# Patient Record
Sex: MALE | Race: WHITE | NOT HISPANIC OR LATINO | Employment: FULL TIME | URBAN - METROPOLITAN AREA
[De-identification: names, ages, dates, MRNs, and addresses within clinical notes are randomized per-mention and may not be internally consistent; named-entity substitution may affect disease eponyms.]

---

## 2017-07-03 PROBLEM — I20.9 ANGINA, CLASS III: Status: ACTIVE | Noted: 2017-07-03

## 2017-07-03 PROBLEM — R07.9 CHEST PAIN SYNDROME: Status: ACTIVE | Noted: 2017-07-03

## 2018-05-31 ENCOUNTER — OFFICE VISIT (OUTPATIENT)
Dept: INTERNAL MEDICINE | Facility: CLINIC | Age: 38
End: 2018-05-31
Payer: COMMERCIAL

## 2018-05-31 VITALS
OXYGEN SATURATION: 97 % | HEART RATE: 66 BPM | DIASTOLIC BLOOD PRESSURE: 76 MMHG | SYSTOLIC BLOOD PRESSURE: 108 MMHG | WEIGHT: 268.63 LBS | HEIGHT: 74 IN | BODY MASS INDEX: 34.48 KG/M2

## 2018-05-31 DIAGNOSIS — I20.9 ANGINA, CLASS III: ICD-10-CM

## 2018-05-31 DIAGNOSIS — A60.00 GENITAL HERPES SIMPLEX, UNSPECIFIED SITE: ICD-10-CM

## 2018-05-31 DIAGNOSIS — Z13.220 SCREENING CHOLESTEROL LEVEL: ICD-10-CM

## 2018-05-31 DIAGNOSIS — Z13.29 SCREENING FOR THYROID DISORDER: ICD-10-CM

## 2018-05-31 DIAGNOSIS — Z01.89 ROUTINE LAB DRAW: ICD-10-CM

## 2018-05-31 DIAGNOSIS — Z00.00 ENCOUNTER FOR HEALTH MAINTENANCE EXAMINATION IN ADULT: Primary | ICD-10-CM

## 2018-05-31 PROCEDURE — 99999 PR PBB SHADOW E&M-NEW PATIENT-LVL IV: CPT | Mod: PBBFAC,,, | Performed by: NURSE PRACTITIONER

## 2018-05-31 PROCEDURE — 99385 PREV VISIT NEW AGE 18-39: CPT | Mod: S$GLB,,, | Performed by: NURSE PRACTITIONER

## 2018-05-31 RX ORDER — VALACYCLOVIR HYDROCHLORIDE 500 MG/1
500 TABLET, FILM COATED ORAL 2 TIMES DAILY
Qty: 180 TABLET | Refills: 3 | Status: SHIPPED | OUTPATIENT
Start: 2018-05-31 | End: 2019-06-19 | Stop reason: SDUPTHER

## 2018-05-31 NOTE — PATIENT INSTRUCTIONS
1.) Refill meds  2.) Lab appointment on another day, will call with results, if everything is ok, RTC in 1 yr or sooner as needed.

## 2018-05-31 NOTE — PROGRESS NOTES
"Subjective:       Patient ID: Danilo Zambrano is a 38 y.o. male.    Chief Complaint: Medication Refill    Pt is here for annual and medication refill. No established PCP, new to us. Needs refill on Valtrex for chronic genital herpes. No complaints, has his first child on the way.    Last seen in [place] for suspected angina, chest pain. States, "my wife overacted do they did a cardiac cath", everything was normal per pt. Has not had any issues of chest pain since.      Medication Refill   This is a chronic (refill on valtrex for chronic genital herpes) problem. The current episode started more than 1 year ago. The problem occurs intermittently. The problem has been waxing and waning. Pertinent negatives include no abdominal pain, arthralgias, chest pain, chills, coughing, fatigue, fever, joint swelling, myalgias, nausea, numbness, rash, sore throat, swollen glands, urinary symptoms, visual change, vomiting or weakness. The symptoms are aggravated by stress. Treatments tried: valtrex 500mg BID. The treatment provided significant relief.     Review of Systems   Constitutional: Negative for activity change, appetite change, chills, fatigue, fever and unexpected weight change.   HENT: Negative for hearing loss, sore throat and voice change.    Eyes: Negative for visual disturbance.   Respiratory: Negative for apnea, cough, chest tightness and shortness of breath.    Cardiovascular: Negative for chest pain, palpitations and leg swelling.   Gastrointestinal: Negative for abdominal distention, abdominal pain, blood in stool, constipation, diarrhea, nausea and vomiting.   Endocrine: Negative for cold intolerance, heat intolerance, polydipsia, polyphagia and polyuria.   Genitourinary: Negative for difficulty urinating, discharge, dysuria, flank pain, genital sores, hematuria, penile pain, penile swelling, scrotal swelling, testicular pain and urgency.   Musculoskeletal: Negative for arthralgias, joint swelling and " "myalgias.   Skin: Negative for color change, pallor, rash and wound.   Allergic/Immunologic: Negative for environmental allergies, food allergies and immunocompromised state.   Neurological: Negative for dizziness, weakness and numbness.   Hematological: Negative for adenopathy. Does not bruise/bleed easily.   Psychiatric/Behavioral: Negative for agitation, behavioral problems, sleep disturbance and suicidal ideas.       Review of patient's allergies indicates:  No Known Allergies    Current Outpatient Prescriptions:     valACYclovir (VALTREX) 500 MG tablet, Take 1 tablet (500 mg total) by mouth 2 (two) times daily., Disp: 180 tablet, Rfl: 3    Patient Active Problem List   Diagnosis    Chest pain syndrome    Angina, class III    Genital herpes     Past Medical History:   Diagnosis Date    Genital herpes     Stable angina 07/03/2017     Past Surgical History:   Procedure Laterality Date    cardiac cath  07/03/2017    stable angina, Dr. Dobbins    NASAL SEPTUM SURGERY       Social History     Social History    Marital status:      Spouse name: N/A    Number of children: N/A    Years of education: N/A     Social History Main Topics    Smoking status: Never Smoker    Smokeless tobacco: Never Used    Alcohol use No    Drug use: No    Sexual activity: Yes     Other Topics Concern    None     Social History Narrative    None   \  Family History   Problem Relation Age of Onset    Heart disease Father        Objective:       Vitals:    05/31/18 0831   BP: 108/76   Pulse: 66   SpO2: 97%   Weight: 121.8 kg (268 lb 9.6 oz)   Height: 6' 2" (1.88 m)   PainSc: 0-No pain     Body mass index is 34.49 kg/m².    Physical Exam   Constitutional: He is oriented to person, place, and time. He appears well-developed and well-nourished.   obese   HENT:   Head: Normocephalic.   Eyes: Conjunctivae, EOM and lids are normal. Pupils are equal, round, and reactive to light. Lids are everted and swept, no foreign bodies " found.   Neck: Trachea normal, normal range of motion and full passive range of motion without pain. Neck supple. No JVD present. Carotid bruit is not present.   Cardiovascular: Normal rate, regular rhythm, normal heart sounds, intact distal pulses and normal pulses.    Pulmonary/Chest: Effort normal and breath sounds normal.   Abdominal: Soft. Normal appearance and bowel sounds are normal. There is no hepatosplenomegaly. There is no CVA tenderness.   Musculoskeletal: Normal range of motion.   Neurological: He is alert and oriented to person, place, and time.   Skin: Skin is warm, dry and intact. Capillary refill takes less than 2 seconds.   Psychiatric: He has a normal mood and affect. His speech is normal and behavior is normal. Judgment and thought content normal. Cognition and memory are normal.   Vitals reviewed.      Assessment:       1. Encounter for health maintenance examination in adult  CBC auto differential    Comprehensive metabolic panel    Lipid panel    TSH    Urinalysis   2. Screening cholesterol level  Lipid panel   3. Screening for thyroid disorder  TSH   4. Routine lab draw  CBC auto differential    Comprehensive metabolic panel    Lipid panel    TSH    Urinalysis   5. Angina, class III  CBC auto differential    Comprehensive metabolic panel    Lipid panel    TSH    Urinalysis   6. Genital herpes simplex, unspecified site  valACYclovir (VALTREX) 500 MG tablet   7. BMI 34.0-34.9,adult             Plan:     Danilo Lockett was seen today for medication refill.    Diagnoses and all orders for this visit:    Encounter for health maintenance examination in adult  -     CBC auto differential; Future  -     Comprehensive metabolic panel; Future  -     Lipid panel; Future  -     TSH; Future  -     Urinalysis; Future    Screening cholesterol level  -     Lipid panel; Future    Screening for thyroid disorder  -     TSH; Future    Routine lab draw  -     CBC auto differential; Future  -     Comprehensive  metabolic panel; Future  -     Lipid panel; Future  -     TSH; Future  -     Urinalysis; Future    Angina, class III  -     CBC auto differential; Future  -     Comprehensive metabolic panel; Future  -     Lipid panel; Future  -     TSH; Future  -     Urinalysis; Future    Genital herpes simplex, unspecified site  -     valACYclovir (VALTREX) 500 MG tablet; Take 1 tablet (500 mg total) by mouth 2 (two) times daily.    BMI 34.0-34.9,adult    1.) Exam done  2.) Fasting labs ordered, pt will make appt for labs on a day he is fasting at checkout Will call with results, if labs ok, RTC in 1 yr for annual or sooner prn  3.) Angina problem per patient is resolved, has not had issues, cardiac cath last year was normal  4.) Refill vlatrex  5.) BMI reviewed. Diet and exercise to lose weight.

## 2019-01-05 ENCOUNTER — OFFICE VISIT (OUTPATIENT)
Dept: INTERNAL MEDICINE | Facility: CLINIC | Age: 39
End: 2019-01-05
Payer: COMMERCIAL

## 2019-01-05 VITALS
OXYGEN SATURATION: 96 % | HEIGHT: 74 IN | WEIGHT: 270.5 LBS | BODY MASS INDEX: 34.72 KG/M2 | TEMPERATURE: 98 F | SYSTOLIC BLOOD PRESSURE: 120 MMHG | HEART RATE: 85 BPM | DIASTOLIC BLOOD PRESSURE: 76 MMHG

## 2019-01-05 DIAGNOSIS — J06.9 VIRAL URI: Primary | ICD-10-CM

## 2019-01-05 PROCEDURE — 99213 OFFICE O/P EST LOW 20 MIN: CPT | Mod: S$GLB,,, | Performed by: INTERNAL MEDICINE

## 2019-01-05 PROCEDURE — 99999 PR PBB SHADOW E&M-EST. PATIENT-LVL III: CPT | Mod: PBBFAC,,, | Performed by: INTERNAL MEDICINE

## 2019-01-05 PROCEDURE — 99999 PR PBB SHADOW E&M-EST. PATIENT-LVL III: ICD-10-PCS | Mod: PBBFAC,,, | Performed by: INTERNAL MEDICINE

## 2019-01-05 PROCEDURE — 99213 PR OFFICE/OUTPT VISIT, EST, LEVL III, 20-29 MIN: ICD-10-PCS | Mod: S$GLB,,, | Performed by: INTERNAL MEDICINE

## 2019-01-05 PROCEDURE — 3008F BODY MASS INDEX DOCD: CPT | Mod: CPTII,S$GLB,, | Performed by: INTERNAL MEDICINE

## 2019-01-05 PROCEDURE — 3008F PR BODY MASS INDEX (BMI) DOCUMENTED: ICD-10-PCS | Mod: CPTII,S$GLB,, | Performed by: INTERNAL MEDICINE

## 2019-01-05 NOTE — PROGRESS NOTES
"Subjective:       Patient ID: Danilo Zambrano is a 38 y.o. male.    Chief Complaint: URI    Body aches, nasal and chest congestion and sore throat for 2 days.  Has 3 month old child at home and he is trying not to infect his wife or child.  No fever or chills, but did have a "big sweat" last night.  Did get a flu shot      Review of Systems   Constitutional: Negative for activity change, appetite change and fever.   HENT: Negative for congestion, postnasal drip and sore throat.    Respiratory: Negative for cough, shortness of breath and wheezing.    Cardiovascular: Negative for chest pain and palpitations.   Gastrointestinal: Negative for abdominal pain, blood in stool, constipation, diarrhea, nausea and vomiting.   Genitourinary: Negative for decreased urine volume, difficulty urinating, flank pain and frequency.   Musculoskeletal: Negative for arthralgias.   Neurological: Negative for dizziness, weakness and headaches.       Objective:      Physical Exam   Constitutional: He is oriented to person, place, and time. He appears well-developed and well-nourished. No distress.   HENT:   Head: Normocephalic and atraumatic.   Right Ear: External ear normal.   Left Ear: External ear normal.   Eyes: Conjunctivae and EOM are normal. Pupils are equal, round, and reactive to light.   Neck: Normal range of motion. Neck supple. No thyromegaly present.   Cardiovascular: Normal rate and regular rhythm.   Pulmonary/Chest: Effort normal and breath sounds normal.   Abdominal: Soft. Bowel sounds are normal. He exhibits no mass. There is no tenderness. There is no rebound and no guarding.   Musculoskeletal: Normal range of motion.   Lymphadenopathy:     He has no cervical adenopathy.   Neurological: He is alert and oriented to person, place, and time. He has normal reflexes. He displays normal reflexes. No cranial nerve deficit. He exhibits normal muscle tone. Coordination normal.   Skin: Skin is warm and dry.       Assessment: "       1. Viral URI        Plan:   Danilo Lockett was seen today for uri.    Diagnoses and all orders for this visit:    Viral URI

## 2019-01-09 ENCOUNTER — TELEPHONE (OUTPATIENT)
Dept: INTERNAL MEDICINE | Facility: CLINIC | Age: 39
End: 2019-01-09

## 2019-01-09 RX ORDER — LEVOFLOXACIN 500 MG/1
500 TABLET, FILM COATED ORAL DAILY
Qty: 10 TABLET | Refills: 0 | Status: SHIPPED | OUTPATIENT
Start: 2019-01-09 | End: 2019-01-19

## 2019-01-09 RX ORDER — PROMETHAZINE HYDROCHLORIDE AND CODEINE PHOSPHATE 6.25; 1 MG/5ML; MG/5ML
5 SOLUTION ORAL NIGHTLY PRN
Qty: 120 ML | Refills: 0 | Status: SHIPPED | OUTPATIENT
Start: 2019-01-09 | End: 2019-01-19

## 2019-01-09 NOTE — TELEPHONE ENCOUNTER
Spoke to pt states since Saturday URI symptoms have worsen pt is still taking zyrtec otc and mucinex with no improvement pt has not had fever , but has noticed yellow color in mucus cant sleep at night due to cough would like further advice if anything else can be prescribed for cough or if pt needs to come in again to be seen please advise thank you

## 2019-01-09 NOTE — TELEPHONE ENCOUNTER
----- Message from Shanthi Akins sent at 1/9/2019 10:44 AM CST -----  Contact: [employer] request  Message     ----- Message from [facility], System Message sent at 1/6/2019  8:02 PM CST -----    Appointment Request From: Danilo Zambrano    With Provider: Hyacinth Francisco MD [[facility] - Internal Medicine]    Preferred Date Range: 1/7/2019 - 1/7/2019    Preferred Times: Monday Morning, Monday Afternoon    Reason for visit: Sickness Worsen    Comments:  I came in on Saturday with small to light symptoms and they gradually worsen. I have yellow mucus now.

## 2019-06-19 ENCOUNTER — LAB VISIT (OUTPATIENT)
Dept: LAB | Facility: HOSPITAL | Age: 39
End: 2019-06-19
Payer: COMMERCIAL

## 2019-06-19 ENCOUNTER — OFFICE VISIT (OUTPATIENT)
Dept: INTERNAL MEDICINE | Facility: CLINIC | Age: 39
End: 2019-06-19
Payer: COMMERCIAL

## 2019-06-19 VITALS
WEIGHT: 265.63 LBS | OXYGEN SATURATION: 97 % | BODY MASS INDEX: 35.21 KG/M2 | DIASTOLIC BLOOD PRESSURE: 84 MMHG | HEART RATE: 92 BPM | HEIGHT: 73 IN | SYSTOLIC BLOOD PRESSURE: 130 MMHG

## 2019-06-19 DIAGNOSIS — R53.83 FATIGUE, UNSPECIFIED TYPE: Primary | ICD-10-CM

## 2019-06-19 DIAGNOSIS — K12.1 ORAL ULCER: ICD-10-CM

## 2019-06-19 DIAGNOSIS — R53.83 FATIGUE, UNSPECIFIED TYPE: ICD-10-CM

## 2019-06-19 DIAGNOSIS — R51.9 NONINTRACTABLE EPISODIC HEADACHE, UNSPECIFIED HEADACHE TYPE: ICD-10-CM

## 2019-06-19 DIAGNOSIS — R25.2 LEG CRAMPS: ICD-10-CM

## 2019-06-19 DIAGNOSIS — Z86.19 HISTORY OF HERPES GENITALIS: ICD-10-CM

## 2019-06-19 PROBLEM — R07.9 CHEST PAIN SYNDROME: Status: RESOLVED | Noted: 2017-07-03 | Resolved: 2019-06-19

## 2019-06-19 PROBLEM — I20.9 ANGINA, CLASS III: Status: RESOLVED | Noted: 2017-07-03 | Resolved: 2019-06-19

## 2019-06-19 LAB
25(OH)D3+25(OH)D2 SERPL-MCNC: 11 NG/ML (ref 30–96)
ALBUMIN SERPL BCP-MCNC: 4.5 G/DL (ref 3.5–5.2)
ALP SERPL-CCNC: 81 U/L (ref 55–135)
ALT SERPL W/O P-5'-P-CCNC: 97 U/L (ref 10–44)
ANION GAP SERPL CALC-SCNC: 13 MMOL/L (ref 8–16)
AST SERPL-CCNC: 58 U/L (ref 10–40)
BASOPHILS # BLD AUTO: 0.06 K/UL (ref 0–0.2)
BASOPHILS NFR BLD: 1.1 % (ref 0–1.9)
BILIRUB SERPL-MCNC: 1.3 MG/DL (ref 0.1–1)
BUN SERPL-MCNC: 14 MG/DL (ref 6–20)
CALCIUM SERPL-MCNC: 9.7 MG/DL (ref 8.7–10.5)
CHLORIDE SERPL-SCNC: 102 MMOL/L (ref 95–110)
CO2 SERPL-SCNC: 26 MMOL/L (ref 23–29)
CREAT SERPL-MCNC: 1.2 MG/DL (ref 0.5–1.4)
DIFFERENTIAL METHOD: ABNORMAL
EOSINOPHIL # BLD AUTO: 0.1 K/UL (ref 0–0.5)
EOSINOPHIL NFR BLD: 1.7 % (ref 0–8)
ERYTHROCYTE [DISTWIDTH] IN BLOOD BY AUTOMATED COUNT: 13.1 % (ref 11.5–14.5)
EST. GFR  (AFRICAN AMERICAN): >60 ML/MIN/1.73 M^2
EST. GFR  (NON AFRICAN AMERICAN): >60 ML/MIN/1.73 M^2
ESTIMATED AVG GLUCOSE: 103 MG/DL (ref 68–131)
GLUCOSE SERPL-MCNC: 92 MG/DL (ref 70–110)
HBA1C MFR BLD HPLC: 5.2 % (ref 4–5.6)
HCT VFR BLD AUTO: 43.2 % (ref 40–54)
HGB BLD-MCNC: 14.7 G/DL (ref 14–18)
LYMPHOCYTES # BLD AUTO: 2.1 K/UL (ref 1–4.8)
LYMPHOCYTES NFR BLD: 38.8 % (ref 18–48)
MCH RBC QN AUTO: 29.9 PG (ref 27–31)
MCHC RBC AUTO-ENTMCNC: 34 G/DL (ref 32–36)
MCV RBC AUTO: 88 FL (ref 82–98)
MONOCYTES # BLD AUTO: 0.8 K/UL (ref 0.3–1)
MONOCYTES NFR BLD: 15.1 % (ref 4–15)
NEUTROPHILS # BLD AUTO: 2.3 K/UL (ref 1.8–7.7)
NEUTROPHILS NFR BLD: 42.9 % (ref 38–73)
PLATELET # BLD AUTO: 147 K/UL (ref 150–350)
PMV BLD AUTO: 9.8 FL (ref 9.2–12.9)
POTASSIUM SERPL-SCNC: 4 MMOL/L (ref 3.5–5.1)
PROT SERPL-MCNC: 7.7 G/DL (ref 6–8.4)
RBC # BLD AUTO: 4.92 M/UL (ref 4.6–6.2)
SODIUM SERPL-SCNC: 141 MMOL/L (ref 136–145)
TSH SERPL DL<=0.005 MIU/L-ACNC: 0.92 UIU/ML (ref 0.4–4)
VIT B12 SERPL-MCNC: 427 PG/ML (ref 210–950)
WBC # BLD AUTO: 5.31 K/UL (ref 3.9–12.7)

## 2019-06-19 PROCEDURE — 99999 PR PBB SHADOW E&M-EST. PATIENT-LVL III: ICD-10-PCS | Mod: PBBFAC,,, | Performed by: PHYSICIAN ASSISTANT

## 2019-06-19 PROCEDURE — 36415 COLL VENOUS BLD VENIPUNCTURE: CPT

## 2019-06-19 PROCEDURE — 99999 PR PBB SHADOW E&M-EST. PATIENT-LVL III: CPT | Mod: PBBFAC,,, | Performed by: PHYSICIAN ASSISTANT

## 2019-06-19 PROCEDURE — 3008F PR BODY MASS INDEX (BMI) DOCUMENTED: ICD-10-PCS | Mod: CPTII,S$GLB,, | Performed by: PHYSICIAN ASSISTANT

## 2019-06-19 PROCEDURE — 80053 COMPREHEN METABOLIC PANEL: CPT

## 2019-06-19 PROCEDURE — 85025 COMPLETE CBC W/AUTO DIFF WBC: CPT

## 2019-06-19 PROCEDURE — 99214 PR OFFICE/OUTPT VISIT, EST, LEVL IV, 30-39 MIN: ICD-10-PCS | Mod: S$GLB,,, | Performed by: PHYSICIAN ASSISTANT

## 2019-06-19 PROCEDURE — 82607 VITAMIN B-12: CPT

## 2019-06-19 PROCEDURE — 99214 OFFICE O/P EST MOD 30 MIN: CPT | Mod: S$GLB,,, | Performed by: PHYSICIAN ASSISTANT

## 2019-06-19 PROCEDURE — 83036 HEMOGLOBIN GLYCOSYLATED A1C: CPT

## 2019-06-19 PROCEDURE — 84443 ASSAY THYROID STIM HORMONE: CPT

## 2019-06-19 PROCEDURE — 3008F BODY MASS INDEX DOCD: CPT | Mod: CPTII,S$GLB,, | Performed by: PHYSICIAN ASSISTANT

## 2019-06-19 PROCEDURE — 82306 VITAMIN D 25 HYDROXY: CPT

## 2019-06-19 RX ORDER — VALACYCLOVIR HYDROCHLORIDE 500 MG/1
500 TABLET, FILM COATED ORAL 2 TIMES DAILY
Qty: 30 TABLET | Refills: 3 | Status: SHIPPED | OUTPATIENT
Start: 2019-06-19 | End: 2019-08-21 | Stop reason: SDUPTHER

## 2019-06-19 NOTE — PATIENT INSTRUCTIONS
START YOUR VALTREX. TAKE 500MG TWICE A DAY FOR THE NEXT 3 DAYS. REPEAT AS NEEDED FOR OUTBREAKS.     I WILL SEND YOU A MESSAGE VIA THE PORTAL ABOUT YOUR LAB RESULTS.

## 2019-06-20 ENCOUNTER — PATIENT MESSAGE (OUTPATIENT)
Dept: INTERNAL MEDICINE | Facility: CLINIC | Age: 39
End: 2019-06-20

## 2019-06-20 DIAGNOSIS — R74.8 ELEVATED LIVER ENZYMES: ICD-10-CM

## 2019-06-20 DIAGNOSIS — E55.9 VITAMIN D DEFICIENCY: Primary | ICD-10-CM

## 2019-06-20 NOTE — PROGRESS NOTES
"Subjective:       Patient ID: Danilo Zambrano is a 39 y.o. male.    Chief Complaint: Headache; Mouth Lesions (Left side); and Cramping (leg)    HPI     Established pt of Zheng Dobbins MD, MD (Inactive) (new to me)    Pt with few complaints today. Varying symptoms for the past week or so after returning home from a Business trip to [place]. C/o episodic headache (frontal pressure, resolved with tylenol), no HA today). C/o Oral ulcer, recurrent in nature. He has hx of herpes. He is out of valtrex. C/o leg cramps that resolved after massaging. C/o generalized fatigue.     He inquires if he symptoms maybe related to acute onset of DM. He performed a finger stick at home with reported value of 83.        No fevers, cough, cp, sob or sore throat. Sleeping well. Appetite good.     Review of patient's allergies indicates:  No Known Allergies    Past Medical History:   Diagnosis Date    Genital herpes     Stable angina 07/03/2017     Social History     Tobacco Use    Smoking status: Never Smoker    Smokeless tobacco: Never Used   Substance Use Topics    Alcohol use: No    Drug use: No     Patient Active Problem List   Diagnosis    Genital herpes       Review of Systems   Constitutional: Positive for fatigue. Negative for chills, diaphoresis, fever and unexpected weight change.   HENT: Positive for mouth sores.    Respiratory: Negative for cough and shortness of breath.    Cardiovascular: Negative for chest pain and leg swelling.   Gastrointestinal: Negative for abdominal pain, nausea and vomiting.   Endocrine: Negative for polydipsia, polyphagia and polyuria.   Musculoskeletal: Positive for myalgias.   Skin: Negative for rash.   Neurological: Positive for headaches. Negative for weakness and light-headedness.       Objective: /84   Pulse 92   Ht 6' 1" (1.854 m)   Wt 120.5 kg (265 lb 10.5 oz)   SpO2 97%   BMI 35.05 kg/m²         Physical Exam   Constitutional: He appears well-developed and well-nourished. No " distress.   HENT:   Head: Normocephalic and atraumatic.   Mouth/Throat: Oropharynx is clear and moist. No posterior oropharyngeal erythema.   Ulcer to mucosa of left upper lip   Cardiovascular: Normal rate and regular rhythm. Exam reveals no friction rub.   No murmur heard.  Pulmonary/Chest: Effort normal and breath sounds normal. He has no wheezes. He has no rales.   Abdominal: Soft. Bowel sounds are normal. There is no tenderness.   Musculoskeletal: He exhibits no edema.        Right lower leg: He exhibits no tenderness, no swelling and no edema.        Left lower leg: He exhibits no tenderness, no swelling and no edema.   Lymphadenopathy:     He has no cervical adenopathy.   Neurological: He is alert.   Skin: Skin is warm and dry. No rash noted.   Vitals reviewed.      Assessment:       1. Fatigue, unspecified type    2. Leg cramps    3. Nonintractable episodic headache, unspecified headache type    4. Oral ulcer    5. History of herpes genitalis        Plan:           Danilo Lockett was seen today for headache, mouth lesions and cramping.    Diagnoses and all orders for this visit:    Fatigue, unspecified type  -     Comprehensive metabolic panel; Future  -     CBC auto differential; Future  -     TSH; Future  -     Hemoglobin A1c; Future  -     Vitamin D; Future  -     Vitamin B12; Future    Leg cramps  -     Comprehensive metabolic panel; Future  -     CBC auto differential; Future    Nonintractable episodic headache, unspecified headache type  -     Comprehensive metabolic panel; Future  -     CBC auto differential; Future    Oral ulcer  -     valACYclovir (VALTREX) 500 MG tablet; Take 1 tablet (500 mg total) by mouth 2 (two) times daily.  -     diphenhydrAMINE-aluminum-magnesium hydroxide-simethicone-lidocaine HCl 2%; Swish and spit 15 mLs every 4 (four) hours as needed.    History of herpes genitalis  -     valACYclovir (VALTREX) 500 MG tablet; Take 1 tablet (500 mg total) by mouth 2 (two) times  daily.          Marina Fritz PA-C

## 2019-06-21 RX ORDER — ERGOCALCIFEROL 1.25 MG/1
50000 CAPSULE ORAL
Qty: 8 CAPSULE | Refills: 0 | Status: SHIPPED | OUTPATIENT
Start: 2019-06-21 | End: 2020-07-01

## 2019-06-21 NOTE — TELEPHONE ENCOUNTER
Called patient to discuss lab.   Pt unavailable at this time.   Will send [facility].

## 2019-06-22 ENCOUNTER — NURSE TRIAGE (OUTPATIENT)
Dept: ADMINISTRATIVE | Facility: CLINIC | Age: 39
End: 2019-06-22

## 2019-06-24 ENCOUNTER — PATIENT MESSAGE (OUTPATIENT)
Dept: INTERNAL MEDICINE | Facility: CLINIC | Age: 39
End: 2019-06-24

## 2019-06-25 ENCOUNTER — HOSPITAL ENCOUNTER (OUTPATIENT)
Dept: RADIOLOGY | Facility: HOSPITAL | Age: 39
Discharge: HOME | End: 2019-06-25
Attending: PHYSICIAN ASSISTANT
Payer: COMMERCIAL

## 2019-06-25 ENCOUNTER — PATIENT MESSAGE (OUTPATIENT)
Dept: INTERNAL MEDICINE | Facility: CLINIC | Age: 39
End: 2019-06-25

## 2019-06-25 DIAGNOSIS — R74.8 ELEVATED LIVER ENZYMES: ICD-10-CM

## 2019-06-25 PROCEDURE — 76705 ECHO EXAM OF ABDOMEN: CPT | Mod: TC

## 2019-06-25 PROCEDURE — 76705 ECHO EXAM OF ABDOMEN: CPT | Mod: 26,,, | Performed by: RADIOLOGY

## 2019-06-25 PROCEDURE — 76705 US ABDOMEN LIMITED_LIVER: ICD-10-PCS | Mod: 26,,, | Performed by: RADIOLOGY

## 2019-06-26 ENCOUNTER — TELEPHONE (OUTPATIENT)
Dept: INTERNAL MEDICINE | Facility: CLINIC | Age: 39
End: 2019-06-26

## 2019-06-26 DIAGNOSIS — R74.8 ELEVATED LIVER ENZYMES: ICD-10-CM

## 2019-06-26 DIAGNOSIS — K76.0 HEPATIC STEATOSIS: ICD-10-CM

## 2019-06-26 DIAGNOSIS — R16.2 HEPATOSPLENOMEGALY: Primary | ICD-10-CM

## 2019-06-26 NOTE — TELEPHONE ENCOUNTER
Called and reviewed lab and liver US.   Will refer to Hepatology for further eval.   Pt voiced understanding.

## 2019-06-27 ENCOUNTER — PATIENT MESSAGE (OUTPATIENT)
Dept: HEPATOLOGY | Facility: CLINIC | Age: 39
End: 2019-06-27

## 2019-06-29 ENCOUNTER — PATIENT MESSAGE (OUTPATIENT)
Dept: INTERNAL MEDICINE | Facility: CLINIC | Age: 39
End: 2019-06-29

## 2019-07-01 ENCOUNTER — PATIENT MESSAGE (OUTPATIENT)
Dept: HEPATOLOGY | Facility: CLINIC | Age: 39
End: 2019-07-01

## 2019-07-02 ENCOUNTER — OFFICE VISIT (OUTPATIENT)
Dept: INTERNAL MEDICINE | Facility: CLINIC | Age: 39
End: 2019-07-02
Payer: COMMERCIAL

## 2019-07-02 ENCOUNTER — TELEPHONE (OUTPATIENT)
Dept: INTERNAL MEDICINE | Facility: CLINIC | Age: 39
End: 2019-07-02

## 2019-07-02 ENCOUNTER — LAB VISIT (OUTPATIENT)
Dept: LAB | Facility: HOSPITAL | Age: 39
End: 2019-07-02
Attending: PHYSICIAN ASSISTANT
Payer: COMMERCIAL

## 2019-07-02 ENCOUNTER — OFFICE VISIT (OUTPATIENT)
Dept: HEPATOLOGY | Facility: CLINIC | Age: 39
End: 2019-07-02
Payer: COMMERCIAL

## 2019-07-02 VITALS
BODY MASS INDEX: 33.95 KG/M2 | WEIGHT: 264.56 LBS | HEART RATE: 99 BPM | SYSTOLIC BLOOD PRESSURE: 115 MMHG | HEIGHT: 74 IN | DIASTOLIC BLOOD PRESSURE: 75 MMHG | OXYGEN SATURATION: 96 % | RESPIRATION RATE: 18 BRPM

## 2019-07-02 VITALS
OXYGEN SATURATION: 96 % | HEART RATE: 91 BPM | SYSTOLIC BLOOD PRESSURE: 108 MMHG | BODY MASS INDEX: 34.61 KG/M2 | WEIGHT: 262.38 LBS | DIASTOLIC BLOOD PRESSURE: 80 MMHG

## 2019-07-02 DIAGNOSIS — R82.998 DARK URINE: ICD-10-CM

## 2019-07-02 DIAGNOSIS — R53.83 FATIGUE, UNSPECIFIED TYPE: ICD-10-CM

## 2019-07-02 DIAGNOSIS — R74.8 ELEVATED LIVER ENZYMES: Primary | ICD-10-CM

## 2019-07-02 DIAGNOSIS — R74.8 ELEVATED LIVER ENZYMES: ICD-10-CM

## 2019-07-02 DIAGNOSIS — M79.10 MYALGIA: ICD-10-CM

## 2019-07-02 DIAGNOSIS — R68.82 DECREASED SEX DRIVE: ICD-10-CM

## 2019-07-02 DIAGNOSIS — R53.83 FATIGUE, UNSPECIFIED TYPE: Primary | ICD-10-CM

## 2019-07-02 DIAGNOSIS — R51.9 NONINTRACTABLE EPISODIC HEADACHE, UNSPECIFIED HEADACHE TYPE: ICD-10-CM

## 2019-07-02 DIAGNOSIS — R45.89 ANXIETY ABOUT HEALTH: ICD-10-CM

## 2019-07-02 DIAGNOSIS — R16.2 HEPATOSPLENOMEGALY: ICD-10-CM

## 2019-07-02 DIAGNOSIS — K76.89 HEPATIC CYST: ICD-10-CM

## 2019-07-02 LAB
BACTERIA #/AREA URNS AUTO: ABNORMAL /HPF
BILIRUB UR QL STRIP: NEGATIVE
CK SERPL-CCNC: 60 U/L (ref 20–200)
CLARITY UR REFRACT.AUTO: CLEAR
COLOR UR AUTO: ABNORMAL
CRP SERPL-MCNC: 58.8 MG/L (ref 0–8.2)
ERYTHROCYTE [SEDIMENTATION RATE] IN BLOOD BY WESTERGREN METHOD: 4 MM/HR (ref 0–23)
GLUCOSE UR QL STRIP: NEGATIVE
HGB UR QL STRIP: NEGATIVE
HYALINE CASTS UR QL AUTO: 0 /LPF
KETONES UR QL STRIP: NEGATIVE
LEUKOCYTE ESTERASE UR QL STRIP: NEGATIVE
MICROSCOPIC COMMENT: ABNORMAL
NITRITE UR QL STRIP: NEGATIVE
PH UR STRIP: 5 [PH] (ref 5–8)
PROT UR QL STRIP: ABNORMAL
RBC #/AREA URNS AUTO: 0 /HPF (ref 0–4)
SP GR UR STRIP: 1.02 (ref 1–1.03)
SQUAMOUS #/AREA URNS AUTO: 1 /HPF
TESTOST SERPL-MCNC: 235 NG/DL (ref 304–1227)
URN SPEC COLLECT METH UR: ABNORMAL
WBC #/AREA URNS AUTO: 1 /HPF (ref 0–5)

## 2019-07-02 PROCEDURE — 99214 PR OFFICE/OUTPT VISIT, EST, LEVL IV, 30-39 MIN: ICD-10-PCS | Mod: S$GLB,,, | Performed by: PHYSICIAN ASSISTANT

## 2019-07-02 PROCEDURE — 81001 URINALYSIS AUTO W/SCOPE: CPT

## 2019-07-02 PROCEDURE — 85652 RBC SED RATE AUTOMATED: CPT

## 2019-07-02 PROCEDURE — 86140 C-REACTIVE PROTEIN: CPT

## 2019-07-02 PROCEDURE — 3008F BODY MASS INDEX DOCD: CPT | Mod: CPTII,S$GLB,, | Performed by: INTERNAL MEDICINE

## 2019-07-02 PROCEDURE — 3008F PR BODY MASS INDEX (BMI) DOCUMENTED: ICD-10-PCS | Mod: CPTII,S$GLB,, | Performed by: INTERNAL MEDICINE

## 2019-07-02 PROCEDURE — 3008F PR BODY MASS INDEX (BMI) DOCUMENTED: ICD-10-PCS | Mod: CPTII,S$GLB,, | Performed by: PHYSICIAN ASSISTANT

## 2019-07-02 PROCEDURE — 36415 COLL VENOUS BLD VENIPUNCTURE: CPT

## 2019-07-02 PROCEDURE — 99999 PR PBB SHADOW E&M-EST. PATIENT-LVL III: ICD-10-PCS | Mod: PBBFAC,,, | Performed by: PHYSICIAN ASSISTANT

## 2019-07-02 PROCEDURE — 3008F BODY MASS INDEX DOCD: CPT | Mod: CPTII,S$GLB,, | Performed by: PHYSICIAN ASSISTANT

## 2019-07-02 PROCEDURE — 99999 PR PBB SHADOW E&M-EST. PATIENT-LVL III: CPT | Mod: PBBFAC,,, | Performed by: PHYSICIAN ASSISTANT

## 2019-07-02 PROCEDURE — 99999 PR PBB SHADOW E&M-EST. PATIENT-LVL IV: CPT | Mod: PBBFAC,,, | Performed by: INTERNAL MEDICINE

## 2019-07-02 PROCEDURE — 99214 OFFICE O/P EST MOD 30 MIN: CPT | Mod: S$GLB,,, | Performed by: PHYSICIAN ASSISTANT

## 2019-07-02 PROCEDURE — 99204 OFFICE O/P NEW MOD 45 MIN: CPT | Mod: S$GLB,,, | Performed by: INTERNAL MEDICINE

## 2019-07-02 PROCEDURE — 99204 PR OFFICE/OUTPT VISIT, NEW, LEVL IV, 45-59 MIN: ICD-10-PCS | Mod: S$GLB,,, | Performed by: INTERNAL MEDICINE

## 2019-07-02 PROCEDURE — 99999 PR PBB SHADOW E&M-EST. PATIENT-LVL IV: ICD-10-PCS | Mod: PBBFAC,,, | Performed by: INTERNAL MEDICINE

## 2019-07-02 PROCEDURE — 82550 ASSAY OF CK (CPK): CPT

## 2019-07-02 PROCEDURE — 84403 ASSAY OF TOTAL TESTOSTERONE: CPT

## 2019-07-02 PROCEDURE — 86703 HIV-1/HIV-2 1 RESULT ANTBDY: CPT

## 2019-07-02 NOTE — PROGRESS NOTES
Answers for HPI/ROS submitted by the patient on 6/29/2019   activity change: Yes  unexpected weight change: No  neck pain: No  hearing loss: No  rhinorrhea: No  trouble swallowing: No  eye discharge: No  visual disturbance: No  chest tightness: No  wheezing: No  chest pain: No  palpitations: No  blood in stool: No  constipation: Yes  vomiting: No  diarrhea: No  polydipsia: No  polyuria: No  difficulty urinating: Yes  urgency: No  hematuria: No  joint swelling: No  arthralgias: Yes  headaches: Yes  weakness: Yes  confusion: Yes  dysphoric mood: Yes

## 2019-07-02 NOTE — TELEPHONE ENCOUNTER
----- Message from Carlo Mcclelland sent at 7/2/2019 10:24 AM CDT -----  Regarding: urine orders  Contact: [phone]  Please release this patient's orders so that we can process. thanks

## 2019-07-02 NOTE — TELEPHONE ENCOUNTER
----- Message from Marina Fritz PA-C sent at 7/2/2019  4:24 PM CDT -----  Check with lab to add on urine culture.

## 2019-07-02 NOTE — LETTER
July 8, 2019      Marina Fritz PA-C  [address]  [facility] [address]           [facility] sl[facility] - Hepatology  [address]  [facility] [address]  Phone: [phone]  Fax: [phone]          Patient: Danilo Zambrano   MR Number: [id number]   YOB: 1980   Date of Visit: 7/2/2019       Dear Marina Fritz:    Thank you for referring Danilo Zambrano to me for evaluation. Attached you will find relevant portions of my assessment and plan of care.    If you have questions, please do not hesitate to call me. I look forward to following Danilo Zabmrano along with you.    Sincerely,    Betina Vega MD    Enclosure  CC:  No Recipients    If you would like to receive this communication electronically, please contact [email or web address] or [phone] to request more information on [employer] Link access.    For providers and/or their staff who would like to refer a patient to [facility], please contact us through our one-stop-shop provider referral line, [facility], at [phone].    If you feel you have received this communication in error or would no longer like to receive these types of communications, please e-mail [email or web address]

## 2019-07-02 NOTE — PROGRESS NOTES
"Subjective:       Patient ID: Danilo Zambrano is a 39 y.o. male.    Chief Complaint: Follow-up    HPI     Established pt of Zheng Dobbins MD, MD (Inactive)     Here to follow up from initial visit on 6/19/19.    Pt presented with multiple concerns/symptoms that started after a recent business trip to [place] about 3.5 weeks ago.     Initial Visit 6/19/19   C/o episodic headache (frontal pressure, resolved with tylenol), no HA today). C/o Oral ulcer, recurrent in nature. He has hx of herpes. He is out of valtrex. C/o leg cramps that resolved after massaging. C/o generalized fatigue. He inquires if he symptoms maybe related to acute onset of DM. He performed a finger stick at home with reported value of 83.  No fevers, cough, cp, sob or sore throat. Sleeping well. Appetite good.     Today  7/2/19  Baseline work up at his initital significant for low Vitamin D and elevated LFTs. Subsequent Hepatitis panel negative, Liver US significant for Hepatosplenomegaly and hepatic steatosis.  Subcentimeter simple hepatic cyst. Pt was referred to Hepatology    He reports oral ulcers have resolved after completing course of valacyclovir. Reports he is still dealing with a great deal of fatigue, muscle aching. Occ headache. Intermittent episodes of dark urine. Also reports low sex drive. He feels his fatigue is affecting his work. 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 He feels his symptoms are not related to stress. He is sleeping well about 8 or more hours a night.     Overall symptoms are intermittent, reports "good" days with no issues. He is taking aleve as needed which helps muscle aching/cramps.     Review of patient's allergies indicates:  No Known Allergies    Past Medical History:   Diagnosis Date    Genital herpes     Stable angina 07/03/2017     Social History     Tobacco Use    Smoking status: Never Smoker    Smokeless tobacco: Never Used   Substance Use Topics    " Alcohol use: No    Drug use: No         Review of Systems   Constitutional: Positive for activity change and fatigue. Negative for unexpected weight change.   HENT: Negative for hearing loss, rhinorrhea and trouble swallowing.    Eyes: Negative for discharge and visual disturbance.   Respiratory: Negative for cough, chest tightness, shortness of breath and wheezing.    Cardiovascular: Negative for chest pain and palpitations.   Gastrointestinal: Positive for constipation (resolved, normal BM last few days). Negative for blood in stool, diarrhea and vomiting.   Endocrine: Negative for polydipsia and polyuria.   Genitourinary: Negative for hematuria and urgency.        Darker urine   Musculoskeletal: Positive for arthralgias. Negative for joint swelling and neck pain.   Neurological: Positive for headaches. Negative for weakness.   Psychiatric/Behavioral: Positive for dysphoric mood. The patient is nervous/anxious.        Objective: /80   Pulse 91   Wt 119 kg (262 lb 5.6 oz)   SpO2 96%   BMI 34.61 kg/m²         Physical Exam   Constitutional: He is oriented to person, place, and time. He appears well-developed and well-nourished. No distress.   HENT:   Head: Normocephalic and atraumatic.   Mouth/Throat: Oropharynx is clear and moist.   Eyes: Pupils are equal, round, and reactive to light.   Cardiovascular: Normal rate and regular rhythm. Exam reveals no friction rub.   No murmur heard.  Pulmonary/Chest: Effort normal and breath sounds normal. He has no wheezes. He has no rales.   Abdominal: Soft. Bowel sounds are normal. There is no tenderness.   Neurological: He is alert and oriented to person, place, and time.   Skin: Skin is warm and dry. Capillary refill takes less than 2 seconds. No rash noted.   Psychiatric: His mood appears anxious.   Vitals reviewed.      Assessment:       1. Fatigue, unspecified type    2. Myalgia    3. Nonintractable episodic headache, unspecified headache type    4. Anxiety about  health    5. Decreased sex drive    6. Dark urine        Plan:         Danilo Lockett was seen today for follow-up.    Diagnoses and all orders for this visit:    Fatigue, unspecified type  -     Testosterone; Future    Myalgia  -     CK; Future  -     Sedimentation rate; Future  -     C-reactive protein; Future    Nonintractable episodic headache, unspecified headache type    Decreased sex drive  -     Testosterone; Future    Dark urine  -     URINALYSIS  -     Urine culture; Future    Anxiety about health    Elevated liver enzymes    Other orders  -     Urinalysis Microscopic      Pt has appt today with Hepatology for further eval of elevated LFTS and Liver US.   Unclear etiology of patient symptoms  Will obtain additional lab/work as above.     Marina Fritz PA-C    Addendum:    Significant labs below from IM visit this AM     Lab Results   Component Value Date    CRP 58.8 (H) 07/02/2019     Component      Latest Ref Rng & Units 7/2/2019   Testosterone, Total      304 - 1227 ng/dL 235 (L)       Component      Latest Ref Rng & Units 7/2/2019   CPK      20 - 200 U/L 60       Labs below from Hepatology, several still pending.     Lab Results   Component Value Date    FERRITIN 2,274 (H) 07/02/2019     Lab Results   Component Value Date     (H) 07/02/2019     (H) 07/02/2019    ALKPHOS 78 07/02/2019    BILITOT 1.3 (H) 07/02/2019       Continue to follow pending lab.     Marina Fritz PA-C

## 2019-07-03 ENCOUNTER — PATIENT MESSAGE (OUTPATIENT)
Dept: INTERNAL MEDICINE | Facility: CLINIC | Age: 39
End: 2019-07-03

## 2019-07-03 ENCOUNTER — PATIENT MESSAGE (OUTPATIENT)
Dept: HEPATOLOGY | Facility: CLINIC | Age: 39
End: 2019-07-03

## 2019-07-03 DIAGNOSIS — R53.83 FATIGUE, UNSPECIFIED TYPE: ICD-10-CM

## 2019-07-03 DIAGNOSIS — R74.8 ELEVATED LIVER ENZYMES: Primary | ICD-10-CM

## 2019-07-03 DIAGNOSIS — R79.82 ELEVATED C-REACTIVE PROTEIN (CRP): ICD-10-CM

## 2019-07-03 DIAGNOSIS — R79.89 ELEVATED FERRITIN: ICD-10-CM

## 2019-07-03 DIAGNOSIS — R79.89 ELEVATED LFTS: Primary | ICD-10-CM

## 2019-07-03 LAB — HIV1+2 IGG SERPL QL IA.RAPID: NEGATIVE

## 2019-07-03 NOTE — TELEPHONE ENCOUNTER
Spoke with patient. Discussed his elevated CRP level is of concern. Pt also with significantly elevated Ferritin and LFTS are trending upward. Some hepatology labs are still pending. Discussed with patient to await full results and Hepatology recommendations.

## 2019-07-08 PROBLEM — R74.8 ELEVATED LIVER ENZYMES: Status: ACTIVE | Noted: 2019-07-08

## 2019-07-08 PROBLEM — R16.2 HEPATOSPLENOMEGALY: Status: ACTIVE | Noted: 2019-07-08

## 2019-07-08 PROBLEM — K76.89 HEPATIC CYST: Status: ACTIVE | Noted: 2019-07-08

## 2019-07-08 NOTE — PROGRESS NOTES
Hepatology Consult Note    Referring provider: Marina Fritz    Chief complaint:   Chief Complaint   Patient presents with    Hepatosplenomegaly    Elevated Hepatic Enzymes       HPI:  Danilo Zambrano is a pleasant 39 y.o. malewho was referred to Hepatology Clinic for consultation of had concerns including Hepatosplenomegaly and Elevated Hepatic Enzymes..      Patient is a video game developed, recent hx of travel to [place]. Since his return, he has been feeling malaise, fatigue and not at usual state of health.  His liver enzymes are elevated. He has no hx of underlying liver disease. He denies family hx of liver disease. He denies alcohol use.      Patient Active Problem List   Diagnosis    Genital herpes       Past Medical History:   Diagnosis Date    Genital herpes     Stable angina 07/03/2017       Past Surgical History:   Procedure Laterality Date    cardiac cath  07/03/2017    stable angina, Dr. Dobbins    HEART CATH-LEFT Left 7/3/2017    Performed [facility] at [facility] CATH    NASAL SEPTUM SURGERY      TONSILLECTOMY         Family History   Problem Relation Age of Onset    Heart disease Father        Social History     Socioeconomic History    Marital status:      Spouse name: Not on file    Number of children: Not on file    Years of education: Not on file    Highest education level: Not on file   Occupational History    Not on file   Social Needs    Financial resource strain: Not hard at all    Food insecurity:     Worry: Never true     Inability: Never true    Transportation needs:     Medical: No     Non-medical: No   Tobacco Use    Smoking status: Never Smoker    Smokeless tobacco: Never Used   Substance and Sexual Activity    Alcohol use: No    Drug use: No    Sexual activity: Yes   Lifestyle    Physical activity:     Days per week: Not on file     Minutes per session: Not on file    Stress: Not on file   Relationships    Social connections:     Talks on  "phone: Patient refused     Gets together: Patient refused     Attends [employer] service: Not on file     Active member of club or organization: Patient refused     Attends meetings of clubs or organizations: Patient refused     Relationship status:    Other Topics Concern    Not on file   Social History Narrative    Not on file       Current Outpatient Medications   Medication Sig Dispense Refill    ergocalciferol (ERGOCALCIFEROL) 50,000 unit Cap Take 1 capsule (50,000 Units total) by mouth every 7 days. 8 capsule 0    valACYclovir (VALTREX) 500 MG tablet Take 1 tablet (500 mg total) by mouth 2 (two) times daily. 30 tablet 3     No current facility-administered medications for this visit.        Review of patient's allergies indicates:  No Known Allergies    Review of Systems   Constitutional: Positive for malaise/fatigue. Negative for chills, fever and weight loss.   HENT: Negative for congestion, nosebleeds and sore throat.    Eyes: Negative for blurred vision, double vision and photophobia.   Respiratory: Negative for cough and shortness of breath.    Cardiovascular: Negative for chest pain, palpitations, orthopnea and leg swelling.   Gastrointestinal: Negative for abdominal pain, blood in stool, constipation, diarrhea, melena and vomiting.   Genitourinary: Negative for dysuria.   Musculoskeletal: Negative for falls and joint pain.   Skin: Negative for itching and rash.   Neurological: Negative for dizziness, tremors and weakness.   Endo/Heme/Allergies: Does not bruise/bleed easily.   Psychiatric/Behavioral: Negative for depression and substance abuse. The patient is not nervous/anxious and does not have insomnia.        Vitals:    07/02/19 1512   BP: 115/75   Pulse: 99   Resp: 18   SpO2: 96%   Weight: 120 kg (264 lb 8.8 oz)   Height: 6' 2" (1.88 m)       Physical Exam   Constitutional: He is oriented to person, place, and time. He appears well-developed and well-nourished. No distress.   HENT:   Head: " Normocephalic and atraumatic.   Mouth/Throat: Oropharynx is clear and moist.   Eyes: Conjunctivae are normal. No scleral icterus.   Neck: Normal range of motion.   Cardiovascular: Normal rate, regular rhythm, normal heart sounds and intact distal pulses.   Pulmonary/Chest: Effort normal and breath sounds normal. No respiratory distress. He has no wheezes. He has no rales.   Abdominal: Soft. Normal appearance and bowel sounds are normal. He exhibits no shifting dullness, no distension, no pulsatile liver, no fluid wave and no ascites. There is no splenomegaly or hepatomegaly. No hernia.   Musculoskeletal: He exhibits no edema.   Neurological: He is alert and oriented to person, place, and time. He is not disoriented.   Skin: Skin is warm and dry. No rash noted. He is not diaphoretic.   Psychiatric: He has a normal mood and affect. His behavior is normal. His mood appears not anxious. His affect is not inappropriate. He is not agitated. He is communicative. He is attentive.   Nursing note and vitals reviewed.      LABS: I personally reviewed pertinent laboratory findings.    Lab Results   Component Value Date     (H) 07/02/2019     (H) 07/02/2019    ALKPHOS 78 07/02/2019    BILITOT 1.3 (H) 07/02/2019       Lab Results   Component Value Date    WBC 5.31 06/19/2019    HGB 14.7 06/19/2019    HCT 43.2 06/19/2019    MCV 88 06/19/2019     (L) 06/19/2019       Lab Results   Component Value Date     06/19/2019    K 4.0 06/19/2019     06/19/2019    CO2 26 06/19/2019    BUN 14 06/19/2019    CREATININE 1.2 06/19/2019    CALCIUM 9.7 06/19/2019    ANIONGAP 13 06/19/2019    ESTGFRAFRICA >60 06/19/2019    EGFRNONAA >60 06/19/2019       Lab Results   Component Value Date    HEPAIGM Negative 06/25/2019    HEPBIGM Negative 06/25/2019    HEPCAB Negative 06/25/2019       Lab Results   Component Value Date    SMOOTHMUSCAB Negative 1:40 07/02/2019       I personally reviewed all recent lab results.  I  personally reviewed imaging studies.    Assessment:  39 y.o. male presenting with     1. Elevated liver enzymes    2. Hepatosplenomegaly    3. Hepatic cyst      Ddx: ?viral infection, he likely has underlying NAFLD. He denies supplement/herbal use.    Recommendation(s):  - will order serologies/autoimmune markers/iron/ceruloplasmin to r/o other possible causes of chronic liver disease for the sake of thoroughness in work-up  - if liver enzymes go up, will need liver biopsy to r/o [facility]  - will also obtian US w/ doppler to r/o [facility]    A total of 45 minutes were spent face-to-face with the patient during this encounter and over half of that time was spent on counseling and coordination of care.  We discussed in depth the nature of the patient's liver disease, the management plan in details. I also educated the patient about lifestyle modifications which may improve hepatic steatosis, overweight/obesity, insulin resistance and high blood pressure issues. I have provided the patient with an opportunity to ask questions and have all questions answered to his satisfaction.     I have sent communication to the referring physician and/or primary care provider.    Betina Vega MD  Staff Physician  Hepatology and Liver Transplant  [facility]  [facility]

## 2019-07-09 ENCOUNTER — PROCEDURE VISIT (OUTPATIENT)
Dept: HEPATOLOGY | Facility: CLINIC | Age: 39
End: 2019-07-09
Attending: INTERNAL MEDICINE
Payer: COMMERCIAL

## 2019-07-09 ENCOUNTER — PATIENT MESSAGE (OUTPATIENT)
Dept: HEPATOLOGY | Facility: CLINIC | Age: 39
End: 2019-07-09

## 2019-07-09 DIAGNOSIS — R74.8 ELEVATED LIVER ENZYMES: ICD-10-CM

## 2019-07-09 PROCEDURE — 91200 LIVER ELASTOGRAPHY: CPT | Mod: S$GLB,,, | Performed by: INTERNAL MEDICINE

## 2019-07-09 PROCEDURE — 91200 PR LIVER ELASTOGRAPHY W/OUT IMAG W/INTERP & REPORT: ICD-10-PCS | Mod: S$GLB,,, | Performed by: INTERNAL MEDICINE

## 2019-07-09 NOTE — PROCEDURES
Procedures   Vibration-controlled Transient Elastography Procedure     Name: Danilo Zambrano  Date of Procedure : 2019   :: Betina Vega MD  Diagnosis: NAFLD    Probe: XL    Universal Protocol: Patient's identity, procedure and site were verified, confirmatory pause was performed.  Discussed procedure including risks and potential complications.  Questions answered.  Patient verbalizes understanding and wishes to proceed with VCTE.     Procedure: After providing explanations of the procedure, patient was placed in the supine position with right arm in maximum abduction to allow optimal exposure of right lateral abdomen.  Patient was briefly assessed, Testing was performed in the mid-axillary location, 50Hz Shear Wave pulses were applied and the resulting Shear Wave and Propagation Speed detected with a 3.5 MHz ultrasonic signal, using the FibroScan probe, Skin to liver capsule distance and liver parenchyma were accessed during the entire examination with the FibroScan probe, Patient was instructed to breathe normally and to abstain from sudden movements during the procedure, allowing for random measurements of liver stiffness. At least 10 Shear Waves were produced, Individual measurements of each Shear Wave were calculated.  Patient tolerated the procedure well with no complications.  Meets discharge criteria as was dismissed.  Rates pain 0 out of 10.  Patient will follow up with ordering provider to review results.      Findings  Median liver stiffness score: 8.1 KPa  CAP readin dB/m    IQR/med: 10 %    Interpretation  Fibrosis interpretation is based on medial liver stiffness - Kilopascal (kPa).     Fibrosis stage: F2     Steatosis interpretation is based on controlled attenuation parameter - (dB/m).    Steatosis grade: S3       Betina Vega MD  Staff Physician  Transplant Hepatology  [facility]

## 2019-07-16 ENCOUNTER — HOSPITAL ENCOUNTER (OUTPATIENT)
Dept: RADIOLOGY | Facility: HOSPITAL | Age: 39
Discharge: HOME | End: 2019-07-16
Attending: INTERNAL MEDICINE
Payer: COMMERCIAL

## 2019-07-16 ENCOUNTER — PATIENT MESSAGE (OUTPATIENT)
Dept: HEPATOLOGY | Facility: CLINIC | Age: 39
End: 2019-07-16

## 2019-07-16 DIAGNOSIS — R74.8 ELEVATED LIVER ENZYMES: ICD-10-CM

## 2019-07-16 DIAGNOSIS — R74.8 ELEVATED LIVER ENZYMES: Primary | ICD-10-CM

## 2019-07-16 PROCEDURE — 93975 VASCULAR STUDY: CPT | Mod: TC

## 2019-07-16 PROCEDURE — 93975 VASCULAR STUDY: CPT | Mod: 26,,, | Performed by: RADIOLOGY

## 2019-07-16 PROCEDURE — 76705 ECHO EXAM OF ABDOMEN: CPT | Mod: TC

## 2019-07-16 PROCEDURE — 76705 ECHO EXAM OF ABDOMEN: CPT | Mod: 26,XS,, | Performed by: RADIOLOGY

## 2019-07-16 PROCEDURE — 93975 US LIVER WITH DOPPLER: ICD-10-PCS | Mod: 26,,, | Performed by: RADIOLOGY

## 2019-07-16 PROCEDURE — 76705 US LIVER WITH DOPPLER: ICD-10-PCS | Mod: 26,XS,, | Performed by: RADIOLOGY

## 2019-08-21 DIAGNOSIS — K12.1 ORAL ULCER: ICD-10-CM

## 2019-08-21 DIAGNOSIS — Z86.19 HISTORY OF HERPES GENITALIS: ICD-10-CM

## 2019-08-21 RX ORDER — VALACYCLOVIR HYDROCHLORIDE 500 MG/1
500 TABLET, FILM COATED ORAL 2 TIMES DAILY
Qty: 180 TABLET | Refills: 3 | Status: SHIPPED | OUTPATIENT
Start: 2019-08-21 | End: 2020-07-01

## 2019-08-23 DIAGNOSIS — Z86.19 HISTORY OF HERPES GENITALIS: ICD-10-CM

## 2019-08-23 DIAGNOSIS — K12.1 ORAL ULCER: ICD-10-CM

## 2019-08-23 DIAGNOSIS — E55.9 VITAMIN D DEFICIENCY: ICD-10-CM

## 2019-08-23 RX ORDER — ERGOCALCIFEROL 1.25 MG/1
50000 CAPSULE ORAL
Qty: 8 CAPSULE | Refills: 0 | OUTPATIENT
Start: 2019-08-23

## 2019-08-23 RX ORDER — VALACYCLOVIR HYDROCHLORIDE 500 MG/1
500 TABLET, FILM COATED ORAL 2 TIMES DAILY
Qty: 180 TABLET | Refills: 3 | OUTPATIENT
Start: 2019-08-23 | End: 2020-03-29

## 2019-08-23 NOTE — TELEPHONE ENCOUNTER
None urgent meds that were recently refilled. Please should follow with PCP (or establish PCP if none) for future refills.

## 2020-02-26 ENCOUNTER — OFFICE VISIT (OUTPATIENT)
Dept: OTOLARYNGOLOGY | Facility: CLINIC | Age: 40
End: 2020-02-26
Payer: COMMERCIAL

## 2020-02-26 VITALS
TEMPERATURE: 97 F | WEIGHT: 264.25 LBS | SYSTOLIC BLOOD PRESSURE: 136 MMHG | HEART RATE: 77 BPM | HEIGHT: 74 IN | DIASTOLIC BLOOD PRESSURE: 86 MMHG | BODY MASS INDEX: 33.91 KG/M2

## 2020-02-26 DIAGNOSIS — J01.90 ACUTE NON-RECURRENT SINUSITIS, UNSPECIFIED LOCATION: Primary | ICD-10-CM

## 2020-02-26 DIAGNOSIS — R05.8 PRODUCTIVE COUGH: ICD-10-CM

## 2020-02-26 PROCEDURE — 3008F PR BODY MASS INDEX (BMI) DOCUMENTED: ICD-10-PCS | Mod: CPTII,S$GLB,, | Performed by: OTOLARYNGOLOGY

## 2020-02-26 PROCEDURE — 99999 PR PBB SHADOW E&M-EST. PATIENT-LVL III: CPT | Mod: PBBFAC,,, | Performed by: OTOLARYNGOLOGY

## 2020-02-26 PROCEDURE — 96372 PR INJECTION,THERAP/PROPH/DIAG2ST, IM OR SUBCUT: ICD-10-PCS | Mod: S$GLB,,, | Performed by: OTOLARYNGOLOGY

## 2020-02-26 PROCEDURE — 96372 THER/PROPH/DIAG INJ SC/IM: CPT | Mod: S$GLB,,, | Performed by: OTOLARYNGOLOGY

## 2020-02-26 PROCEDURE — 99203 PR OFFICE/OUTPT VISIT, NEW, LEVL III, 30-44 MIN: ICD-10-PCS | Mod: 25,S$GLB,, | Performed by: OTOLARYNGOLOGY

## 2020-02-26 PROCEDURE — 99999 PR PBB SHADOW E&M-EST. PATIENT-LVL III: ICD-10-PCS | Mod: PBBFAC,,, | Performed by: OTOLARYNGOLOGY

## 2020-02-26 PROCEDURE — 3008F BODY MASS INDEX DOCD: CPT | Mod: CPTII,S$GLB,, | Performed by: OTOLARYNGOLOGY

## 2020-02-26 PROCEDURE — 99203 OFFICE O/P NEW LOW 30 MIN: CPT | Mod: 25,S$GLB,, | Performed by: OTOLARYNGOLOGY

## 2020-02-26 RX ORDER — BENZONATATE 200 MG/1
200 CAPSULE ORAL 3 TIMES DAILY PRN
Qty: 20 CAPSULE | Refills: 0 | Status: SHIPPED | OUTPATIENT
Start: 2020-02-26 | End: 2020-03-07

## 2020-02-26 RX ORDER — METHYLPREDNISOLONE ACETATE 40 MG/ML
40 INJECTION, SUSPENSION INTRA-ARTICULAR; INTRALESIONAL; INTRAMUSCULAR; SOFT TISSUE
Status: COMPLETED | OUTPATIENT
Start: 2020-02-26 | End: 2020-02-26

## 2020-02-26 RX ORDER — PROMETHAZINE HYDROCHLORIDE AND DEXTROMETHORPHAN HYDROBROMIDE 6.25; 15 MG/5ML; MG/5ML
5 SYRUP ORAL NIGHTLY PRN
Qty: 118 ML | Refills: 0 | Status: SHIPPED | OUTPATIENT
Start: 2020-02-26 | End: 2020-03-07

## 2020-02-26 RX ORDER — AMOXICILLIN AND CLAVULANATE POTASSIUM 875; 125 MG/1; MG/1
1 TABLET, FILM COATED ORAL 2 TIMES DAILY
Qty: 14 TABLET | Refills: 0 | Status: SHIPPED | OUTPATIENT
Start: 2020-02-26 | End: 2020-03-04

## 2020-02-26 RX ADMIN — METHYLPREDNISOLONE ACETATE 40 MG: 40 INJECTION, SUSPENSION INTRA-ARTICULAR; INTRALESIONAL; INTRAMUSCULAR; SOFT TISSUE at 09:02

## 2020-02-26 NOTE — PROGRESS NOTES
Otolaryngology Clinic Note    Danilo Zambrano  Encounter Date: 2/26/2020   YOB: 1980  Referring Physician: Aaareferral Self  No address on file   PCP: Zheng Dobbins MD, MD (Inactive)    Chief Complaint:   Chief Complaint   Patient presents with    Cough     productive. started 02/21    Nasal Congestion     states he has thick yellow mucous       HPI: Danilo Zambrano is a 39 y.o. male here for cough and shortness of breath.    Reports symptoms started last Friday. Symptoms include fevers, chills, body aches, cough, nasal congestion. Was also having bad sinus pressure. Was taking advil over the weekend. Also reports shortness of breath and wheezing. Has not seen PCP. Last fever was yesterday per patient although no reported temperature.    Took zyrtec D which seems to help a little bit. Taking OTC delsym for cough.    Has had multiple sick contacts including flu. Did not get flu shot.     Symptoms have been worsening.     Review of Systems   Constitutional: Negative for chills, fever and weight loss.   HENT: Positive for congestion and sinus pain.    Eyes: Negative for blurred vision and double vision.   Respiratory: Positive for cough, shortness of breath and wheezing.    Cardiovascular: Negative for chest pain and palpitations.   Gastrointestinal: Negative for nausea and vomiting.   Musculoskeletal: Negative for joint pain and neck pain.   Skin: Negative for rash.   Neurological: Negative for dizziness, focal weakness and headaches.   Psychiatric/Behavioral: Negative for depression. The patient is not nervous/anxious.         Review of patient's allergies indicates:  No Known Allergies    Past Medical History:   Diagnosis Date    Genital herpes     Stable angina 07/03/2017       Past Surgical History:   Procedure Laterality Date    cardiac cath  07/03/2017    Dr. Shilpi zambrano    NASAL SEPTUM SURGERY      TONSILLECTOMY         Social History     Socioeconomic History    Marital  status:      Spouse name: Not on file    Number of children: Not on file    Years of education: Not on file    Highest education level: Not on file   Occupational History    Not on file   Social Needs    Financial resource strain: Not hard at all    Food insecurity:     Worry: Never true     Inability: Never true    Transportation needs:     Medical: No     Non-medical: No   Tobacco Use    Smoking status: Never Smoker    Smokeless tobacco: Never Used   Substance and Sexual Activity    Alcohol use: No    Drug use: No    Sexual activity: Yes   Lifestyle    Physical activity:     Days per week: Not on file     Minutes per session: Not on file    Stress: Not on file   Relationships    Social connections:     Talks on phone: Patient refused     Gets together: Patient refused     Attends Jewish service: Not on file     Active member of club or organization: Patient refused     Attends meetings of clubs or organizations: Patient refused     Relationship status:    Other Topics Concern    Not on file   Social History Narrative    Not on file       Family History   Problem Relation Age of Onset    Heart disease Father        Outpatient Encounter Medications as of 2/26/2020   Medication Sig Dispense Refill    amoxicillin-clavulanate 875-125mg (AUGMENTIN) 875-125 mg per tablet Take 1 tablet by mouth 2 (two) times daily. for 7 days 14 tablet 0    benzonatate (TESSALON) 200 MG capsule Take 1 capsule (200 mg total) by mouth 3 (three) times daily as needed for Cough. 20 capsule 0    ergocalciferol (ERGOCALCIFEROL) 50,000 unit Cap Take 1 capsule (50,000 Units total) by mouth every 7 days. (Patient not taking: Reported on 2/26/2020) 8 capsule 0    promethazine-dextromethorphan (PROMETHAZINE-DM) 6.25-15 mg/5 mL Syrp Take 5 mLs by mouth nightly as needed (coughing). 118 mL 0    valACYclovir (VALTREX) 500 MG tablet Take 1 tablet (500 mg total) by mouth 2 (two) times daily. (Patient not taking:  Reported on 2/26/2020) 180 tablet 3     Facility-Administered Encounter Medications as of 2/26/2020   Medication Dose Route Frequency Provider Last Rate Last Dose    [COMPLETED] methylPREDNISolone acetate injection 40 mg  40 mg Intramuscular 1 time in Clinic/HOD Tete Moore MD   40 mg at 02/26/20 0922       Physical Exam:    Vitals:    02/26/20 0823   BP: 136/86   Pulse: 77   Temp: 97.4 °F (36.3 °C)       Constitutional  General Appearance: well nourished, well-developed, alert, oriented, in no acute distress  Communication: ability, understanding, voice quality normal  Head and Face  Inspection: normocephalic, atraumatic, no scars, lesions or masses    Palpation: no stepoffs, sinus tenderness or masses  Parotid glands: no masses, stones, swelling or tenderness  Eyes  Ocular Motility / Alignment: normal alignment, motility, no proptosis, enophthalmus or nystagmus  Conjunctiva: not injected  Eyelids: no entropion or ectropion, no edema  Ears  Hearing: speech reception thresholds grossly normal  External Ears: no auricle lesions, non-tender, mobile to palpation  Otoscopy:  Right Ear: no tympanic membrane lesions, perforations, or effusion, normal EAC  Left Ear: no tympanic membrane lesions, perforations, or effusion, normal EAC  Nose  External Nose: no lesions, tenderness, trauma or deformity  Intranasal Exam: mucosal edema, thick purulent nasal drainage  Oral Cavity / Oropharynx  Lips: upper and lower lips pink and moist  Teeth: dentition good  Gingiva: healthy  Oral Mucosa: moist, no mucosal lesions  Floor of Mouth: normal, no lesions  Tongue: moist, normal mobility, no lesions  Palate: soft and hard palates without lesions or ulcers  Oropharynx: tonsils and walls without erythema, exudate, lesions, fullness or obstruction - thick purulent post nasal drip  Neck  Inspection and Palpation: no erythema, induration, emphysema, tenderness or masses  Larynx and Trachea: normal position and mobility   Thyroid:  no tenderness, enlargement or nodules  Submandibular Glands: no masses or tenderness  Lymphatic:  Anterior, Posterior, Submandibular, Submental, Supraclavicular: no lymphadenopathy present  Chest / Respiratory  Chest: no stridor or retractions, normal effort and expansion - clear on auscultation, coughing throughout exam  Cardiovascular:  Pulses: 2+ radial pulses, regular rhythm and rate  Auscultation: deferred  Neurological  Cranial Nerves: grossly intact  General: no focal deficits  Psychiatric  Orientation: oriented to time, place and person  Mood and Affect: no depression, anxiety or agitation  Extremities  Inspection: moves all extremities well    Procedures:  No notes on file    Pertinent Data:  ? LABS:   ? AUDIO:  ? PATH:      I personally reviewed the following pertinent data at today's visit:    Imaging:   ? XRAY:  ? Ultrasound:  ? CT Scan:  ? MRI Scan:  ? PET/CT Scan:    I personally reviewed the following images:    Summary of Outside Records:      Assessment and Plan:  Danilo Zambrano is a 39 y.o. male with     Acute non-recurrent sinusitis, unspecified location  -     POCT INFLUENZA A/B  -     amoxicillin-clavulanate 875-125mg (AUGMENTIN) 875-125 mg per tablet; Take 1 tablet by mouth 2 (two) times daily. for 7 days  Dispense: 14 tablet; Refill: 0  -     methylPREDNISolone acetate injection 40 mg    Productive cough  -     amoxicillin-clavulanate 875-125mg (AUGMENTIN) 875-125 mg per tablet; Take 1 tablet by mouth 2 (two) times daily. for 7 days  Dispense: 14 tablet; Refill: 0  -     benzonatate (TESSALON) 200 MG capsule; Take 1 capsule (200 mg total) by mouth 3 (three) times daily as needed for Cough.  Dispense: 20 capsule; Refill: 0  -     promethazine-dextromethorphan (PROMETHAZINE-DM) 6.25-15 mg/5 mL Syrp; Take 5 mLs by mouth nightly as needed (coughing).  Dispense: 118 mL; Refill: 0  -     methylPREDNISolone acetate injection 40 mg         Flu negative. Patient symptoms have only been present  for 5-6 days but have been worsening and pretty severe with fever, cough and malaise. Due to symptoms in addition to frankly purulent nasal secretions seen on anterior rhinoscopy as well as on his posterior pharyngeal wall via intraoral exam I am treating him with a course of Augmentin. Also given cough medication and steroid injection today. If shortness of breath worsens he is to let us know or go to Urgent Care/PCP immediately. Would consider CXR to rule out pneumonia although lungs clear today on exam. He will call for appointment if no improvement.      POONAM Candelario MD  [facility]

## 2020-02-26 NOTE — PATIENT INSTRUCTIONS
Recommend nasal saline and flonase twice daily.    Recommend mucinex DM to help loosen mucous.    Get plenty of rest, hydration.

## 2020-06-30 ENCOUNTER — TELEPHONE (OUTPATIENT)
Dept: HEPATOLOGY | Facility: CLINIC | Age: 40
End: 2020-06-30

## 2020-06-30 DIAGNOSIS — R74.8 ELEVATED LIVER ENZYMES: Primary | ICD-10-CM

## 2020-07-01 ENCOUNTER — OFFICE VISIT (OUTPATIENT)
Dept: INTERNAL MEDICINE | Facility: CLINIC | Age: 40
End: 2020-07-01
Payer: COMMERCIAL

## 2020-07-01 ENCOUNTER — HOSPITAL ENCOUNTER (OUTPATIENT)
Dept: RADIOLOGY | Facility: HOSPITAL | Age: 40
Discharge: HOME | End: 2020-07-01
Attending: NURSE PRACTITIONER
Payer: COMMERCIAL

## 2020-07-01 VITALS
DIASTOLIC BLOOD PRESSURE: 84 MMHG | HEART RATE: 76 BPM | TEMPERATURE: 100 F | SYSTOLIC BLOOD PRESSURE: 122 MMHG | BODY MASS INDEX: 34.52 KG/M2 | WEIGHT: 269 LBS | HEIGHT: 74 IN | OXYGEN SATURATION: 96 %

## 2020-07-01 DIAGNOSIS — R10.84 GENERALIZED ABDOMINAL PAIN: ICD-10-CM

## 2020-07-01 DIAGNOSIS — R10.9 ACUTE RIGHT FLANK PAIN: Primary | ICD-10-CM

## 2020-07-01 DIAGNOSIS — E66.9 OBESITY (BMI 30-39.9): ICD-10-CM

## 2020-07-01 DIAGNOSIS — R10.9 ACUTE RIGHT FLANK PAIN: ICD-10-CM

## 2020-07-01 DIAGNOSIS — R74.8 ELEVATED LIVER ENZYMES: ICD-10-CM

## 2020-07-01 DIAGNOSIS — R16.2 HEPATOSPLENOMEGALY: ICD-10-CM

## 2020-07-01 LAB
BACTERIA #/AREA URNS AUTO: ABNORMAL /HPF
BILIRUB UR QL STRIP: NEGATIVE
CLARITY UR REFRACT.AUTO: ABNORMAL
COLOR UR AUTO: YELLOW
GLUCOSE UR QL STRIP: NEGATIVE
HGB UR QL STRIP: ABNORMAL
KETONES UR QL STRIP: NEGATIVE
LEUKOCYTE ESTERASE UR QL STRIP: ABNORMAL
MICROSCOPIC COMMENT: ABNORMAL
NITRITE UR QL STRIP: NEGATIVE
PH UR STRIP: 5 [PH] (ref 5–8)
PROT UR QL STRIP: NEGATIVE
RBC #/AREA URNS AUTO: 3 /HPF (ref 0–4)
SP GR UR STRIP: 1.02 (ref 1–1.03)
SQUAMOUS #/AREA URNS AUTO: 0 /HPF
URN SPEC COLLECT METH UR: ABNORMAL
WBC #/AREA URNS AUTO: 16 /HPF (ref 0–5)
WBC CLUMPS UR QL AUTO: ABNORMAL

## 2020-07-01 PROCEDURE — 81001 URINALYSIS AUTO W/SCOPE: CPT

## 2020-07-01 PROCEDURE — 3008F PR BODY MASS INDEX (BMI) DOCUMENTED: ICD-10-PCS | Mod: CPTII,S$GLB,, | Performed by: NURSE PRACTITIONER

## 2020-07-01 PROCEDURE — 99999 PR PBB SHADOW E&M-EST. PATIENT-LVL IV: ICD-10-PCS | Mod: PBBFAC,,, | Performed by: NURSE PRACTITIONER

## 2020-07-01 PROCEDURE — 99214 OFFICE O/P EST MOD 30 MIN: CPT | Mod: S$GLB,,, | Performed by: NURSE PRACTITIONER

## 2020-07-01 PROCEDURE — 99999 PR PBB SHADOW E&M-EST. PATIENT-LVL IV: CPT | Mod: PBBFAC,,, | Performed by: NURSE PRACTITIONER

## 2020-07-01 PROCEDURE — 74176 CT RENAL STONE STUDY ABD PELVIS WO: ICD-10-PCS | Mod: 26,,, | Performed by: RADIOLOGY

## 2020-07-01 PROCEDURE — 74176 CT ABD & PELVIS W/O CONTRAST: CPT | Mod: 26,,, | Performed by: RADIOLOGY

## 2020-07-01 PROCEDURE — 74176 CT ABD & PELVIS W/O CONTRAST: CPT | Mod: TC

## 2020-07-01 PROCEDURE — 3008F BODY MASS INDEX DOCD: CPT | Mod: CPTII,S$GLB,, | Performed by: NURSE PRACTITIONER

## 2020-07-01 PROCEDURE — 99214 PR OFFICE/OUTPT VISIT, EST, LEVL IV, 30-39 MIN: ICD-10-PCS | Mod: S$GLB,,, | Performed by: NURSE PRACTITIONER

## 2020-07-01 NOTE — PATIENT INSTRUCTIONS
Let's check Urinalysis and CT Renal Stone study to check for kidney stone    Continue hydration with water, avoid alcohol and sodas    Keep Ultrasound appt and labs per liver specialist as scheduled for tomorrow      Identifying Kidney Stones  There are 4 general types of kidney stones. Your kidney stones size and shape determine whether it is likely to pass by itself. Knowing what a stone is made of (its composition) helps your healthcare provider find its cause. Then he or she can suggest the best treatment. X-rays or scans can help show the stone's size and shape. Your healthcare provider may also give you a strainer. You can use this to catch the stone while passing urine, and the provider can then test the stone. Other urine and blood tests may be done to help identify the stone. These tests can also help identify causes for different types of stones.     Size  A stone may be as small as a grain of sand. Or it may be as large as a golf ball. Small stones may pass out of your body when you urinate.   Shape  Small, smooth, round stones may pass easily. Jagged-edged stones often lodge inside the kidney or ureter. Staghorn stones can fill the entire renal pelvis and calyces.   Composition  Most stones are made of calcium oxalate, a hard compound. Stones made of cystine or uric acid, or caused by infection (struvite stones), are less dense. Stones often contain more than one chemical.      Your kidneys filter your blood and release chemicals into the urine. If certain chemicals build up in the kidneys, they can form a stone.   Treating your stones  You and your healthcare provider will work together to form a treatment plan. Your provider may suggest that you let your stone pass naturally. Or you may manage it with medicines. Certain procedures may also help, such as SWL (shock wave lithotripsy) or using a camera inside the body to remove the stone (ureteroscopy). And you will be told how you can help prevent  kidney stones in the future.  Date Last Reviewed: 1/1/2017  © 7565-0185 [employer], [employer]. [address] [place] [address], [place] [address]. All rights reserved. This information is not intended as a substitute for professional medical care. Always follow your healthcare professional's instructions.

## 2020-07-01 NOTE — PROGRESS NOTES
"Subjective:       Patient ID: Danilo Zambrano is a 40 y.o. male.    Chief Complaint: Abdominal Pain    Pt of Dr. Hidalgo, here for, "I need to know where this pain is coming from."    Reports abdominal pain, has US of abdomen per Liver doctor scheduled for tomorrow. They see him for hepatospleenomegaly and elevated liver enzymes discovered last year. Pt reached out to them yesterday and they ordered this tests as follow up on last year's labs which he did not follow up on. He is telling me he thinks his issues are kidney stones and not that. I advised him this is a separate problem worked up by Hepatology and he needs to complete their workup on that issue    I have reviewed the HPI and ROS info pt entered in portal prior to visit today below      Back Pain  This is a new problem. The current episode started in the past 7 days. The problem occurs constantly. The problem has been waxing and waning since onset. The pain is present in the lumbar spine. The quality of the pain is described as aching, burning and shooting. The pain radiates to the right thigh. The pain is at a severity of 8/10. The pain is moderate. The pain is worse during the day. The symptoms are aggravated by position, sitting and stress. Stiffness is present at night. Associated symptoms include abdominal pain. Pertinent negatives include no bladder incontinence, bowel incontinence, chest pain, dysuria, fever, headaches, leg pain, numbness, paresis, paresthesias, pelvic pain, perianal numbness, tingling, weakness or weight loss. Risk factors include obesity, lack of exercise and sedentary lifestyle. He has tried analgesics and NSAIDs (4 advils Saturday helped) for the symptoms. The treatment provided significant relief.     Review of Systems   Constitutional: Negative for activity change, appetite change, chills, diaphoresis, fatigue, fever, unexpected weight change and weight loss.   Cardiovascular: Negative for chest pain.   Gastrointestinal: " Positive for abdominal pain. Negative for abdominal distention, anal bleeding, blood in stool, bowel incontinence, change in bowel habit, constipation, diarrhea, nausea, rectal pain, vomiting, reflux, fecal incontinence and change in bowel habit.   Genitourinary: Positive for flank pain and frequency. Negative for bladder incontinence, decreased urine volume, dysuria, hematuria, pelvic pain, scrotal swelling, testicular pain and urgency.   Musculoskeletal: Negative for back pain, leg pain and myalgias.   Allergic/Immunologic: Negative for environmental allergies, food allergies and frequent infections.   Neurological: Negative for dizziness, tingling, weakness, numbness, headaches, disturbances in coordination, paresthesias and coordination difficulties.   Hematological: Negative for adenopathy. Does not bruise/bleed easily.   Psychiatric/Behavioral: Negative for suicidal ideas.        Review of patient's allergies indicates:  No Known Allergies    No current outpatient medications on file.    Patient Active Problem List   Diagnosis    Genital herpes    Elevated liver enzymes    Hepatosplenomegaly    Hepatic cyst     Past Medical History:   Diagnosis Date    Genital herpes     Stable angina 07/03/2017     Past Surgical History:   Procedure Laterality Date    cardiac cath  07/03/2017    stable angina, Dr. Dobbins    NASAL SEPTUM SURGERY      TONSILLECTOMY       Social History     Socioeconomic History    Marital status:      Spouse name: Not on file    Number of children: Not on file    Years of education: Not on file    Highest education level: Not on file   Occupational History    Not on file   Social Needs    Financial resource strain: Not hard at all    Food insecurity     Worry: Never true     Inability: Never true    Transportation needs     Medical: No     Non-medical: No   Tobacco Use    Smoking status: Never Smoker    Smokeless tobacco: Never Used   Substance and Sexual Activity     "Alcohol use: No     Frequency: Monthly or less     Drinks per session: 1 or 2     Binge frequency: Never    Drug use: No    Sexual activity: Yes   Lifestyle    Physical activity     Days per week: 0 days     Minutes per session: 0 min    Stress: Very much   Relationships    Social connections     Talks on phone: Once a week     Gets together: Never     Attends Mandaen service: Not on file     Active member of club or organization: No     Attends meetings of clubs or organizations: Never     Relationship status:    Other Topics Concern    Not on file   Social History Narrative    Not on file     Family History   Problem Relation Age of Onset    Heart disease Father          Objective:       Vitals:    07/01/20 1635 07/01/20 1638   BP: 122/84    Pulse: 76    Temp:  99.5 °F (37.5 °C)   SpO2: 96%    Weight: 122 kg (269 lb)    Height: 6' 2" (1.88 m)    PainSc:   8    PainLoc: Abdomen      Body mass index is 34.54 kg/m².    Physical Exam  Vitals signs and nursing note reviewed.   Constitutional:       Appearance: Normal appearance. He is obese.   HENT:      Head: Normocephalic.      Mouth/Throat:      Mouth: Mucous membranes are moist.      Pharynx: Oropharynx is clear.   Eyes:      Extraocular Movements: Extraocular movements intact.      Conjunctiva/sclera: Conjunctivae normal.      Pupils: Pupils are equal, round, and reactive to light.   Neck:      Musculoskeletal: Normal range of motion and neck supple.   Cardiovascular:      Rate and Rhythm: Normal rate and regular rhythm.      Pulses: Normal pulses.      Heart sounds: Normal heart sounds.   Pulmonary:      Effort: Pulmonary effort is normal.      Breath sounds: Normal breath sounds.   Abdominal:      General: Abdomen is flat. Bowel sounds are normal. There is no distension.      Palpations: Abdomen is soft. There is no mass.      Tenderness: There is no abdominal tenderness. There is right CVA tenderness. There is no left CVA tenderness, guarding " or rebound.      Hernia: No hernia is present.      Comments: obese   Musculoskeletal: Normal range of motion.   Skin:     General: Skin is warm and dry.      Capillary Refill: Capillary refill takes less than 2 seconds.   Neurological:      General: No focal deficit present.      Mental Status: He is alert and oriented to person, place, and time.   Psychiatric:         Mood and Affect: Mood normal.         Behavior: Behavior normal.         Thought Content: Thought content normal.         Judgment: Judgment normal.       Answers for HPI/ROS submitted by the patient on 7/1/2020   Back pain  genital pain: Yes  Answers for HPI/ROS submitted by the patient on 7/1/2020   Back pain  genital pain: Yes     Assessment:       1. Acute right flank pain    2. BMI 34.0-34.9,adult    3. Obesity (BMI 30-39.9)    4. Generalized abdominal pain    5. Hepatosplenomegaly    6. Elevated liver enzymes        Plan:       Danilo Lockett was seen today for abdominal pain.    Diagnoses and all orders for this visit:    Acute right flank pain  -     CT Renal Stone Study ABD Pelvis WO; Future  -     URINALYSIS    BMI 34.0-34.9,adult  BMI reviewed    Obesity (BMI 30-39.9)  BMI reviewed.    Diet and exercise to lose weight.    Generalized abdominal pain  -     CT Renal Stone Study ABD Pelvis WO; Future  -     URINALYSIS    Hepatosplenomegaly  Elevated liver enzymes  Keep Ultrasound appt and labs per liver specialist as scheduled for tomorrow    Let's check Urinalysis and CT Renal Stone study to check for kidney stone    Continue hydration with water, avoid alcohol and sodas    Self care instructions provided in AVS    Follow up if symptoms worsen or fail to improve.

## 2020-07-02 DIAGNOSIS — N39.0 ACUTE LOWER UTI: Primary | ICD-10-CM

## 2020-07-02 RX ORDER — SULFAMETHOXAZOLE AND TRIMETHOPRIM 800; 160 MG/1; MG/1
1 TABLET ORAL 2 TIMES DAILY
Qty: 14 TABLET | Refills: 0 | Status: SHIPPED | OUTPATIENT
Start: 2020-07-02 | End: 2020-07-02 | Stop reason: SDUPTHER

## 2020-07-02 NOTE — PROGRESS NOTES
It does appear that you have a UTI. I will send antibiotics to your pharmacy on file.    Meds as prescribed.    Drink plenty of water to flush bladder.    Avoid caffeine, sugary drinks, and ETOH.    Wipe from front to back.    Do not hold bladder when needing to void for long periods.    Kelly Chavez DNP, FNP-C

## 2020-07-02 NOTE — PROGRESS NOTES
There are no kidney stones seen on your CT scan. There is fatty liver noted which you will need to complete the workup per the liver specialist who has been managing this.    Kelly Chavez DNP, FNP-C

## 2020-07-09 ENCOUNTER — TELEPHONE (OUTPATIENT)
Dept: HEPATOLOGY | Facility: CLINIC | Age: 40
End: 2020-07-09

## 2020-07-09 NOTE — TELEPHONE ENCOUNTER
----- Message from Bud Kat MA sent at 6/30/2020  4:15 PM CDT -----  Regarding: follow up with labs and ultrasound

## 2020-07-09 NOTE — TELEPHONE ENCOUNTER
Ma called pt and got him schedule for labs and a follow up in the clinic will mail out appointment reminder

## 2020-07-17 ENCOUNTER — LAB VISIT (OUTPATIENT)
Dept: LAB | Facility: HOSPITAL | Age: 40
End: 2020-07-17
Attending: INTERNAL MEDICINE
Payer: COMMERCIAL

## 2020-07-17 DIAGNOSIS — R74.8 ELEVATED LIVER ENZYMES: ICD-10-CM

## 2020-07-17 LAB
ALBUMIN SERPL BCP-MCNC: 4.4 G/DL (ref 3.5–5.2)
ALP SERPL-CCNC: 73 U/L (ref 55–135)
ALT SERPL W/O P-5'-P-CCNC: 47 U/L (ref 10–44)
ANION GAP SERPL CALC-SCNC: 7 MMOL/L (ref 8–16)
AST SERPL-CCNC: 26 U/L (ref 10–40)
BASOPHILS # BLD AUTO: 0.06 K/UL (ref 0–0.2)
BASOPHILS NFR BLD: 0.8 % (ref 0–1.9)
BILIRUB SERPL-MCNC: 0.8 MG/DL (ref 0.1–1)
BUN SERPL-MCNC: 16 MG/DL (ref 6–20)
CALCIUM SERPL-MCNC: 10.2 MG/DL (ref 8.7–10.5)
CHLORIDE SERPL-SCNC: 106 MMOL/L (ref 95–110)
CO2 SERPL-SCNC: 29 MMOL/L (ref 23–29)
CREAT SERPL-MCNC: 1.1 MG/DL (ref 0.5–1.4)
DIFFERENTIAL METHOD: ABNORMAL
EOSINOPHIL # BLD AUTO: 0.1 K/UL (ref 0–0.5)
EOSINOPHIL NFR BLD: 1.8 % (ref 0–8)
ERYTHROCYTE [DISTWIDTH] IN BLOOD BY AUTOMATED COUNT: 12.2 % (ref 11.5–14.5)
EST. GFR  (AFRICAN AMERICAN): >60 ML/MIN/1.73 M^2
EST. GFR  (NON AFRICAN AMERICAN): >60 ML/MIN/1.73 M^2
GLUCOSE SERPL-MCNC: 89 MG/DL (ref 70–110)
HCT VFR BLD AUTO: 45.6 % (ref 40–54)
HGB BLD-MCNC: 15.3 G/DL (ref 14–18)
IMM GRANULOCYTES # BLD AUTO: 0.05 K/UL (ref 0–0.04)
IMM GRANULOCYTES NFR BLD AUTO: 0.7 % (ref 0–0.5)
INR PPP: 1 (ref 0.8–1.2)
LYMPHOCYTES # BLD AUTO: 3.1 K/UL (ref 1–4.8)
LYMPHOCYTES NFR BLD: 42.7 % (ref 18–48)
MCH RBC QN AUTO: 29.8 PG (ref 27–31)
MCHC RBC AUTO-ENTMCNC: 33.6 G/DL (ref 32–36)
MCV RBC AUTO: 89 FL (ref 82–98)
MONOCYTES # BLD AUTO: 0.6 K/UL (ref 0.3–1)
MONOCYTES NFR BLD: 8 % (ref 4–15)
NEUTROPHILS # BLD AUTO: 3.3 K/UL (ref 1.8–7.7)
NEUTROPHILS NFR BLD: 46 % (ref 38–73)
NRBC BLD-RTO: 0 /100 WBC
PLATELET # BLD AUTO: 250 K/UL (ref 150–350)
PMV BLD AUTO: 10.8 FL (ref 9.2–12.9)
POTASSIUM SERPL-SCNC: 4.9 MMOL/L (ref 3.5–5.1)
PROT SERPL-MCNC: 7.7 G/DL (ref 6–8.4)
PROTHROMBIN TIME: 10.5 SEC (ref 9–12.5)
RBC # BLD AUTO: 5.13 M/UL (ref 4.6–6.2)
SODIUM SERPL-SCNC: 142 MMOL/L (ref 136–145)
WBC # BLD AUTO: 7.15 K/UL (ref 3.9–12.7)

## 2020-07-17 PROCEDURE — 85025 COMPLETE CBC W/AUTO DIFF WBC: CPT

## 2020-07-17 PROCEDURE — 80053 COMPREHEN METABOLIC PANEL: CPT

## 2020-07-17 PROCEDURE — 85610 PROTHROMBIN TIME: CPT

## 2020-07-17 PROCEDURE — 36415 COLL VENOUS BLD VENIPUNCTURE: CPT

## 2020-10-30 ENCOUNTER — PATIENT MESSAGE (OUTPATIENT)
Dept: INTERNAL MEDICINE | Facility: CLINIC | Age: 40
End: 2020-10-30

## 2020-10-30 ENCOUNTER — TELEPHONE (OUTPATIENT)
Dept: INTERNAL MEDICINE | Facility: CLINIC | Age: 40
End: 2020-10-30

## 2020-10-30 DIAGNOSIS — K12.1 ORAL ULCER: ICD-10-CM

## 2020-10-30 DIAGNOSIS — Z86.19 HISTORY OF HERPES GENITALIS: ICD-10-CM

## 2020-10-30 RX ORDER — VALACYCLOVIR HYDROCHLORIDE 500 MG/1
TABLET, FILM COATED ORAL
COMMUNITY
Start: 2019-08-21 | End: 2020-10-30

## 2020-10-30 RX ORDER — VALACYCLOVIR HYDROCHLORIDE 500 MG/1
500 TABLET, FILM COATED ORAL 2 TIMES DAILY
Qty: 14 TABLET | Refills: 0 | Status: SHIPPED | OUTPATIENT
Start: 2020-10-30 | End: 2020-11-23 | Stop reason: SDUPTHER

## 2020-10-30 NOTE — TELEPHONE ENCOUNTER
"Patient is reportedly having an acute outbreak with his HSV and no longer has any refills.   Noted the initial prescriber was MELISSA Fritz from [id number]. He reported in the message of having a refill, did not get filled due to lack of symptoms at the time.   Have Rx'd a 5 day supply (whis is used in the form of "active" outbreak tx), until he can get either into the office or a Virtual with Ms. Fritz.     SDJ  "

## 2020-11-02 RX ORDER — VALACYCLOVIR HYDROCHLORIDE 500 MG/1
500 TABLET, FILM COATED ORAL 2 TIMES DAILY
Qty: 10 TABLET | Refills: 0 | OUTPATIENT
Start: 2020-11-02 | End: 2020-11-07

## 2020-11-23 ENCOUNTER — TELEPHONE (OUTPATIENT)
Dept: INTERNAL MEDICINE | Facility: CLINIC | Age: 40
End: 2020-11-23

## 2020-11-23 ENCOUNTER — LAB VISIT (OUTPATIENT)
Dept: INTERNAL MEDICINE | Facility: CLINIC | Age: 40
End: 2020-11-23
Payer: COMMERCIAL

## 2020-11-23 ENCOUNTER — OFFICE VISIT (OUTPATIENT)
Dept: INTERNAL MEDICINE | Facility: CLINIC | Age: 40
End: 2020-11-23
Payer: COMMERCIAL

## 2020-11-23 DIAGNOSIS — R68.83 CHILLS: ICD-10-CM

## 2020-11-23 DIAGNOSIS — Z86.19 HISTORY OF HERPES GENITALIS: ICD-10-CM

## 2020-11-23 DIAGNOSIS — R11.0 NAUSEA: ICD-10-CM

## 2020-11-23 DIAGNOSIS — S69.91XA FINGER INJURY, RIGHT, INITIAL ENCOUNTER: ICD-10-CM

## 2020-11-23 DIAGNOSIS — R50.9 FEVER: ICD-10-CM

## 2020-11-23 DIAGNOSIS — R05.9 COUGH: ICD-10-CM

## 2020-11-23 DIAGNOSIS — R05.9 COUGH: Primary | ICD-10-CM

## 2020-11-23 PROCEDURE — 99214 PR OFFICE/OUTPT VISIT, EST, LEVL IV, 30-39 MIN: ICD-10-PCS | Mod: 95,,, | Performed by: PHYSICIAN ASSISTANT

## 2020-11-23 PROCEDURE — U0003 INFECTIOUS AGENT DETECTION BY NUCLEIC ACID (DNA OR RNA); SEVERE ACUTE RESPIRATORY SYNDROME CORONAVIRUS 2 (SARS-COV-2) (CORONAVIRUS DISEASE [COVID-19]), AMPLIFIED PROBE TECHNIQUE, MAKING USE OF HIGH THROUGHPUT TECHNOLOGIES AS DESCRIBED BY CMS-2020-01-R: HCPCS

## 2020-11-23 PROCEDURE — 99214 OFFICE O/P EST MOD 30 MIN: CPT | Mod: 95,,, | Performed by: PHYSICIAN ASSISTANT

## 2020-11-23 RX ORDER — VALACYCLOVIR HYDROCHLORIDE 500 MG/1
TABLET, FILM COATED ORAL
Qty: 90 TABLET | Refills: 1 | Status: SHIPPED | OUTPATIENT
Start: 2020-11-23 | End: 2021-08-23 | Stop reason: SDUPTHER

## 2020-11-23 NOTE — PROGRESS NOTES
Subjective:       Patient ID: Danilo Zambrano is a 40 y.o. male.    Chief Complaint: COVID-19 Concerns, Medication Refill, and Hand Pain    HPI     Established pt of Zheng Dobbins MD, MD (Inactive) (new to me)    The patient location is: Home  The chief complaint leading to consultation is: COVID-19 Concerns, Medication Refill, and Hand Pain  Visit type: audiovisual        Notes:     For the past week having subjective fever, congestion, cough, nausea and chills. Symptoms have waxed and waned. No known COVID exposure. No smell or taste changes. Taking OTC mucinex which helps. Increasing his fluid intake.     Also notes right index finger pain for the past 4 weeks, Onset after an injury, hyperextended finger trying to kill a cruz, initially had swelling and bruising which has since resolved but still having difficulty with ROM and stiffness (flexion).     Lastly request refill of Valtrex, takes as need for herpes outbreak about once every few months.     Past medical history:I have reviewed and confirmed the past medical history in the chart.  Medications: reviewed medication list in the chart  Allergies: reviewed allergy section in the chart          Answers for HPI/ROS submitted by the patient on 11/23/2020   Fever  Chronicity: new  Onset: in the past 7 days  Frequency: every several days  Progression since onset: gradually worsening  Max temp prior to arrival: unmeasured  chest pain: Yes  congestion: Yes  headaches: Yes  nausea: Yes  sleepiness: Yes  sore throat: Yes  Treatments tried: nothing      Review of Systems   Constitutional: Positive for fever.   HENT: Positive for nasal congestion and sore throat.    Cardiovascular: Positive for chest pain (aches/congestion).   Gastrointestinal: Positive for nausea.   Neurological: Positive for headaches.         Objective:       Physical Exam  Vitals signs reviewed.   Constitutional:       General: He is not in acute distress.     Appearance: Normal  appearance. He is well-developed. He is not ill-appearing.   Pulmonary:      Effort: Pulmonary effort is normal. No respiratory distress.      Comments: Speaking comfortably in complete sentences  Musculoskeletal:      Right hand: He exhibits decreased range of motion (index finger/flexion, no locking noted).   Neurological:      Mental Status: He is alert.         Assessment:       1. Cough    2. Nausea    3. Chills    4. Fever    5. Finger injury, right, initial encounter    6. History of herpes genitalis        Plan:         Danilo Lockett was seen today for covid-19 concerns, medication refill and hand pain.    Diagnoses and all orders for this visit:    Cough  Nausea  Chills  Fever (subjective)  -     COVID-19 Routine Screening; Future ([facility])  -  Conservative measures for symptoms relief discussed (rest, increase fluids, tylenol/NSAIDs, prn, warm salt gargles. Okay to continue Mucinex. May try antihistamine for post nasal drip)    Finger injury, right, initial encounter  -Ortho Hand referral    History of herpes genitalis  -     valACYclovir (VALTREX) 500 MG tablet; Take one tablet by mouth twice a day for outbreak/recurrence episodes        Marina Fritz PA-C          Face to Face time with patient: 15 mins  20 minutes of total time spent on the encounter, which includes face to face time and non-face to face time preparing to see the patient (eg, review of tests), Obtaining and/or reviewing separately obtained history, Documenting clinical information in the electronic or other health record, Independently interpreting results (not separately reported) and communicating results to the patient/family/caregiver, or Care coordination (not separately reported).         Each patient to whom he or she provides medical services by telemedicine is:  (1) informed of the relationship between the physician and patient and the respective role of any other health care provider with respect to management of the  patient; and (2) notified that he or she may decline to receive medical services by telemedicine and may withdraw from such care at any time.

## 2020-11-23 NOTE — PATIENT INSTRUCTIONS
Instructions for Patients with Confirmed or Suspected COVID-19    If you are awaiting your test result, you will either be called or it will be released to the patient portal.  If you have any questions about your test, please visit [email or web address] or call our COVID-19 information line at [phone].      Instructions for non-hospitalized or discharged patients with confirmed or suspected COVID-19:       Stay home except to get medical care.    Separate yourself from other people and animals in your home.    Call ahead before visiting your doctor.    Wear a face mask.    Cover your coughs and sneezes.    Clean your hands often.    Avoid sharing personal household items.    Clean all high-touch surfaces every day.    Monitor your symptoms. Seek prompt medical attention if your illness is worsening (e.g., difficulty breathing). Before seeking care, call your healthcare provider.    If you have a medical emergency and must call 911, notify the dispatcher that you have or are being evaluated for COVID-19. If possible, put on a face mask before emergency medical services arrive.    Use the following symptom-based strategy to return to normal activity following a suspected or confirmed case of COVID-19. Continue isolation until:   o At least 3 days (72 hours) have passed since recovery defined as resolution of fever without the use of fever-reducing medications and improvement in respiratory symptoms (e.g. cough, shortness of breath), and   o At least 10 days have passed since the first positive test.       As one of the next steps, you will receive a call or text from the [place] Department of Health ([facility]) COVID-19 Tracing Team. See the contact information below so you know not to ignore the health departments call. It is important that you contact them back immediately so they can help.     Contact Tracer Number:  [phone]  Caller ID for most carriers: [place] Dept [facility]    What is  contact tracing?   Contact tracing is a process that helps identify everyone who has been in close contact with an infected person. Contact tracers let those people know they may have been exposed and guide them on next steps. Confidentiality is important for everyone; no one will be told who may have exposed them to the virus.   Your involvement is important. The more we know about where and how this virus is spreading, the better chance we have at stopping it from spreading further.  What does exposure mean?   Exposure means you have been within 6 feet for more than 15 minutes with a person who has or had COVID-19.  What kind of questions do the contact tracers ask?   A contact tracer will confirm your basic contact information including name, address, phone number, and next of kin, as well as asking about any symptoms you may have had. Theyll also ask you how you think you may have gotten sick, such as places where you may have been exposed to the virus, and people you were with. Those names will never be shared with anyone outside of that call, and will only be used to help trace and stop the spread of the virus.   I have privacy concerns. How will the state use my information?   Your privacy about your health is important. All calls are completed using call centers that use the appropriate health privacy protection measures (HIPAA compliance), meaning that your patient information is safe. No one will ever ask you any questions related to immigration status. Your health comes first.   Do I have to participate?   You do not have to participate, but we strongly encourage you to. Contact tracing can help us catch and control new outbreaks as theyre developing to keep your friends and family safe.   What if I dont hear from anyone?   If you dont receive a call within 24 hours, you can call the number above right away to inquire about your status. That line is open from 8:00 am - 8:00 p.m., 7 days a  week.  Contact tracing saves lives! Together, we have the power to beat this virus and keep our loved ones and neighbors safe.       Instructions for household members, intimate partners and caregivers in a non-healthcare setting of a patient with confirmed or suspected COVID-19:         Close contacts should monitor their health and call their healthcare provider right away if they develop symptoms suggestive of COVID-19 (e.g., fever, cough, shortness of breath).    Stay home except to get medical care. Separate yourself from other people and animals in the home.   Monitor the patients symptoms. If the patient is getting sicker, call his or her healthcare provider. If the patient has a medical emergency and you need to call 911, notify the dispatch personnel that the patient has or is being evaluated for COVID-19.    Wear a facemask when around other people such as sharing a room or vehicle and before entering a healthcare provider's office.   Cover coughs and sneezes with a tissue. Throw used tissues in a lined trash can immediately and wash hands.   Clean hands often with soap and water for at least 20 seconds or with an alcohol-based hand , rubbing hands together until they feel dry. Avoid touching your eyes, nose, and mouth with unwashed hands.   Clean all high-touch; surfaces every day, including counters, tabletops, doorknobs, bathroom fixtures, toilets, phones, keyboards, tablets, bedside tables, etc. Use a household cleaning spray or wipe according to label instructions.   Avoid sharing personal household items such as dishes, drinking glasses, cups, towels, bedding, etc. After these items are used, they should be washed thoroughly with soap and water.   Continue isolation until:   At least 3 days (72 hours) have passed since recovery defined as resolution of fever without the use of fever-reducing medications and improvement in respiratory symptoms (e.g. cough, shortness of breath),  and    At least 10 days have passed since the patients first positive test.    https://www.cdc.gov/coronavirus/2019-ncov/your-health/index.htm

## 2020-11-24 LAB — SARS-COV-2 RNA RESP QL NAA+PROBE: NOT DETECTED

## 2020-11-25 ENCOUNTER — PATIENT MESSAGE (OUTPATIENT)
Dept: INTERNAL MEDICINE | Facility: CLINIC | Age: 40
End: 2020-11-25

## 2020-11-25 RX ORDER — AZITHROMYCIN 250 MG/1
TABLET, FILM COATED ORAL
Qty: 6 TABLET | Refills: 0 | Status: SHIPPED | OUTPATIENT
Start: 2020-11-25 | End: 2020-11-26 | Stop reason: SDUPTHER

## 2020-11-26 RX ORDER — AZITHROMYCIN 250 MG/1
TABLET, FILM COATED ORAL
Qty: 6 TABLET | Refills: 0 | Status: SHIPPED | OUTPATIENT
Start: 2020-11-26 | End: 2020-12-01

## 2020-12-09 DIAGNOSIS — R52 PAIN: Primary | ICD-10-CM

## 2020-12-15 ENCOUNTER — TELEPHONE (OUTPATIENT)
Dept: ORTHOPEDICS | Facility: CLINIC | Age: 40
End: 2020-12-15

## 2020-12-15 NOTE — TELEPHONE ENCOUNTER
Call placed to patient to remind them of appointment and xrays needed beforehand. Voicemail left for patient.       Denise Oliver LPN

## 2020-12-16 ENCOUNTER — OFFICE VISIT (OUTPATIENT)
Dept: ORTHOPEDICS | Facility: CLINIC | Age: 40
End: 2020-12-16
Payer: COMMERCIAL

## 2020-12-16 ENCOUNTER — HOSPITAL ENCOUNTER (OUTPATIENT)
Dept: RADIOLOGY | Facility: OTHER | Age: 40
Discharge: HOME | End: 2020-12-16
Attending: PLASTIC SURGERY
Payer: COMMERCIAL

## 2020-12-16 VITALS — WEIGHT: 269 LBS | BODY MASS INDEX: 34.52 KG/M2 | HEIGHT: 74 IN

## 2020-12-16 DIAGNOSIS — R52 PAIN: ICD-10-CM

## 2020-12-16 DIAGNOSIS — S69.91XA FINGER INJURY, RIGHT, INITIAL ENCOUNTER: ICD-10-CM

## 2020-12-16 PROCEDURE — 73140 XR FINGER 2 OR MORE VIEWS RIGHT: ICD-10-PCS | Mod: 26,RT,, | Performed by: RADIOLOGY

## 2020-12-16 PROCEDURE — 99999 PR PBB SHADOW E&M-EST. PATIENT-LVL II: ICD-10-PCS | Mod: PBBFAC,,, | Performed by: PLASTIC SURGERY

## 2020-12-16 PROCEDURE — 99999 PR PBB SHADOW E&M-EST. PATIENT-LVL II: CPT | Mod: PBBFAC,,, | Performed by: PLASTIC SURGERY

## 2020-12-16 PROCEDURE — 73140 X-RAY EXAM OF FINGER(S): CPT | Mod: 26,RT,, | Performed by: RADIOLOGY

## 2020-12-16 PROCEDURE — 99203 OFFICE O/P NEW LOW 30 MIN: CPT | Mod: S$GLB,,, | Performed by: PLASTIC SURGERY

## 2020-12-16 PROCEDURE — 73140 X-RAY EXAM OF FINGER(S): CPT | Mod: TC,FY,RT

## 2020-12-16 PROCEDURE — 99203 PR OFFICE/OUTPT VISIT, NEW, LEVL III, 30-44 MIN: ICD-10-PCS | Mod: S$GLB,,, | Performed by: PLASTIC SURGERY

## 2020-12-16 NOTE — PROGRESS NOTES
Chief Complaint: Injury and Pain of the Right Hand      HPI:    Danilo Zambrano is a right hand dominant 40 y.o. male presenting today for a 2 month history of right index finger pain and limited range of motion.  He states that in October some time he hyperextended his right index finger while attempting to kill a cruz using a flip flop.  He states that after the injury he noted swelling and bruising on the volar aspect of the right index finger he did not seek medical attention at that time.  He did not splint the finger or raissa tape.  He states that since the injuries had some limited range of motion and feelings of fullness within the digit.  He does feel it is getting better however he felt that it was necessary to have it evaluated.  He denies any previous history of trauma and he has no other complaints.    Past Medical History:   Diagnosis Date    Genital herpes     Stable angina 07/03/2017       Past Surgical History:   Procedure Laterality Date    cardiac cath  07/03/2017    stable angina, Dr. Dobbins    NASAL SEPTUM SURGERY      TONSILLECTOMY          Family History   Problem Relation Age of Onset    Heart disease Father        Social History     Socioeconomic History    Marital status:      Spouse name: Not on file    Number of children: Not on file    Years of education: Not on file    Highest education level: Not on file   Occupational History    Not on file   Social Needs    Financial resource strain: Not hard at all    Food insecurity     Worry: Never true     Inability: Never true    Transportation needs     Medical: No     Non-medical: No   Tobacco Use    Smoking status: Never Smoker    Smokeless tobacco: Never Used   Substance and Sexual Activity    Alcohol use: No     Frequency: Monthly or less     Drinks per session: 1 or 2     Binge frequency: Never    Drug use: No    Sexual activity: Yes   Lifestyle    Physical activity     Days per week: 0 days     Minutes per  "session: 0 min    Stress: Very much   Relationships    Social connections     Talks on phone: Once a week     Gets together: Never     Attends Jainism service: Not on file     Active member of club or organization: No     Attends meetings of clubs or organizations: Never     Relationship status:    Other Topics Concern    Not on file   Social History Narrative    Not on file       Review of patient's allergies indicates:  No Known Allergies      Current Outpatient Medications:     valACYclovir (VALTREX) 500 MG tablet, Take one tablet by mouth twice a day for outbreak/recurrence episodes, Disp: 90 tablet, Rfl: 1        Review of Systems:  Constitutional: no fever or chills  ENT: no nasal congestion or sore throat  Respiratory: no cough or shortness of breath  Cardiovascular: no chest pain or palpitations  Gastrointestinal: no nausea or vomiting, PUD, GERD, NSAID intolerance  Genitourinary: no hematuria or dysuria  Integument/Breast: no rash or pruritis  Hematologic/Lymphatic: no easy bruising or lymphadenopathy  Musculoskeletal: see HPI  Neurological: no seizures or tremors  Behavioral/Psych: no auditory or visual hallucinations      Objective:      PHYSICAL EXAM:  Vitals:    12/16/20 1056   Weight: 122 kg (269 lb)   Height: 6' 2" (1.88 m)   PainSc:   4     General Appearance: WDWN, NAD  Neuro/Psych: Mood & affect appropriate  Lungs: Respirations equal and unlabored.   CV: No apparent CV dysfunction, distal pulses intact, RRR  Skin: Intact throughout  Extremities:   Right Hand Exam     Tenderness   The patient is experiencing no tenderness.     Range of Motion   Hand   MP Index: normal   PIP Index: normal   DIP Index: normal     Other   Erythema: absent  Sensation: normal  Pulse: present    Comments:  The PIP joint of the right index finger stable to hyper extension and ulnar and lateral deviation forces.              DIAGNOSTICS/IMAGING:    Radiologist's Impression:     EXAMINATION:  XR FINGER 2 OR " MORE VIEWS RIGHT     CLINICAL HISTORY:  Right index;  Pain, unspecified     TECHNIQUE:  Two more views of the right 2nd finger     COMPARISON:  None     FINDINGS:  Bony structures are intact.  No fractures are seen minimal soft tissue swelling is seen about the proximal interphalangeal joint.       Comments: I have personnaly reviewed the imaging and I agree with the above radiologist's report.  There is a little irregularity at the volar base of the middle phalanx possibly consistent with a volar plate injury.        Assessment:     Encounter Diagnosis   Name Primary?    Finger injury, right, initial encounter         Plan/Discussion:   Danlio Lockett was seen today for injury and pain.    Diagnoses and all orders for this visit:    Finger injury, right, initial encounter  -     Ambulatory referral/consult to Orthopedics      Based on today's findings the patient has full range of motion of the digit with some mild discomfort with extreme flexion and extension.  He may have suffered from S small avulsion injury of the volar plate from the middle phalanx of the right index finger.  I discussed that there are no surgical interventions knee at this time and he has excellent function of the digit which will likely continue to improve with time.  He was offered a referral to occupational therapy however declined this at this time.  I discussed that if his symptoms fail improve worsen or if you would like to pursue therapy he may return to clinic otherwise may follow up p.r.n.

## 2020-12-16 NOTE — LETTER
December 16, 2020      Marina Fritz PA-C  [address]  [facility] [address]           [place] [address], [address]  [facility] [address]  Phone: [phone]          Patient: Danilo Zambrano   MR Number: [id number]   YOB: 1980   Date of Visit: 12/16/2020       Dear Marina Fritz:    Thank you for referring Danilo Zambrano to me for evaluation. Attached you will find relevant portions of my assessment and plan of care.    If you have questions, please do not hesitate to call me. I look forward to following Danilo Zambrano along with you.    Sincerely,    El Rick Jr., MD    Enclosure  CC:  No Recipients    If you would like to receive this communication electronically, please contact [email or web address] or [phone] to request more information on [facility] access.    For providers and/or their staff who would like to refer a patient to [facility], please contact us through our one-stop-shop provider referral line, [facility], at [phone].    If you feel you have received this communication in error or would no longer like to receive these types of communications, please e-mail [email or web address]

## 2021-04-07 ENCOUNTER — OFFICE VISIT (OUTPATIENT)
Dept: URGENT CARE | Facility: CLINIC | Age: 41
End: 2021-04-07
Payer: COMMERCIAL

## 2021-04-07 VITALS
DIASTOLIC BLOOD PRESSURE: 99 MMHG | WEIGHT: 260 LBS | BODY MASS INDEX: 33.37 KG/M2 | TEMPERATURE: 99 F | HEIGHT: 74 IN | HEART RATE: 97 BPM | SYSTOLIC BLOOD PRESSURE: 149 MMHG | OXYGEN SATURATION: 95 %

## 2021-04-07 DIAGNOSIS — R05.9 COUGH: ICD-10-CM

## 2021-04-07 DIAGNOSIS — U07.1 COVID-19: Primary | ICD-10-CM

## 2021-04-07 DIAGNOSIS — U07.1 COVID-19 VIRUS DETECTED: ICD-10-CM

## 2021-04-07 LAB
CTP QC/QA: YES
SARS-COV-2 RDRP RESP QL NAA+PROBE: POSITIVE

## 2021-04-07 PROCEDURE — U0002: ICD-10-PCS | Mod: QW,S$GLB,, | Performed by: PHYSICIAN ASSISTANT

## 2021-04-07 PROCEDURE — U0002 COVID-19 LAB TEST NON-CDC: HCPCS | Mod: QW,S$GLB,, | Performed by: PHYSICIAN ASSISTANT

## 2021-04-07 PROCEDURE — 99214 OFFICE O/P EST MOD 30 MIN: CPT | Mod: S$GLB,CS,, | Performed by: PHYSICIAN ASSISTANT

## 2021-04-07 PROCEDURE — 99214 PR OFFICE/OUTPT VISIT, EST, LEVL IV, 30-39 MIN: ICD-10-PCS | Mod: S$GLB,CS,, | Performed by: PHYSICIAN ASSISTANT

## 2021-04-08 ENCOUNTER — NURSE TRIAGE (OUTPATIENT)
Dept: ADMINISTRATIVE | Facility: CLINIC | Age: 41
End: 2021-04-08

## 2021-04-09 ENCOUNTER — INFUSION (OUTPATIENT)
Dept: INFECTIOUS DISEASES | Facility: HOSPITAL | Age: 41
End: 2021-04-09
Attending: INTERNAL MEDICINE
Payer: COMMERCIAL

## 2021-04-09 VITALS
SYSTOLIC BLOOD PRESSURE: 124 MMHG | TEMPERATURE: 98 F | BODY MASS INDEX: 33.37 KG/M2 | DIASTOLIC BLOOD PRESSURE: 81 MMHG | OXYGEN SATURATION: 97 % | HEART RATE: 74 BPM | RESPIRATION RATE: 20 BRPM | WEIGHT: 260 LBS | HEIGHT: 74 IN

## 2021-04-09 DIAGNOSIS — U07.1 COVID-19: Primary | ICD-10-CM

## 2021-04-09 PROCEDURE — M0239 BAMLANIVIMAB-XXXX INFUSION: HCPCS

## 2021-04-09 PROCEDURE — 25000003 PHARM REV CODE 250

## 2021-04-09 PROCEDURE — 63600175 PHARM REV CODE 636 W HCPCS

## 2021-04-09 RX ORDER — EPINEPHRINE 0.3 MG/.3ML
0.3 INJECTION SUBCUTANEOUS
Status: DISCONTINUED | OUTPATIENT
Start: 2021-04-09 | End: 2022-05-25

## 2021-04-09 RX ORDER — ACETAMINOPHEN 325 MG/1
650 TABLET ORAL ONCE AS NEEDED
Status: DISCONTINUED | OUTPATIENT
Start: 2021-04-09 | End: 2022-05-25

## 2021-04-09 RX ORDER — SODIUM CHLORIDE 0.9 % (FLUSH) 0.9 %
10 SYRINGE (ML) INJECTION
Status: DISCONTINUED | OUTPATIENT
Start: 2021-04-09 | End: 2022-05-25

## 2021-04-09 RX ORDER — DIPHENHYDRAMINE HYDROCHLORIDE 50 MG/ML
25 INJECTION INTRAMUSCULAR; INTRAVENOUS ONCE AS NEEDED
Status: DISCONTINUED | OUTPATIENT
Start: 2021-04-09 | End: 2022-05-25

## 2021-04-09 RX ORDER — ONDANSETRON 4 MG/1
4 TABLET, ORALLY DISINTEGRATING ORAL ONCE AS NEEDED
Status: DISCONTINUED | OUTPATIENT
Start: 2021-04-09 | End: 2022-05-25

## 2021-04-09 RX ORDER — ALBUTEROL SULFATE 90 UG/1
2 AEROSOL, METERED RESPIRATORY (INHALATION)
Status: ACTIVE | OUTPATIENT
Start: 2021-04-09 | End: 2021-04-10

## 2021-04-09 RX ADMIN — SODIUM CHLORIDE 700 MG: 9 INJECTION, SOLUTION INTRAVENOUS at 12:04

## 2021-04-10 ENCOUNTER — NURSE TRIAGE (OUTPATIENT)
Dept: ADMINISTRATIVE | Facility: CLINIC | Age: 41
End: 2021-04-10

## 2021-04-15 ENCOUNTER — PATIENT MESSAGE (OUTPATIENT)
Dept: RESEARCH | Facility: HOSPITAL | Age: 41
End: 2021-04-15

## 2021-05-12 ENCOUNTER — PATIENT MESSAGE (OUTPATIENT)
Dept: INTERNAL MEDICINE | Facility: CLINIC | Age: 41
End: 2021-05-12

## 2021-05-12 DIAGNOSIS — K64.9 HEMORRHOIDS, UNSPECIFIED HEMORRHOID TYPE: Primary | ICD-10-CM

## 2021-08-06 ENCOUNTER — IMMUNIZATION (OUTPATIENT)
Dept: INTERNAL MEDICINE | Facility: CLINIC | Age: 41
End: 2021-08-06
Payer: COMMERCIAL

## 2021-08-06 DIAGNOSIS — Z23 NEED FOR VACCINATION: Primary | ICD-10-CM

## 2021-08-06 PROCEDURE — 91301 COVID-19, MRNA, LNP-S, PF, 100 MCG/0.5 ML DOSE VACCINE: CPT | Mod: S$GLB,,, | Performed by: INTERNAL MEDICINE

## 2021-08-06 PROCEDURE — 0012A COVID-19, MRNA, LNP-S, PF, 100 MCG/0.5 ML DOSE VACCINE: ICD-10-PCS | Mod: CV19,S$GLB,, | Performed by: INTERNAL MEDICINE

## 2021-08-06 PROCEDURE — 0012A COVID-19, MRNA, LNP-S, PF, 100 MCG/0.5 ML DOSE VACCINE: CPT | Mod: CV19,S$GLB,, | Performed by: INTERNAL MEDICINE

## 2021-08-06 PROCEDURE — 91301 COVID-19, MRNA, LNP-S, PF, 100 MCG/0.5 ML DOSE VACCINE: ICD-10-PCS | Mod: S$GLB,,, | Performed by: INTERNAL MEDICINE

## 2021-08-25 ENCOUNTER — PATIENT MESSAGE (OUTPATIENT)
Dept: INTERNAL MEDICINE | Facility: CLINIC | Age: 41
End: 2021-08-25

## 2021-10-04 ENCOUNTER — PATIENT MESSAGE (OUTPATIENT)
Dept: INTERNAL MEDICINE | Facility: CLINIC | Age: 41
End: 2021-10-04

## 2021-11-15 ENCOUNTER — OFFICE VISIT (OUTPATIENT)
Dept: INTERNAL MEDICINE | Facility: CLINIC | Age: 41
End: 2021-11-15
Payer: COMMERCIAL

## 2021-11-15 ENCOUNTER — IMMUNIZATION (OUTPATIENT)
Dept: INTERNAL MEDICINE | Facility: CLINIC | Age: 41
End: 2021-11-15
Payer: COMMERCIAL

## 2021-11-15 VITALS
SYSTOLIC BLOOD PRESSURE: 122 MMHG | OXYGEN SATURATION: 98 % | BODY MASS INDEX: 37.35 KG/M2 | HEIGHT: 74 IN | WEIGHT: 291 LBS | HEART RATE: 58 BPM | DIASTOLIC BLOOD PRESSURE: 84 MMHG

## 2021-11-15 DIAGNOSIS — Z86.19 HISTORY OF HERPES GENITALIS: ICD-10-CM

## 2021-11-15 DIAGNOSIS — K60.2 ANAL FISSURE: ICD-10-CM

## 2021-11-15 DIAGNOSIS — E66.1 CLASS 2 DRUG-INDUCED OBESITY WITHOUT SERIOUS COMORBIDITY WITH BODY MASS INDEX (BMI) OF 37.0 TO 37.9 IN ADULT: ICD-10-CM

## 2021-11-15 DIAGNOSIS — Z82.49 FAMILY HISTORY OF CARDIOVASCULAR DISEASE: ICD-10-CM

## 2021-11-15 DIAGNOSIS — Z00.00 ANNUAL PHYSICAL EXAM: Primary | ICD-10-CM

## 2021-11-15 PROCEDURE — 90471 IMMUNIZATION ADMIN: CPT | Mod: S$GLB,,, | Performed by: INTERNAL MEDICINE

## 2021-11-15 PROCEDURE — 90686 IIV4 VACC NO PRSV 0.5 ML IM: CPT | Mod: S$GLB,,, | Performed by: INTERNAL MEDICINE

## 2021-11-15 PROCEDURE — 99999 PR PBB SHADOW E&M-EST. PATIENT-LVL V: CPT | Mod: PBBFAC,,, | Performed by: PHYSICIAN ASSISTANT

## 2021-11-15 PROCEDURE — 99999 PR PBB SHADOW E&M-EST. PATIENT-LVL V: ICD-10-PCS | Mod: PBBFAC,,, | Performed by: PHYSICIAN ASSISTANT

## 2021-11-15 PROCEDURE — 99395 PREV VISIT EST AGE 18-39: CPT | Mod: 25,S$GLB,, | Performed by: PHYSICIAN ASSISTANT

## 2021-11-15 PROCEDURE — 90471 FLU VACCINE (QUAD) GREATER THAN OR EQUAL TO 3YO PRESERVATIVE FREE IM: ICD-10-PCS | Mod: S$GLB,,, | Performed by: INTERNAL MEDICINE

## 2021-11-15 PROCEDURE — 99395 PR PREVENTIVE VISIT,EST,18-39: ICD-10-PCS | Mod: 25,S$GLB,, | Performed by: PHYSICIAN ASSISTANT

## 2021-11-15 PROCEDURE — 90686 FLU VACCINE (QUAD) GREATER THAN OR EQUAL TO 3YO PRESERVATIVE FREE IM: ICD-10-PCS | Mod: S$GLB,,, | Performed by: INTERNAL MEDICINE

## 2021-11-15 RX ORDER — VALACYCLOVIR HYDROCHLORIDE 500 MG/1
TABLET, FILM COATED ORAL
Qty: 90 TABLET | Refills: 3 | Status: SHIPPED | OUTPATIENT
Start: 2021-11-15 | End: 2022-05-02

## 2021-11-17 ENCOUNTER — TELEPHONE (OUTPATIENT)
Dept: BARIATRICS | Facility: CLINIC | Age: 41
End: 2021-11-17
Payer: COMMERCIAL

## 2021-11-18 ENCOUNTER — LAB VISIT (OUTPATIENT)
Dept: LAB | Facility: HOSPITAL | Age: 41
End: 2021-11-18
Payer: COMMERCIAL

## 2021-11-18 DIAGNOSIS — Z00.00 ANNUAL PHYSICAL EXAM: ICD-10-CM

## 2021-11-18 DIAGNOSIS — E66.1 CLASS 2 DRUG-INDUCED OBESITY WITHOUT SERIOUS COMORBIDITY WITH BODY MASS INDEX (BMI) OF 37.0 TO 37.9 IN ADULT: ICD-10-CM

## 2021-11-18 LAB
ALBUMIN SERPL BCP-MCNC: 4.2 G/DL (ref 3.5–5.2)
ALP SERPL-CCNC: 79 U/L (ref 55–135)
ALT SERPL W/O P-5'-P-CCNC: 107 U/L (ref 10–44)
ANION GAP SERPL CALC-SCNC: 9 MMOL/L (ref 8–16)
AST SERPL-CCNC: 64 U/L (ref 10–40)
BASOPHILS # BLD AUTO: 0.06 K/UL (ref 0–0.2)
BASOPHILS NFR BLD: 0.8 % (ref 0–1.9)
BILIRUB SERPL-MCNC: 1.2 MG/DL (ref 0.1–1)
BUN SERPL-MCNC: 16 MG/DL (ref 6–20)
CALCIUM SERPL-MCNC: 9.4 MG/DL (ref 8.7–10.5)
CHLORIDE SERPL-SCNC: 106 MMOL/L (ref 95–110)
CHOLEST SERPL-MCNC: 184 MG/DL (ref 120–199)
CHOLEST/HDLC SERPL: 4.4 {RATIO} (ref 2–5)
CO2 SERPL-SCNC: 28 MMOL/L (ref 23–29)
CREAT SERPL-MCNC: 0.9 MG/DL (ref 0.5–1.4)
DIFFERENTIAL METHOD: NORMAL
EOSINOPHIL # BLD AUTO: 0.1 K/UL (ref 0–0.5)
EOSINOPHIL NFR BLD: 1.6 % (ref 0–8)
ERYTHROCYTE [DISTWIDTH] IN BLOOD BY AUTOMATED COUNT: 12.9 % (ref 11.5–14.5)
EST. GFR  (AFRICAN AMERICAN): >60 ML/MIN/1.73 M^2
EST. GFR  (NON AFRICAN AMERICAN): >60 ML/MIN/1.73 M^2
ESTIMATED AVG GLUCOSE: 111 MG/DL (ref 68–131)
GLUCOSE SERPL-MCNC: 86 MG/DL (ref 70–110)
HBA1C MFR BLD: 5.5 % (ref 4–5.6)
HCT VFR BLD AUTO: 44.6 % (ref 40–54)
HDLC SERPL-MCNC: 42 MG/DL (ref 40–75)
HDLC SERPL: 22.8 % (ref 20–50)
HGB BLD-MCNC: 15 G/DL (ref 14–18)
IMM GRANULOCYTES # BLD AUTO: 0.03 K/UL (ref 0–0.04)
IMM GRANULOCYTES NFR BLD AUTO: 0.4 % (ref 0–0.5)
LDLC SERPL CALC-MCNC: 115.4 MG/DL (ref 63–159)
LYMPHOCYTES # BLD AUTO: 2.4 K/UL (ref 1–4.8)
LYMPHOCYTES NFR BLD: 32.4 % (ref 18–48)
MCH RBC QN AUTO: 29.6 PG (ref 27–31)
MCHC RBC AUTO-ENTMCNC: 33.6 G/DL (ref 32–36)
MCV RBC AUTO: 88 FL (ref 82–98)
MONOCYTES # BLD AUTO: 0.6 K/UL (ref 0.3–1)
MONOCYTES NFR BLD: 7.6 % (ref 4–15)
NEUTROPHILS # BLD AUTO: 4.2 K/UL (ref 1.8–7.7)
NEUTROPHILS NFR BLD: 57.2 % (ref 38–73)
NONHDLC SERPL-MCNC: 142 MG/DL
NRBC BLD-RTO: 0 /100 WBC
PLATELET # BLD AUTO: 206 K/UL (ref 150–450)
PMV BLD AUTO: 10.5 FL (ref 9.2–12.9)
POTASSIUM SERPL-SCNC: 4.7 MMOL/L (ref 3.5–5.1)
PROT SERPL-MCNC: 7 G/DL (ref 6–8.4)
RBC # BLD AUTO: 5.06 M/UL (ref 4.6–6.2)
SODIUM SERPL-SCNC: 143 MMOL/L (ref 136–145)
TRIGL SERPL-MCNC: 133 MG/DL (ref 30–150)
TSH SERPL DL<=0.005 MIU/L-ACNC: 1.72 UIU/ML (ref 0.4–4)
WBC # BLD AUTO: 7.4 K/UL (ref 3.9–12.7)

## 2021-11-18 PROCEDURE — 85025 COMPLETE CBC W/AUTO DIFF WBC: CPT | Performed by: PHYSICIAN ASSISTANT

## 2021-11-18 PROCEDURE — 80061 LIPID PANEL: CPT | Performed by: PHYSICIAN ASSISTANT

## 2021-11-18 PROCEDURE — 83036 HEMOGLOBIN GLYCOSYLATED A1C: CPT | Performed by: PHYSICIAN ASSISTANT

## 2021-11-18 PROCEDURE — 36415 COLL VENOUS BLD VENIPUNCTURE: CPT | Performed by: PHYSICIAN ASSISTANT

## 2021-11-18 PROCEDURE — 84443 ASSAY THYROID STIM HORMONE: CPT | Performed by: PHYSICIAN ASSISTANT

## 2021-11-18 PROCEDURE — 80053 COMPREHEN METABOLIC PANEL: CPT | Performed by: PHYSICIAN ASSISTANT

## 2021-11-19 DIAGNOSIS — R79.89 ELEVATED LFTS: Primary | ICD-10-CM

## 2021-11-19 DIAGNOSIS — K76.0 HEPATIC STEATOSIS: ICD-10-CM

## 2021-11-20 ENCOUNTER — TELEPHONE (OUTPATIENT)
Dept: INTERNAL MEDICINE | Facility: CLINIC | Age: 41
End: 2021-11-20
Payer: COMMERCIAL

## 2021-12-10 ENCOUNTER — TELEPHONE (OUTPATIENT)
Dept: SURGERY | Facility: CLINIC | Age: 41
End: 2021-12-10
Payer: COMMERCIAL

## 2022-01-26 ENCOUNTER — TELEPHONE (OUTPATIENT)
Dept: BARIATRICS | Facility: CLINIC | Age: 42
End: 2022-01-26
Payer: COMMERCIAL

## 2022-02-22 ENCOUNTER — PATIENT MESSAGE (OUTPATIENT)
Dept: RESEARCH | Facility: HOSPITAL | Age: 42
End: 2022-02-22
Payer: COMMERCIAL

## 2022-03-08 ENCOUNTER — IMMUNIZATION (OUTPATIENT)
Dept: PHARMACY | Facility: CLINIC | Age: 42
End: 2022-03-08
Payer: COMMERCIAL

## 2022-03-08 DIAGNOSIS — Z23 NEED FOR VACCINATION: Primary | ICD-10-CM

## 2022-03-28 ENCOUNTER — TELEPHONE (OUTPATIENT)
Dept: DERMATOLOGY | Facility: CLINIC | Age: 42
End: 2022-03-28
Payer: COMMERCIAL

## 2022-03-28 NOTE — TELEPHONE ENCOUNTER
Spoke with pt and pt stated that he has a blistering rash on his feet that started a week ago and he feels it is getting worse since Friday. I offered pt an appointment to be seen on April 4th at the [facility] location and pt accepted but stated that it is to far away. Pt stated that he concerned that he may have blood clot which is causing his swelling. I explained to the pt that if he feels that it is a blood clot then I would suggest going to the ER to see if that would be the issue. Pt verbalized understanding and confirmed appointment for 4/4.

## 2022-03-28 NOTE — TELEPHONE ENCOUNTER
----- Message from Adriane Saenz sent at 3/28/2022 12:59 PM CDT -----  Contact: Via Portal  Patient has requested an appointment using the portal. Please contact them to assist further.    Patient will be a new patient and he is asking for same day appointment. I know Dermatology is booked out until summer. Please assist with patient. Thank you.         Appointment Request From: Danilo Zambrano    With Provider: Dermatologist    Preferred Date Range: 3/28/2022 - 3/28/2022    Preferred Times: Any Time    Reason for visit: Urgent - reaction    Comments:  Redness and swelling to reduce

## 2022-05-25 ENCOUNTER — LAB VISIT (OUTPATIENT)
Dept: LAB | Facility: HOSPITAL | Age: 42
End: 2022-05-25
Payer: COMMERCIAL

## 2022-05-25 ENCOUNTER — OFFICE VISIT (OUTPATIENT)
Dept: INTERNAL MEDICINE | Facility: CLINIC | Age: 42
End: 2022-05-25
Payer: COMMERCIAL

## 2022-05-25 VITALS
BODY MASS INDEX: 36.92 KG/M2 | WEIGHT: 287.69 LBS | RESPIRATION RATE: 16 BRPM | HEART RATE: 67 BPM | DIASTOLIC BLOOD PRESSURE: 80 MMHG | HEIGHT: 74 IN | SYSTOLIC BLOOD PRESSURE: 110 MMHG | OXYGEN SATURATION: 95 %

## 2022-05-25 DIAGNOSIS — R79.89 ELEVATED LFTS: ICD-10-CM

## 2022-05-25 DIAGNOSIS — F43.9 SITUATIONAL STRESS: ICD-10-CM

## 2022-05-25 DIAGNOSIS — Z86.19 HISTORY OF HERPES GENITALIS: ICD-10-CM

## 2022-05-25 DIAGNOSIS — R79.89 ELEVATED LFTS: Primary | ICD-10-CM

## 2022-05-25 DIAGNOSIS — E66.09 CLASS 2 OBESITY DUE TO EXCESS CALORIES WITHOUT SERIOUS COMORBIDITY WITH BODY MASS INDEX (BMI) OF 36.0 TO 36.9 IN ADULT: ICD-10-CM

## 2022-05-25 LAB
ALBUMIN SERPL BCP-MCNC: 4.1 G/DL (ref 3.5–5.2)
ALP SERPL-CCNC: 78 U/L (ref 55–135)
ALT SERPL W/O P-5'-P-CCNC: 50 U/L (ref 10–44)
AST SERPL-CCNC: 32 U/L (ref 10–40)
BILIRUB DIRECT SERPL-MCNC: 0.4 MG/DL (ref 0.1–0.3)
BILIRUB SERPL-MCNC: 1.1 MG/DL (ref 0.1–1)
PROT SERPL-MCNC: 7 G/DL (ref 6–8.4)

## 2022-05-25 PROCEDURE — 99214 OFFICE O/P EST MOD 30 MIN: CPT | Mod: S$GLB,,, | Performed by: PHYSICIAN ASSISTANT

## 2022-05-25 PROCEDURE — 80076 HEPATIC FUNCTION PANEL: CPT | Performed by: PHYSICIAN ASSISTANT

## 2022-05-25 PROCEDURE — 99214 PR OFFICE/OUTPT VISIT, EST, LEVL IV, 30-39 MIN: ICD-10-PCS | Mod: S$GLB,,, | Performed by: PHYSICIAN ASSISTANT

## 2022-05-25 PROCEDURE — 36415 COLL VENOUS BLD VENIPUNCTURE: CPT | Performed by: PHYSICIAN ASSISTANT

## 2022-05-25 PROCEDURE — 99999 PR PBB SHADOW E&M-EST. PATIENT-LVL IV: ICD-10-PCS | Mod: PBBFAC,,, | Performed by: PHYSICIAN ASSISTANT

## 2022-05-25 PROCEDURE — 99999 PR PBB SHADOW E&M-EST. PATIENT-LVL IV: CPT | Mod: PBBFAC,,, | Performed by: PHYSICIAN ASSISTANT

## 2022-05-25 RX ORDER — VALACYCLOVIR HYDROCHLORIDE 500 MG/1
TABLET, FILM COATED ORAL
Qty: 90 TABLET | Refills: 3 | Status: SHIPPED | OUTPATIENT
Start: 2022-05-25 | End: 2023-05-12 | Stop reason: SDUPTHER

## 2022-05-25 NOTE — PROGRESS NOTES
Subjective:       Patient ID: Danilo Zambrano is a 42 y.o. male.    Chief Complaint: Medication Refill and Referral    HPI     Established pt of Zheng Dobbins MD, MD (Inactive)     Here for med refill of Valtrex.     Needs to repeat liver lab.     He also notes concerns of trouble scheduling referrals (cardiology and colon rectal clinic) placed at last visit.     From visit 11/15/2021  Request a referral to see Cardiology and possible stress test. Notes hx of heart disease several males in his family (father, uncle grandfather all around the age 40). Had a left heart cath in 2017 with clean coronaries. He denies recent CP. occ gallo but attributes to his weigh and deconditioning, has gained over 30lbs over the past 6 months.     He notes a hx of anal fissure and hemorrhoids. Had a surgery for fissures several years ago. occ has leakage      Has appt with Bariatric in July, struggling to lost weight, more sedentary, working from home, poor food choices, caring for his 15m child. Notes increase stress. We discussed therapy/counseling and/or psychiatry referral pt declines at this time.     Past Medical History:   Diagnosis Date    Genital herpes     Stable angina 07/03/2017     Social History     Tobacco Use    Smoking status: Never Smoker    Smokeless tobacco: Never Used   Substance Use Topics    Alcohol use: No    Drug use: No     Review of patient's allergies indicates:  No Known Allergies      Review of Systems    Answers for HPI/ROS submitted by the patient on 5/25/2022  activity change: No  unexpected weight change: Yes  neck pain: No  hearing loss: No  rhinorrhea: No  trouble swallowing: No  eye discharge: No  visual disturbance: No  chest tightness: No  wheezing: No  chest pain: No  palpitations: Yes  blood in stool: No  constipation: No  vomiting: No  diarrhea: No  polydipsia: No  polyuria: No  difficulty urinating: No  urgency: No  hematuria: No  joint swelling: No  arthralgias: No  headaches:  "No  weakness: No  confusion: No  dysphoric mood: No  Objective: /80 (BP Location: Right arm, Patient Position: Sitting, BP Method: Large (Manual))   Pulse 67   Resp 16   Ht 6' 2" (1.88 m)   Wt 130.5 kg (287 lb 11.2 oz)   SpO2 95%   BMI 36.94 kg/m²         Physical Exam  Vitals reviewed.   Constitutional:       General: He is not in acute distress.     Appearance: He is well-developed.   HENT:      Head: Normocephalic and atraumatic.   Cardiovascular:      Rate and Rhythm: Normal rate and regular rhythm.      Heart sounds: No murmur heard.  Pulmonary:      Effort: Pulmonary effort is normal.      Breath sounds: Normal breath sounds. No wheezing or rales.   Abdominal:      General: Bowel sounds are normal.      Palpations: Abdomen is soft.      Tenderness: There is no abdominal tenderness.   Skin:     General: Skin is warm and dry.      Findings: No rash.   Neurological:      Mental Status: He is alert.         Assessment:       Problem List Items Addressed This Visit    None     Visit Diagnoses     Elevated LFTs    -  Primary    Relevant Orders    Hepatic Function Panel    History of herpes genitalis        Relevant Medications    valACYclovir (VALTREX) 500 MG tablet    Situational stress        Class 2 obesity due to excess calories without serious comorbidity with body mass index (BMI) of 36.0 to 36.9 in adult              Plan:         Danilo Lockett was seen today for medication refill and referral.    Diagnoses and all orders for this visit:    Elevated LFTs  -     Hepatic Function Panel; Future    History of herpes genitalis  -     valACYclovir (VALTREX) 500 MG tablet; TAKE 1 TABLET BY MOUTH TWICE DAILY FOR OUTBREAK/RECURRENCE EPISODES    Situational stress  discussed stress relieving strategies  Declines referrals at this time.     Class 2 obesity due to excess calories without serious comorbidity with body mass index (BMI) of 36.0 to 36.9 in adult  BMI and lifestyle mods reviewed  Keep upcoming " bariatric clinic appt.     Assist pt with scheduling referral via [facility]      Future Appointments   Date Time Provider Department Center   5/27/2022 10:30 AM Marvin Matthews MD [facility] CARDIO Danilo slime   5/27/2022  3:20 PM HARJIT Hernadez MD [facility] COLON Danilo slime   7/25/2022  9:30 AM [facility] BARIATRIC MED WT LOSS FIN COORD [facility] BARIAT Danilo CHRSITOPHER RowanC

## 2022-05-26 ENCOUNTER — TELEPHONE (OUTPATIENT)
Dept: SURGERY | Facility: CLINIC | Age: 42
End: 2022-05-26
Payer: COMMERCIAL

## 2022-05-26 DIAGNOSIS — R00.2 PALPITATIONS: Primary | ICD-10-CM

## 2022-05-26 NOTE — TELEPHONE ENCOUNTER
Spoke to patient confirming tomorrows apt time and location. Patient stated that coming in at 1 would be better for him. Told patient he could check in for his apt at that time.

## 2022-06-13 ENCOUNTER — TELEPHONE (OUTPATIENT)
Dept: SURGERY | Facility: CLINIC | Age: 42
End: 2022-06-13
Payer: COMMERCIAL

## 2022-06-24 ENCOUNTER — HOSPITAL ENCOUNTER (OUTPATIENT)
Dept: CARDIOLOGY | Facility: CLINIC | Age: 42
Discharge: HOME | End: 2022-06-24
Payer: COMMERCIAL

## 2022-06-24 ENCOUNTER — OFFICE VISIT (OUTPATIENT)
Dept: CARDIOLOGY | Facility: CLINIC | Age: 42
End: 2022-06-24
Payer: COMMERCIAL

## 2022-06-24 VITALS
DIASTOLIC BLOOD PRESSURE: 82 MMHG | WEIGHT: 295.19 LBS | SYSTOLIC BLOOD PRESSURE: 121 MMHG | BODY MASS INDEX: 37.88 KG/M2 | HEIGHT: 74 IN | HEART RATE: 68 BPM | OXYGEN SATURATION: 95 %

## 2022-06-24 DIAGNOSIS — R00.2 PALPITATIONS: ICD-10-CM

## 2022-06-24 DIAGNOSIS — Z82.49 FAMILY HISTORY OF PREMATURE CORONARY ARTERY DISEASE: Primary | ICD-10-CM

## 2022-06-24 PROCEDURE — 99204 PR OFFICE/OUTPT VISIT, NEW, LEVL IV, 45-59 MIN: ICD-10-PCS | Mod: S$GLB,,, | Performed by: INTERNAL MEDICINE

## 2022-06-24 PROCEDURE — 99999 PR PBB SHADOW E&M-EST. PATIENT-LVL IV: CPT | Mod: PBBFAC,,, | Performed by: INTERNAL MEDICINE

## 2022-06-24 PROCEDURE — 93010 EKG 12-LEAD: ICD-10-PCS | Mod: S$GLB,,, | Performed by: INTERNAL MEDICINE

## 2022-06-24 PROCEDURE — 99204 OFFICE O/P NEW MOD 45 MIN: CPT | Mod: S$GLB,,, | Performed by: INTERNAL MEDICINE

## 2022-06-24 PROCEDURE — 93010 ELECTROCARDIOGRAM REPORT: CPT | Mod: S$GLB,,, | Performed by: INTERNAL MEDICINE

## 2022-06-24 PROCEDURE — 93005 EKG 12-LEAD: ICD-10-PCS | Mod: S$GLB,,, | Performed by: INTERNAL MEDICINE

## 2022-06-24 PROCEDURE — 93005 ELECTROCARDIOGRAM TRACING: CPT | Mod: S$GLB,,, | Performed by: INTERNAL MEDICINE

## 2022-06-24 PROCEDURE — 99999 PR PBB SHADOW E&M-EST. PATIENT-LVL IV: ICD-10-PCS | Mod: PBBFAC,,, | Performed by: INTERNAL MEDICINE

## 2022-06-24 NOTE — PATIENT INSTRUCTIONS
"I strongly encourage you to adopt a Plant Based, whole food (unprocessed) dietary strategy.  I recommend that you watch the movie "Melon #usemelon Over KnPure Elegance TV" and "Game Changers" as well as visit www.[employer] for additional resources.  Also, I advise you to read the book "Prevent and Reverse Heart Disease" by Dr. Mendez for specific instructions and recipes.     "

## 2022-06-24 NOTE — PROGRESS NOTES
"Subjective:   Patient ID:  Danilo Zambrano is a 42 y.o. male who presents for follow-up of [facility]      Assessment/Plan:     1. Family history of premature coronary artery disease - father without risk factors @42.  Check Cor Ca score - start statin if Non-zero.      We had a lengthy discussion (>20 minutes) about nutrition and diet as it pertains to the pathophysiology of CAD and CAD risk factors.  I have strongly encouraged patient to adopt a Plant Based, whole food (unprocessed) dietary strategy.  I have recommended to watch the movie "[place] Over KnMapMyID" and "Game Changers" as well as visit www.[employer] for additional resources.  Also, I advised patient to read the book "Prevent and Reverse Heart Disease" by Dr. Mendez for specific instructions and recipes.            Orders Placed This Encounter   Procedures    CT Cardiac Scoring           ___________________________________________________________________________________________    HPI:   Pt is here for primary prevention. His father had clinical CAD discovered at age 40 or 41.  Pt states that father had no risk factors although lipids are unknown.  He has gained weight during COVID and was plant based prior to PATRICE.  He denies any exertional chest discomfort, exertional dyspnea, orthopnea, PND, palpitations, TIA's, syncope or presyncope  In 2018, he had a normal angiogram to evaluated CP.  ECG is normal         Vitals:    06/24/22 0843   BP: 121/82   BP Location: Left arm   Patient Position: Sitting   BP Method: Large (Automatic)   Pulse: 68   SpO2: 95%   Weight: 133.9 kg (295 lb 3.1 oz)   Height: 6' 2" (1.88 m)     Body mass index is 37.9 kg/m².  CrCl cannot be calculated (Patient's most recent lab result is older than the maximum 7 days allowed.).    Lab Results   Component Value Date     11/18/2021    K 4.7 11/18/2021     11/18/2021    CO2 28 11/18/2021    BUN 16 11/18/2021    CREATININE 0.9 11/18/2021    GLU 86 " 11/18/2021    HGBA1C 5.5 11/18/2021    AST 32 05/25/2022    ALT 50 (H) 05/25/2022    ALBUMIN 4.1 05/25/2022    PROT 7.0 05/25/2022    BILITOT 1.1 (H) 05/25/2022    WBC 7.40 11/18/2021    HGB 15.0 11/18/2021    HCT 44.6 11/18/2021    MCV 88 11/18/2021     11/18/2021    INR 1.0 07/17/2020    TSH 1.716 11/18/2021    CHOL 184 11/18/2021    HDL 42 11/18/2021    LDLCALC 115.4 11/18/2021    TRIG 133 11/18/2021       Current Outpatient Medications   Medication Sig    valACYclovir (VALTREX) 500 MG tablet TAKE 1 TABLET BY MOUTH TWICE DAILY FOR OUTBREAK/RECURRENCE EPISODES     No current facility-administered medications for this visit.       Review of Systems   Constitutional: Positive for weight gain. Negative for decreased appetite, malaise/fatigue and weight loss.   Eyes: Negative for visual disturbance.   Cardiovascular: Negative for chest pain, claudication, dyspnea on exertion, irregular heartbeat, orthopnea, palpitations, paroxysmal nocturnal dyspnea and syncope.   Respiratory: Negative for cough, shortness of breath and snoring.    Skin: Negative for rash.   Musculoskeletal: Negative for arthritis, muscle cramps, muscle weakness and myalgias.   Gastrointestinal: Negative for abdominal pain, anorexia, change in bowel habit and nausea.   Genitourinary: Negative for dysuria and frequency.   Neurological: Negative for excessive daytime sleepiness, dizziness, headaches, loss of balance, numbness and weakness.   Psychiatric/Behavioral: Negative for depression.       Objective:   Physical Exam  Constitutional:       Appearance: He is well-developed.   HENT:      Head: Normocephalic and atraumatic.   Cardiovascular:      Rate and Rhythm: Normal rate and regular rhythm.      Pulses: Intact distal pulses.      Heart sounds: Normal heart sounds. No murmur heard.    No friction rub. No gallop.   Pulmonary:      Effort: Pulmonary effort is normal.      Breath sounds: Normal breath sounds.   Neurological:      Mental  Status: He is alert and oriented to person, place, and time.   Psychiatric:         Behavior: Behavior normal.         Thought Content: Thought content normal.         Judgment: Judgment normal.

## 2022-06-30 ENCOUNTER — HOSPITAL ENCOUNTER (OUTPATIENT)
Dept: RADIOLOGY | Facility: HOSPITAL | Age: 42
Discharge: HOME | End: 2022-06-30
Attending: INTERNAL MEDICINE
Payer: COMMERCIAL

## 2022-06-30 ENCOUNTER — PATIENT MESSAGE (OUTPATIENT)
Dept: CARDIOLOGY | Facility: CLINIC | Age: 42
End: 2022-06-30
Payer: COMMERCIAL

## 2022-06-30 DIAGNOSIS — Z82.49 FAMILY HISTORY OF PREMATURE CORONARY ARTERY DISEASE: ICD-10-CM

## 2022-06-30 PROCEDURE — 75571 CT HRT W/O DYE W/CA TEST: CPT | Mod: TC

## 2022-06-30 PROCEDURE — 75571 CT CALCIUM SCORING CARDIAC: ICD-10-PCS | Mod: 26,,, | Performed by: RADIOLOGY

## 2022-06-30 PROCEDURE — 75571 CT HRT W/O DYE W/CA TEST: CPT | Mod: 26,,, | Performed by: RADIOLOGY

## 2022-07-01 DIAGNOSIS — R93.1 AGATSTON CORONARY ARTERY CALCIUM SCORE LESS THAN 100: ICD-10-CM

## 2022-07-01 RX ORDER — ROSUVASTATIN CALCIUM 10 MG/1
10 TABLET, COATED ORAL DAILY
Qty: 90 TABLET | Refills: 3 | Status: SHIPPED | OUTPATIENT
Start: 2022-07-01 | End: 2023-09-19 | Stop reason: SDUPTHER

## 2022-07-10 ENCOUNTER — PATIENT MESSAGE (OUTPATIENT)
Dept: SURGERY | Facility: CLINIC | Age: 42
End: 2022-07-10
Payer: COMMERCIAL

## 2022-07-11 ENCOUNTER — OFFICE VISIT (OUTPATIENT)
Dept: SURGERY | Facility: CLINIC | Age: 42
End: 2022-07-11
Payer: COMMERCIAL

## 2022-07-11 VITALS
BODY MASS INDEX: 36.58 KG/M2 | HEART RATE: 86 BPM | HEIGHT: 74 IN | SYSTOLIC BLOOD PRESSURE: 134 MMHG | WEIGHT: 285.06 LBS | DIASTOLIC BLOOD PRESSURE: 88 MMHG

## 2022-07-11 DIAGNOSIS — S30.817A PERIANAL EXCORIATION: Primary | ICD-10-CM

## 2022-07-11 PROCEDURE — 99203 PR OFFICE/OUTPT VISIT, NEW, LEVL III, 30-44 MIN: ICD-10-PCS | Mod: S$GLB,,, | Performed by: SURGERY

## 2022-07-11 PROCEDURE — 99203 OFFICE O/P NEW LOW 30 MIN: CPT | Mod: S$GLB,,, | Performed by: SURGERY

## 2022-07-11 PROCEDURE — 99999 PR PBB SHADOW E&M-EST. PATIENT-LVL III: CPT | Mod: PBBFAC,,, | Performed by: SURGERY

## 2022-07-11 PROCEDURE — 99999 PR PBB SHADOW E&M-EST. PATIENT-LVL III: ICD-10-PCS | Mod: PBBFAC,,, | Performed by: SURGERY

## 2022-07-12 PROBLEM — S30.817A PERIANAL EXCORIATION: Status: ACTIVE | Noted: 2022-07-12

## 2022-07-12 NOTE — PROGRESS NOTES
"CRS Office Visit History and Physical    Referring Md:   Aaareferral Self  No address on file    SUBJECTIVE:     Chief Complaint: anal pain    History of Present Illness:  The patient is a new patient to this practice.   Course is as follows:  Danilo Zambrano is a 42 y.o. male with history of anal fissure (? Sphincterotomy 10 years ago) and internal hemorrhoids who presents with worsening anal pain.  He reports that he had surgery for an anal fissure about 10 years ago in [place], but never had follow up.  He reports worsening symptoms since July 4th.  He reports perianal pain with defecation and has been taking scalding hot baths for pain relief.  He does not take any fiber supplements or laxatives.     Last Colonoscopy: 10 years ago    Review of patient's allergies indicates:  No Known Allergies    Past Medical History:   Diagnosis Date    Genital herpes     Stable angina 07/03/2017     Past Surgical History:   Procedure Laterality Date    cardiac cath  07/03/2017    stable angina, Dr. Dobbins    NASAL SEPTUM SURGERY      TONSILLECTOMY       Family History   Problem Relation Age of Onset    Heart disease Father         40s    Heart disease Paternal Uncle     Heart disease Maternal Grandfather     Heart disease Paternal Grandfather      Social History     Tobacco Use    Smoking status: Never Smoker    Smokeless tobacco: Never Used   Substance Use Topics    Alcohol use: No    Drug use: No        Review of Systems:  Review of Systems   Constitutional: Negative.    HENT: Negative.    Eyes: Negative.    Respiratory: Negative.    Cardiovascular: Negative.    Gastrointestinal:        See HPI   Genitourinary: Negative.    Musculoskeletal: Negative.    Skin: Negative.    Neurological: Negative.    Psychiatric/Behavioral: Negative.        OBJECTIVE:     Vital Signs (Most Recent)  /88 (BP Location: Right arm, Patient Position: Sitting, BP Method: Large (Automatic))   Pulse 86   Ht 6' 2" (1.88 m)  "  Wt 129.3 kg (285 lb 0.9 oz)   BMI 36.60 kg/m²     Physical Exam:  General: 42 y.o. male in no distress   Neuro: alert and oriented x 4.  Moves all extremities.     HEENT: normocephalic, atraumatic, PERRL, EOMI   Respiratory: respirations are even and unlabored  Cardiac: regular rate and rhythm  Abdomen: soft, NTND  Extremities: Warm dry and intact  Skin: no rashes  Anorectal: chronic appearing perianal excoriation without fissure or external hemorrhoids.  Poor rectal tone and squeeze on exam.     Anoscopy:   Verbal consent obtained.  A lubricated anoscope was inserted into the anal canal and circumferential inspection was performed.  There were grade I nonbleeding internal hemorrhoids. The anoscope was withdrawn.     Labs: NA    Imaging: NA      ASSESSMENT/PLAN:     Diagnoses and all orders for this visit:    Perianal excoriation        42 y.o. male with severe perianal excoriation    - I suspect the patient's discomfort is related to his perianal excoriation.  He has evidence of excessive perianal moisture likely related to anal seepage of mucus.   Additionally, he uses wet wipes which contribute to excess moisture.  We discussed changing to dry toilet paper or trying to gently blot dry with cotton balls or a hair dryer on cool.  I also recommend calmoseptine perianally BID.   - We discussed other potential diagnoses.  Patient may require biopsy if excoriation does not improve with medical management  - Also recommended starting daily fiber supplement to help bulk stool and facilitate passage.     Meme Hills MD  Staff Surgeon  Colon & Rectal Surgery

## 2022-07-22 ENCOUNTER — TELEPHONE (OUTPATIENT)
Dept: BARIATRICS | Facility: CLINIC | Age: 42
End: 2022-07-22
Payer: COMMERCIAL

## 2022-07-25 ENCOUNTER — TELEPHONE (OUTPATIENT)
Dept: BARIATRICS | Facility: CLINIC | Age: 42
End: 2022-07-25
Payer: COMMERCIAL

## 2022-08-12 ENCOUNTER — LAB VISIT (OUTPATIENT)
Dept: LAB | Facility: HOSPITAL | Age: 42
End: 2022-08-12
Attending: INTERNAL MEDICINE
Payer: COMMERCIAL

## 2022-08-12 DIAGNOSIS — R93.1 AGATSTON CORONARY ARTERY CALCIUM SCORE LESS THAN 100: ICD-10-CM

## 2022-08-12 LAB
ALBUMIN SERPL BCP-MCNC: 4.2 G/DL (ref 3.5–5.2)
ALP SERPL-CCNC: 88 U/L (ref 55–135)
ALT SERPL W/O P-5'-P-CCNC: 49 U/L (ref 10–44)
ANION GAP SERPL CALC-SCNC: 8 MMOL/L (ref 8–16)
AST SERPL-CCNC: 30 U/L (ref 10–40)
BILIRUB SERPL-MCNC: 1.1 MG/DL (ref 0.1–1)
BUN SERPL-MCNC: 11 MG/DL (ref 6–20)
CALCIUM SERPL-MCNC: 9.3 MG/DL (ref 8.7–10.5)
CHLORIDE SERPL-SCNC: 107 MMOL/L (ref 95–110)
CHOLEST SERPL-MCNC: 137 MG/DL (ref 120–199)
CHOLEST/HDLC SERPL: 3.3 {RATIO} (ref 2–5)
CO2 SERPL-SCNC: 28 MMOL/L (ref 23–29)
CREAT SERPL-MCNC: 0.8 MG/DL (ref 0.5–1.4)
EST. GFR  (NO RACE VARIABLE): >60 ML/MIN/1.73 M^2
GLUCOSE SERPL-MCNC: 92 MG/DL (ref 70–110)
HDLC SERPL-MCNC: 41 MG/DL (ref 40–75)
HDLC SERPL: 29.9 % (ref 20–50)
LDLC SERPL CALC-MCNC: 68.8 MG/DL (ref 63–159)
NONHDLC SERPL-MCNC: 96 MG/DL
POTASSIUM SERPL-SCNC: 4.4 MMOL/L (ref 3.5–5.1)
PROT SERPL-MCNC: 7.6 G/DL (ref 6–8.4)
SODIUM SERPL-SCNC: 143 MMOL/L (ref 136–145)
TRIGL SERPL-MCNC: 136 MG/DL (ref 30–150)

## 2022-08-12 PROCEDURE — 80053 COMPREHEN METABOLIC PANEL: CPT | Performed by: INTERNAL MEDICINE

## 2022-08-12 PROCEDURE — 36415 COLL VENOUS BLD VENIPUNCTURE: CPT | Performed by: INTERNAL MEDICINE

## 2022-08-12 PROCEDURE — 80061 LIPID PANEL: CPT | Performed by: INTERNAL MEDICINE

## 2022-08-12 NOTE — PROGRESS NOTES
As Dr. Matthews requested I informed Mr. Zambrano that his labs look good and to stay on statin. He verbalized and understood.

## 2022-09-20 ENCOUNTER — PATIENT MESSAGE (OUTPATIENT)
Dept: CARDIOLOGY | Facility: CLINIC | Age: 42
End: 2022-09-20
Payer: COMMERCIAL

## 2022-09-22 ENCOUNTER — PATIENT MESSAGE (OUTPATIENT)
Dept: INTERNAL MEDICINE | Facility: CLINIC | Age: 42
End: 2022-09-22
Payer: COMMERCIAL

## 2022-09-22 DIAGNOSIS — L72.9 CYST OF SKIN: Primary | ICD-10-CM

## 2022-09-22 NOTE — TELEPHONE ENCOUNTER
Dr Matthews updated. He stated that pt should watch his salt intake and no change in tx needed at present. He also stated that if pt was further concerned about the issue, we should schedule him for a f/u appt w. CUAUHTEMOC/ fellow. Pt updated and verbalized understanding. He stated that he did not feel a need for an appt as dr Matthews was not overly concerned about his legs (different words, general meaning the same).

## 2022-10-06 ENCOUNTER — PATIENT MESSAGE (OUTPATIENT)
Dept: BARIATRICS | Facility: CLINIC | Age: 42
End: 2022-10-06
Payer: COMMERCIAL

## 2023-05-08 ENCOUNTER — TELEPHONE (OUTPATIENT)
Dept: INTERNAL MEDICINE | Facility: CLINIC | Age: 43
End: 2023-05-08
Payer: COMMERCIAL

## 2023-05-08 ENCOUNTER — TELEPHONE (OUTPATIENT)
Dept: SURGERY | Facility: CLINIC | Age: 43
End: 2023-05-08
Payer: COMMERCIAL

## 2023-05-08 NOTE — TELEPHONE ENCOUNTER
Left message for pt to return call regarding ongoing symptoms. CRS number provided.       ----- Message from Karlene Lopez sent at 5/8/2023  3:30 PM CDT -----  Type:  Patient Returning Call    Who Called:Pt   Would the patient rather a call back or a response via [facility]? Call back   Best Call Back Number:[phone]  Additional Information: Portal Message       Message  Appointment Request From: Danilo Zambrano  With Provider: Meme Hills MD [[facility]]  Preferred Date Range: Any  Preferred Times: Any Time  Reason for visit: Surgery    Comments:  Next steps

## 2023-05-08 NOTE — TELEPHONE ENCOUNTER
Called and spoke to pt. Pt c/o non productive cough,sore throat and chills onset Friday. Pt denies fever.Pt also c/I right low bacl pain for 2 months. Pt denies urinary frequency, urgency, and hematuria. Pt scheduled for same day appointment.

## 2023-05-08 NOTE — TELEPHONE ENCOUNTER
----- Message from Freya Burger sent at 5/6/2023 11:36 AM CDT -----  Contact: pt  Type:  Sooner Appointment Request    Caller is requesting a sooner appointment.  Caller declined first available appointment listed below.  Caller will not accept being placed on the waitlist and is requesting a message be sent to doctor.  Name of Caller:pt My Chart Request   When is the first available appointment?05/19  Symptoms:Kidney Pain    Would the patient rather a call back or a response via [facility]? Call or My Chart   Best Call Back Number:[phone]  Additional Information:

## 2023-05-08 NOTE — TELEPHONE ENCOUNTER
Called pt; pt answered    Pt asking for earlier appt   No earlier appt available with CHASE Fritz or other providers    Pt reports kidney pain for 3+ weeks  Pt reports feeling sick after going out this weekend    Pt forwarded to PIERRE Morales RN for triage

## 2023-05-09 ENCOUNTER — OFFICE VISIT (OUTPATIENT)
Dept: INTERNAL MEDICINE | Facility: CLINIC | Age: 43
End: 2023-05-09
Payer: COMMERCIAL

## 2023-05-09 ENCOUNTER — HOSPITAL ENCOUNTER (OUTPATIENT)
Dept: RADIOLOGY | Facility: HOSPITAL | Age: 43
Discharge: HOME | End: 2023-05-09
Attending: INTERNAL MEDICINE
Payer: COMMERCIAL

## 2023-05-09 VITALS
BODY MASS INDEX: 37.68 KG/M2 | WEIGHT: 293.63 LBS | SYSTOLIC BLOOD PRESSURE: 120 MMHG | HEIGHT: 74 IN | HEART RATE: 65 BPM | DIASTOLIC BLOOD PRESSURE: 90 MMHG | OXYGEN SATURATION: 97 %

## 2023-05-09 DIAGNOSIS — G89.29 CHRONIC RIGHT-SIDED LOW BACK PAIN WITHOUT SCIATICA: ICD-10-CM

## 2023-05-09 DIAGNOSIS — M54.50 CHRONIC RIGHT-SIDED LOW BACK PAIN WITHOUT SCIATICA: ICD-10-CM

## 2023-05-09 DIAGNOSIS — R05.1 ACUTE COUGH: Primary | ICD-10-CM

## 2023-05-09 DIAGNOSIS — R09.81 SINUS CONGESTION: ICD-10-CM

## 2023-05-09 DIAGNOSIS — Z86.16 HISTORY OF COVID-19: ICD-10-CM

## 2023-05-09 DIAGNOSIS — R79.89 ELEVATED LFTS: ICD-10-CM

## 2023-05-09 LAB
BILIRUB UR QL STRIP: NEGATIVE
CLARITY UR REFRACT.AUTO: CLEAR
COLOR UR AUTO: YELLOW
CTP QC/QA: YES
CTP QC/QA: YES
FLUAV AG NPH QL: NEGATIVE
FLUBV AG NPH QL: NEGATIVE
GLUCOSE UR QL STRIP: NEGATIVE
HGB UR QL STRIP: NEGATIVE
KETONES UR QL STRIP: NEGATIVE
LEUKOCYTE ESTERASE UR QL STRIP: NEGATIVE
MICROSCOPIC COMMENT: NORMAL
NITRITE UR QL STRIP: NEGATIVE
PH UR STRIP: 5 [PH] (ref 5–8)
PROT UR QL STRIP: NEGATIVE
RBC #/AREA URNS AUTO: 1 /HPF (ref 0–4)
SARS-COV-2 RDRP RESP QL NAA+PROBE: NEGATIVE
SP GR UR STRIP: 1.02 (ref 1–1.03)
SQUAMOUS #/AREA URNS AUTO: 0 /HPF
URN SPEC COLLECT METH UR: NORMAL
WBC #/AREA URNS AUTO: 2 /HPF (ref 0–5)

## 2023-05-09 PROCEDURE — 81001 URINALYSIS AUTO W/SCOPE: CPT | Performed by: INTERNAL MEDICINE

## 2023-05-09 PROCEDURE — 99214 PR OFFICE/OUTPT VISIT, EST, LEVL IV, 30-39 MIN: ICD-10-PCS | Mod: S$GLB,,, | Performed by: INTERNAL MEDICINE

## 2023-05-09 PROCEDURE — 87635 SARS-COV-2 COVID-19 AMP PRB: CPT | Mod: QW,S$GLB,, | Performed by: INTERNAL MEDICINE

## 2023-05-09 PROCEDURE — 87804 POCT INFLUENZA A/B: ICD-10-PCS | Mod: 59,QW,S$GLB, | Performed by: INTERNAL MEDICINE

## 2023-05-09 PROCEDURE — 87635: ICD-10-PCS | Mod: QW,S$GLB,, | Performed by: INTERNAL MEDICINE

## 2023-05-09 PROCEDURE — 99214 OFFICE O/P EST MOD 30 MIN: CPT | Mod: S$GLB,,, | Performed by: INTERNAL MEDICINE

## 2023-05-09 PROCEDURE — 99999 PR PBB SHADOW E&M-EST. PATIENT-LVL III: ICD-10-PCS | Mod: PBBFAC,,, | Performed by: INTERNAL MEDICINE

## 2023-05-09 PROCEDURE — 99999 PR PBB SHADOW E&M-EST. PATIENT-LVL III: CPT | Mod: PBBFAC,,, | Performed by: INTERNAL MEDICINE

## 2023-05-09 PROCEDURE — 72100 X-RAY EXAM L-S SPINE 2/3 VWS: CPT | Mod: 26,,, | Performed by: RADIOLOGY

## 2023-05-09 PROCEDURE — 72100 XR LUMBAR SPINE AP AND LATERAL: ICD-10-PCS | Mod: 26,,, | Performed by: RADIOLOGY

## 2023-05-09 PROCEDURE — 72100 X-RAY EXAM L-S SPINE 2/3 VWS: CPT | Mod: TC

## 2023-05-09 PROCEDURE — 87804 INFLUENZA ASSAY W/OPTIC: CPT | Mod: 59,QW,S$GLB, | Performed by: INTERNAL MEDICINE

## 2023-05-09 NOTE — PROGRESS NOTES
CC:  Flu type symptoms plus back pain     HPI:  The patient is a 43-year-old male with hepatosplenomegaly who presents today with complaints flu type symptoms.  Symptoms started approximately 5 days ago with a mild cough.  It progressed over the weekend where he had a low-grade fever, diarrhea and worsening cough.  He has been taking Mucinex, Allegra and Delsym.  This morning his cough is better.  He does report he is had COVID several times the past 3 years, at least 6-7 times.  He reports the joke at work by his coworkers is that he does not need to catch every variant.  He also reports having right lower back pain which is chronic.  He feels it mostly in the mornings.  Symptoms improve as the day progresses.  He describes a stream soreness.  He does not have symptoms today.  His coworkers were concerned it might represent a kidney stone.  He reports no bloody urine.    ROS:  Patient does have about a 30 lb weight gain over the past year and a half to 2 years.  He is concerned about his lungs due to repeated COVID-19.  He does have a history of rectal fissure and does have some anal leakage.  He does have a history of fatty liver with elevated transaminitis.      Physical exam:   General appearance:  No acute distress   HEENT:  Conjunctiva is clear.  Pupils equal reactive.  He is no photophobia.  TMs clear.  Nasal septum is midline without discharge.  Oropharynx without erythema trachea is midline without JVD or thyromegaly.    Pulmonary:  Good inspiratory, expiratory breath sounds were heard.  His lungs are clear to auscultation.  He did have a loose cough.  It did not appreciate any wheezing rhonchi.    Cardiovascular:  S1-S2, rhythm regular.  Extremities without edema.    GI: Abdomen was nontender, nondistended without hepatosplenomegaly  Ortho:  Lumbar spine exam was performed.  The patient good range of motion with flexion, extension, lateral bending and rotation.  He had negative leg  raises.    Assessment:  1. Cough and sinus congestion  2.  History repeated COVID-19 infections   3. Right lower back pain  4. Elevated transaminases  5.  History enlarged liver and spleen on ultrasound    Plan:  1. Will order a flu and COVID test  2. Put the patient in her lumbar spine film  3. Will order a UA  4.  Will order a CMP  5.  Will order a spirometry test to check his lungs.

## 2023-05-10 DIAGNOSIS — M54.50 CHRONIC RIGHT-SIDED LOW BACK PAIN WITHOUT SCIATICA: Primary | ICD-10-CM

## 2023-05-10 DIAGNOSIS — G89.29 CHRONIC RIGHT-SIDED LOW BACK PAIN WITHOUT SCIATICA: Primary | ICD-10-CM

## 2023-05-10 DIAGNOSIS — R79.89 ELEVATED LFTS: ICD-10-CM

## 2023-05-11 ENCOUNTER — TELEPHONE (OUTPATIENT)
Dept: INTERNAL MEDICINE | Facility: CLINIC | Age: 43
End: 2023-05-11
Payer: COMMERCIAL

## 2023-05-11 ENCOUNTER — PATIENT MESSAGE (OUTPATIENT)
Dept: INTERNAL MEDICINE | Facility: CLINIC | Age: 43
End: 2023-05-11
Payer: COMMERCIAL

## 2023-05-11 NOTE — TELEPHONE ENCOUNTER
----- Message from Kane Marie MD sent at 5/10/2023  6:31 PM CDT -----  Please contact patient.  His blood test showed his liver enzymes are elevated.  I would like for him to see hepatology.  His back x-ray look very good.  Please refer him to physical therapy.  Orders are in for both.   Prophylactic measure Prophylactic measure Prophylactic measure Prophylactic measure

## 2023-05-12 DIAGNOSIS — Z86.19 HISTORY OF HERPES GENITALIS: ICD-10-CM

## 2023-05-15 RX ORDER — VALACYCLOVIR HYDROCHLORIDE 500 MG/1
TABLET, FILM COATED ORAL
Qty: 90 TABLET | Refills: 0 | Status: SHIPPED | OUTPATIENT
Start: 2023-05-15 | End: 2023-05-16 | Stop reason: SDUPTHER

## 2023-05-16 ENCOUNTER — HOSPITAL ENCOUNTER (OUTPATIENT)
Dept: RADIOLOGY | Facility: HOSPITAL | Age: 43
Discharge: HOME | End: 2023-05-16
Attending: PHYSICIAN ASSISTANT
Payer: COMMERCIAL

## 2023-05-16 ENCOUNTER — OFFICE VISIT (OUTPATIENT)
Dept: INTERNAL MEDICINE | Facility: CLINIC | Age: 43
End: 2023-05-16
Payer: COMMERCIAL

## 2023-05-16 ENCOUNTER — PATIENT MESSAGE (OUTPATIENT)
Dept: INTERNAL MEDICINE | Facility: CLINIC | Age: 43
End: 2023-05-16

## 2023-05-16 VITALS
SYSTOLIC BLOOD PRESSURE: 126 MMHG | OXYGEN SATURATION: 97 % | HEART RATE: 75 BPM | WEIGHT: 293.88 LBS | BODY MASS INDEX: 37.72 KG/M2 | DIASTOLIC BLOOD PRESSURE: 82 MMHG | HEIGHT: 74 IN

## 2023-05-16 DIAGNOSIS — S09.90XA INJURY OF HEAD, INITIAL ENCOUNTER: ICD-10-CM

## 2023-05-16 DIAGNOSIS — Z86.19 HISTORY OF HERPES GENITALIS: ICD-10-CM

## 2023-05-16 DIAGNOSIS — R05.3 PERSISTENT COUGH: Primary | ICD-10-CM

## 2023-05-16 DIAGNOSIS — G47.9 SLEEP DISTURBANCE: Primary | ICD-10-CM

## 2023-05-16 DIAGNOSIS — E66.09 CLASS 2 OBESITY DUE TO EXCESS CALORIES WITHOUT SERIOUS COMORBIDITY WITH BODY MASS INDEX (BMI) OF 37.0 TO 37.9 IN ADULT: ICD-10-CM

## 2023-05-16 DIAGNOSIS — R05.3 PERSISTENT COUGH: ICD-10-CM

## 2023-05-16 DIAGNOSIS — K60.2 ANAL FISSURE: ICD-10-CM

## 2023-05-16 PROCEDURE — 99999 PR PBB SHADOW E&M-EST. PATIENT-LVL V: CPT | Mod: PBBFAC,,, | Performed by: PHYSICIAN ASSISTANT

## 2023-05-16 PROCEDURE — 99214 PR OFFICE/OUTPT VISIT, EST, LEVL IV, 30-39 MIN: ICD-10-PCS | Mod: S$GLB,,, | Performed by: PHYSICIAN ASSISTANT

## 2023-05-16 PROCEDURE — 71046 XR CHEST PA AND LATERAL: ICD-10-PCS | Mod: 26,,, | Performed by: RADIOLOGY

## 2023-05-16 PROCEDURE — 71046 X-RAY EXAM CHEST 2 VIEWS: CPT | Mod: 26,,, | Performed by: RADIOLOGY

## 2023-05-16 PROCEDURE — 99999 PR PBB SHADOW E&M-EST. PATIENT-LVL V: ICD-10-PCS | Mod: PBBFAC,,, | Performed by: PHYSICIAN ASSISTANT

## 2023-05-16 PROCEDURE — 71046 X-RAY EXAM CHEST 2 VIEWS: CPT | Mod: TC

## 2023-05-16 PROCEDURE — 99214 OFFICE O/P EST MOD 30 MIN: CPT | Mod: S$GLB,,, | Performed by: PHYSICIAN ASSISTANT

## 2023-05-16 RX ORDER — BENZONATATE 100 MG/1
100 CAPSULE ORAL 3 TIMES DAILY PRN
Qty: 20 CAPSULE | Refills: 0 | Status: SHIPPED | OUTPATIENT
Start: 2023-05-16 | End: 2023-05-26

## 2023-05-16 RX ORDER — VALACYCLOVIR HYDROCHLORIDE 500 MG/1
TABLET, FILM COATED ORAL
Qty: 90 TABLET | Refills: 0 | Status: SHIPPED | OUTPATIENT
Start: 2023-05-16 | End: 2023-09-19 | Stop reason: SDUPTHER

## 2023-05-16 RX ORDER — ROSUVASTATIN CALCIUM 10 MG/1
10 TABLET, COATED ORAL DAILY
Qty: 90 TABLET | Refills: 3 | Status: CANCELLED | OUTPATIENT
Start: 2023-05-16

## 2023-05-16 RX ORDER — ALBUTEROL SULFATE 90 UG/1
2 AEROSOL, METERED RESPIRATORY (INHALATION) EVERY 6 HOURS PRN
Qty: 8 G | Refills: 0 | Status: SHIPPED | OUTPATIENT
Start: 2023-05-16 | End: 2023-06-08

## 2023-05-16 NOTE — PROGRESS NOTES
"Subjective     Patient ID: Danilo Zambrano is a 43 y.o. male.    Chief Complaint: Follow-up    HPI    Pt here with a few concerns.     Seen about 2 weeks ago for acute cogh, today he notes cough persists, productive of yellow phlegm with occ wheezing and . Tried several OTC meds (antihistamine /mucinex)    Hit is head on marble counter/kitchen island yesterday. No LOC, vomiting, vision changes, laceration, swelling or bruising.     Request to see a different provided in colon rectal clinic for anal fissure/leakage    Struggle to lose gallito, referred to weight loss clinic a few years ago but had scheduling conflicts. Interested in our health  as well.     Past Medical History:   Diagnosis Date    Genital herpes     Stable angina 2017     Social History     Tobacco Use    Smoking status: Never    Smokeless tobacco: Never   Substance Use Topics    Alcohol use: No    Drug use: No     Review of patient's allergies indicates:  No Known Allergies      Review of Systems   Constitutional:  Negative for chills, fever and unexpected weight change.   Respiratory:  Negative for cough, shortness of breath and wheezing.    Cardiovascular:  Negative for chest pain and leg swelling.   Gastrointestinal:  Negative for abdominal pain, nausea and vomiting.   Integumentary:  Negative for rash.   Neurological:  Negative for weakness and headaches.        Objective  /82 (BP Location: Left arm, Patient Position: Sitting, BP Method: Large (Manual))   Pulse 75   Ht 6' 2" (1.88 m)   Wt 133.3 kg (293 lb 14 oz)   SpO2 97%   BMI 37.73 kg/m²       Physical Exam  Vitals reviewed.   Constitutional:       General: He is not in acute distress.     Appearance: He is well-developed.   HENT:      Head: Normocephalic and atraumatic. No abrasion, contusion or laceration.      Right Ear: Tympanic membrane, ear canal and external ear normal.      Left Ear: Tympanic membrane, ear canal and external ear normal.   Eyes:      " General: Vision grossly intact.      Extraocular Movements:      Right eye: Normal extraocular motion and no nystagmus.      Left eye: Normal extraocular motion and no nystagmus.      Conjunctiva/sclera: Conjunctivae normal.   Cardiovascular:      Rate and Rhythm: Normal rate and regular rhythm.      Heart sounds: No murmur heard.  Pulmonary:      Effort: Pulmonary effort is normal.      Breath sounds: Wheezing (scant expiratory) present. No rales.   Abdominal:      General: Bowel sounds are normal.      Palpations: Abdomen is soft.      Tenderness: There is no abdominal tenderness.   Musculoskeletal:      Right lower leg: No edema.      Left lower leg: No edema.   Skin:     General: Skin is warm and dry.      Findings: No rash.   Neurological:      Mental Status: He is alert and oriented to person, place, and time.      Cranial Nerves: No dysarthria or facial asymmetry.      Sensory: Sensation is intact.      Motor: Motor function is intact. No pronator drift.      Coordination: Coordination is intact. Finger-Nose-Finger Test normal.      Gait: Gait and tandem walk normal.   Psychiatric:         Mood and Affect: Mood normal.          Assessment and Plan     Problem List Items Addressed This Visit    None  Visit Diagnoses       Persistent cough    -  Primary    History of herpes genitalis        Injury of head, initial encounter                Danilo Lockett was seen today for follow-up.    Diagnoses and all orders for this visit:    Persistent cough  -     X-Ray Chest PA And Lateral; Future  -     benzonatate (TESSALON) 100 MG capsule; Take 1 capsule (100 mg total) by mouth 3 (three) times daily as needed.  -     albuterol (VENTOLIN HFA) 90 mcg/actuation inhaler; Inhale 2 puffs into the lungs every 6 (six) hours as needed for Wheezing. Rescue    Injury of head, initial encounter  No neuro deficits on exam  Ice, tylenol as needed  Notify clinic if symptoms worsen or fail to improve    Anal fissure  -     Ambulatory  referral/consult to Colorectal Surgery; Future    Class 2 obesity due to excess calories without serious comorbidity with body mass index (BMI) of 37.0 to 37.9 in adult  -     Ambulatory referral/consult to Bariatric Medicine; Future    History of herpes genitalis  -     valACYclovir (VALTREX) 500 MG tablet; TAKE 1 TABLET BY MOUTH TWICE DAILY FOR OUTBREAK/RECURRENCE EPISODES    Discussed need to choose MD as PCP to fully establish care with our practice site (list provided)    Marina Fritz PA-C

## 2023-05-16 NOTE — PATIENT INSTRUCTIONS
Xray today    Colon rectal clinic    Health : [phone]    Proceed with physical therapy     I placed a new referral to Bariatric Medicine clinic.

## 2023-05-17 ENCOUNTER — OFFICE VISIT (OUTPATIENT)
Dept: SURGERY | Facility: CLINIC | Age: 43
End: 2023-05-17
Payer: COMMERCIAL

## 2023-05-17 VITALS
DIASTOLIC BLOOD PRESSURE: 80 MMHG | HEART RATE: 68 BPM | SYSTOLIC BLOOD PRESSURE: 119 MMHG | WEIGHT: 294.31 LBS | HEIGHT: 74 IN | BODY MASS INDEX: 37.77 KG/M2

## 2023-05-17 DIAGNOSIS — K60.2 ANAL FISSURE: ICD-10-CM

## 2023-05-17 DIAGNOSIS — K64.8 INTERNAL HEMORRHOIDS: Primary | ICD-10-CM

## 2023-05-17 DIAGNOSIS — K62.89 ANAL IRRITATION: ICD-10-CM

## 2023-05-17 PROCEDURE — 46606 PR A2SCOPY AND BIOPSY: ICD-10-PCS | Mod: S$GLB,,, | Performed by: NURSE PRACTITIONER

## 2023-05-17 PROCEDURE — 88305 TISSUE EXAM BY PATHOLOGIST: ICD-10-PCS | Mod: 26,,, | Performed by: PATHOLOGY

## 2023-05-17 PROCEDURE — 88305 TISSUE EXAM BY PATHOLOGIST: CPT | Performed by: PATHOLOGY

## 2023-05-17 PROCEDURE — 88312 SPECIAL STAINS GROUP 1: CPT | Mod: 26,,, | Performed by: PATHOLOGY

## 2023-05-17 PROCEDURE — 46606 ANOSCOPY AND BIOPSY: CPT | Mod: S$GLB,,, | Performed by: NURSE PRACTITIONER

## 2023-05-17 PROCEDURE — 99999 PR PBB SHADOW E&M-EST. PATIENT-LVL IV: CPT | Mod: PBBFAC,,, | Performed by: NURSE PRACTITIONER

## 2023-05-17 PROCEDURE — 99214 PR OFFICE/OUTPT VISIT, EST, LEVL IV, 30-39 MIN: ICD-10-PCS | Mod: 25,S$GLB,, | Performed by: NURSE PRACTITIONER

## 2023-05-17 PROCEDURE — 99214 OFFICE O/P EST MOD 30 MIN: CPT | Mod: 25,S$GLB,, | Performed by: NURSE PRACTITIONER

## 2023-05-17 PROCEDURE — 88312 PR  SPECIAL STAINS,GROUP I: ICD-10-PCS | Mod: 26,,, | Performed by: PATHOLOGY

## 2023-05-17 PROCEDURE — 88312 SPECIAL STAINS GROUP 1: CPT | Performed by: PATHOLOGY

## 2023-05-17 PROCEDURE — 99999 PR PBB SHADOW E&M-EST. PATIENT-LVL IV: ICD-10-PCS | Mod: PBBFAC,,, | Performed by: NURSE PRACTITIONER

## 2023-05-17 PROCEDURE — 88305 TISSUE EXAM BY PATHOLOGIST: CPT | Mod: 26,,, | Performed by: PATHOLOGY

## 2023-05-17 RX ORDER — HYDROCORTISONE 25 MG/G
CREAM TOPICAL 2 TIMES DAILY
Qty: 28 G | Refills: 1 | Status: SHIPPED | OUTPATIENT
Start: 2023-05-17 | End: 2023-06-21

## 2023-05-17 NOTE — TELEPHONE ENCOUNTER
Pt was seen yesterday    Pt asks about chest xray results    Pt also asks about possible sleep study and cpap      Please advise

## 2023-05-17 NOTE — PROGRESS NOTES
CRS Office Visit History and Physical    Referring Md:   Marina Fritz Pa-c  [address] [place] [address]    SUBJECTIVE:   History of Present Illness w Dr Hills 7/11/2022:  The patient is a new patient to this practice.   Course is as follows:  Danilo Zambrano is a 42 y.o. male with history of anal fissure (? Sphincterotomy 10 years ago) and internal hemorrhoids who presents with worsening anal pain.  He reports that he had surgery for an anal fissure about 10 years ago in [place], but never had follow up.  He reports worsening symptoms since July 4th.  He reports perianal pain with defecation and has been taking scalding hot baths for pain relief.  He does not take any fiber supplements or laxatives.      Last Colonoscopy: 10 years ago    Interval hx 5/17/2023 w Elva NP  Pt new to me. Here for second opinion.   States he started fiber pills, taking 8 a day and this has helped bulk his stool. Has continued with calmoseptine cream around anus and it has improved but not resolved the perianal irritation.   Still having BRBPR with a BM, he feels his internal hemorrhoids prolapsing but they do reduce. +anorectal pain. +rectal leakage of mucous and blood but not stool.   he states midline, R anterior he still has a cut, this is where the most pain is located.  Denies wet wipe use    Review of patient's allergies indicates:  No Known Allergies    Past Medical History:   Diagnosis Date    Genital herpes     Stable angina 07/03/2017     Past Surgical History:   Procedure Laterality Date    cardiac cath  07/03/2017    stable angina, Dr. Dobbins    NASAL SEPTUM SURGERY      TONSILLECTOMY       Family History   Problem Relation Age of Onset    Heart disease Father         40s    Heart disease Paternal Uncle     Heart disease Maternal Grandfather     Heart disease Paternal Grandfather      Social History     Tobacco Use    Smoking status: Never    Smokeless tobacco: Never   Substance Use Topics     "Alcohol use: No    Drug use: No        Review of Systems:  Review of Systems   Constitutional:         Weight gain   Respiratory:  Positive for cough.    Gastrointestinal:  Negative for nausea and vomiting.     OBJECTIVE:     Vital Signs (Most Recent)  /80 (BP Location: Left arm, Patient Position: Sitting, BP Method: Small (Automatic))   Pulse 68   Ht 6' 2" (1.88 m)   Wt 133.5 kg (294 lb 5 oz)   BMI 37.79 kg/m²     Physical Exam:  General: White male in no distress   Neuro: Alert and oriented to person, place, and time.  Moves all extremities.     HEENT: No icterus.  Trachea midline  Skin: Warm dry and intact, No visible rashes, no jaundice    Labs reviewed today:  Lab Results   Component Value Date    WBC 7.40 11/18/2021    HGB 15.0 11/18/2021    HCT 44.6 11/18/2021     11/18/2021    CHOL 137 08/12/2022    TRIG 136 08/12/2022    HDL 41 08/12/2022    ALT 68 (H) 05/09/2023    AST 40 05/09/2023     05/09/2023    K 4.1 05/09/2023     05/09/2023    CREATININE 0.9 05/09/2023    BUN 11 05/09/2023    CO2 26 05/09/2023    TSH 1.716 11/18/2021    INR 1.0 07/17/2020    HGBA1C 5.5 11/18/2021     Anorectal Exam:    Anal Skin: Excoriated    Digital Rectal Exam:  Resting Tone normal  Mass none  Tenderness  absent    Anoscopy:  Verbal consent was obtained.   Clear plastic anoscope inserted.    Hemorrhoids  present  Stigmata of bleeding  present  Stigmata of prolapsed  present  Distal rectal mucosa normal    Procedure: consent obtained verbally for punch bx.  Perianal area cleansed w iodine. Small punch bx obtained from R anterior midline area since this is his most reported painful area. Bleeding controlled with nitrate sticks, <3 cc of blood loss. Pt tolerated well    ASSESSMENT/PLAN:     Diagnoses and all orders for this visit:    Internal hemorrhoids  -     hydrocortisone (ANUSOL-HC) 2.5 % rectal cream; Place rectally 2 (two) times daily.    Anal fissure  -     Ambulatory referral/consult to " Colorectal Surgery    Anal irritation  -     Specimen to Pathology Dermatology and skin neoplasms    43 y.o. M here today with continued anal excoriation despite starting fiber and calmoseptine. Area punch bx today. Will await path and have him f/u with MD as he prefers to see MD.   I did not see a fissure on exam. He tolerated internal exam fine. I think the area that is most painful for him is inflamed, edematous tissue, so area close to this was bx.   Internal hemorrhoids likely source of bleeding.     The patient was instructed to:  Await path results  Continue fiber  Anusol BID to internal hemorrhoids  Continue calmoseptine around the clock.   F/u with MD in a couple of weeks    LILIA Rivas  Colon and Rectal Surgery

## 2023-05-17 NOTE — TELEPHONE ENCOUNTER
Cxr is clear, no pneumonia.   I placed referral to sleep clinic, please provide pt with # to schedule.

## 2023-05-18 ENCOUNTER — TELEPHONE (OUTPATIENT)
Dept: HEPATOLOGY | Facility: CLINIC | Age: 43
End: 2023-05-18
Payer: COMMERCIAL

## 2023-05-20 DIAGNOSIS — R05.3 CHRONIC COUGH: ICD-10-CM

## 2023-05-20 DIAGNOSIS — R79.89 LOW TESTOSTERONE IN MALE: Primary | ICD-10-CM

## 2023-05-20 DIAGNOSIS — Z86.16 HISTORY OF COVID-19: ICD-10-CM

## 2023-05-24 NOTE — TELEPHONE ENCOUNTER
Scheduling attempt #1  Called patient to schedule hepatology consult from referral. Contact # listed cannot be reach.

## 2023-05-26 LAB
FINAL PATHOLOGIC DIAGNOSIS: NORMAL
GROSS: NORMAL
Lab: NORMAL
MICROSCOPIC EXAM: NORMAL

## 2023-05-31 NOTE — PROGRESS NOTES
CRS Office Visit History and Physical    Referring Md:   No referring provider defined for this encounter.    SUBJECTIVE:   History of Present Illness w Dr Hills 7/11/2022:  The patient is a new patient to this practice.   Course is as follows:  Danilo Zambrano is a 42 y.o. male with history of anal fissure (? Sphincterotomy 10 years ago) and internal hemorrhoids who presents with worsening anal pain.  He reports that he had surgery for an anal fissure about 10 years ago in [place], but never had follow up.  He reports worsening symptoms since July 4th.  He reports perianal pain with defecation and has been taking scalding hot baths for pain relief.  He does not take any fiber supplements or laxatives.      Last Colonoscopy: 10 years ago    Interval hx 5/17/2023 w Elva NP  Pt new to me. Here for second opinion.   States he started fiber pills, taking 8 a day and this has helped bulk his stool. Has continued with calmoseptine cream around anus and it has improved but not resolved the perianal irritation.   Still having BRBPR with a BM, he feels his internal hemorrhoids prolapsing but they do reduce. +anorectal pain. +rectal leakage of mucous and blood but not stool.   he states midline, R anterior he still has a cut, this is where the most pain is located.  Denies wet wipe use    6/1/23: patient following up today after seeing Mayte Stevenson and Dr. Hills. Bleeding does not happen every bowel movement. He had a colonoscopy at 32. He was recommended to start taking fiber. Sometimes he has hard bowel movements and sometimes he has loose bowel movements. He has been having mucous discharge at least once a day, that sometimes occurs without a bowel movement. Denies ever having an abscess. He has been using calmoseptine daily. He was prescribed a steroid cream but has not really used it. He doesn't note any issues with cleaning after a bowel movement.    Review of patient's allergies indicates:  No Known  Allergies    Past Medical History:   Diagnosis Date    Genital herpes     Stable angina 07/03/2017     Past Surgical History:   Procedure Laterality Date    cardiac cath  07/03/2017    stable angina, Dr. Dobbins    NASAL SEPTUM SURGERY      TONSILLECTOMY       Family History   Problem Relation Age of Onset    Heart disease Father         40s    Heart disease Paternal Uncle     Heart disease Maternal Grandfather     Heart disease Paternal Grandfather      Social History     Tobacco Use    Smoking status: Never    Smokeless tobacco: Never   Substance Use Topics    Alcohol use: No    Drug use: No        Review of Systems:  Review of Systems   Constitutional:         Weight gain   Respiratory:  Negative for cough.    Gastrointestinal:  Negative for nausea and vomiting.     OBJECTIVE:     Vital Signs (Most Recent)  There were no vitals taken for this visit.    Physical Exam:  General: White male in no distress   Neuro: Alert and oriented to person, place, and time.  Moves all extremities.     HEENT: No icterus.  Trachea midline  Skin: Warm dry and intact, No visible rashes, no jaundice    Perineum - excoriated perianal skin, no mass, no fissure, no external hemorrhoids  LEN - good tone, no mass  Anoscopy - Grade 1 internal hemorrhoids   Labs reviewed today:  Lab Results   Component Value Date    WBC 7.76 06/13/2023    HGB 14.2 06/13/2023    HCT 44.8 06/13/2023     06/13/2023    CHOL 137 08/12/2022    TRIG 136 08/12/2022    HDL 41 08/12/2022    ALT 68 (H) 05/09/2023    AST 40 05/09/2023     05/09/2023    K 4.1 05/09/2023     05/09/2023    CREATININE 0.9 05/09/2023    BUN 11 05/09/2023    CO2 26 05/09/2023    TSH 2.192 06/13/2023    INR 1.0 07/17/2020    HGBA1C 5.6 06/13/2023     Anorectal Exam:    Anal Skin: Excoriated    Digital Rectal Exam:  Resting Tone normal  Mass none  Tenderness  absent    Anoscopy:  Verbal consent was obtained.   Clear plastic anoscope inserted.    Hemorrhoids  present  Stigmata of  bleeding  present  Stigmata of prolapsed  present  Distal rectal mucosa normal    Procedure: consent obtained verbally for punch bx.  Perianal area cleansed w iodine. Small punch bx obtained from R anterior midline area since this is his most reported painful area. Bleeding controlled with nitrate sticks, <3 cc of blood loss. Pt tolerated well    ASSESSMENT/PLAN:     Diagnoses and all orders for this visit:    Perianal excoriation      -schedule colonoscopy  -Continue calmoseptine around the clock.

## 2023-06-01 ENCOUNTER — TELEPHONE (OUTPATIENT)
Dept: ENDOSCOPY | Facility: HOSPITAL | Age: 43
End: 2023-06-01
Payer: COMMERCIAL

## 2023-06-01 ENCOUNTER — OFFICE VISIT (OUTPATIENT)
Dept: SURGERY | Facility: CLINIC | Age: 43
End: 2023-06-01
Payer: COMMERCIAL

## 2023-06-01 DIAGNOSIS — S30.817A PERIANAL EXCORIATION: Primary | ICD-10-CM

## 2023-06-01 PROCEDURE — 99999 PR PBB SHADOW E&M-EST. PATIENT-LVL II: CPT | Mod: PBBFAC,,, | Performed by: COLON & RECTAL SURGERY

## 2023-06-01 PROCEDURE — 99213 OFFICE O/P EST LOW 20 MIN: CPT | Mod: S$GLB,,, | Performed by: COLON & RECTAL SURGERY

## 2023-06-01 PROCEDURE — 99999 PR PBB SHADOW E&M-EST. PATIENT-LVL II: ICD-10-PCS | Mod: PBBFAC,,, | Performed by: COLON & RECTAL SURGERY

## 2023-06-01 PROCEDURE — 99213 PR OFFICE/OUTPT VISIT, EST, LEVL III, 20-29 MIN: ICD-10-PCS | Mod: S$GLB,,, | Performed by: COLON & RECTAL SURGERY

## 2023-06-01 NOTE — TELEPHONE ENCOUNTER
Contacted the patient to schedule their procedure(s). The patient did not answer the call and left a voice message asking them to contact Endoscopy Scheduling as soon as possible at [phone].

## 2023-06-01 NOTE — TELEPHONE ENCOUNTER
"----- Message from Isaac Reyez MD sent at 2023 10:56 AM CDT -----  Regarding: colonoscopy      Procedure: Colonoscopy    Diagnosis: Rectal bleeding    Procedure Timin-4 weeks    #If within 4 weeks selected, please tori as high priority#    #If greater than 12 weeks, please select "5-12 weeks" and delay sending until 2 months prior to requested date#     Provider: Myself    Location: [address]    Additional Scheduling Information: No scheduling concerns    Prep Specifications:Standard prep    Have you attached a patient to this message: yes     "

## 2023-06-06 ENCOUNTER — OFFICE VISIT (OUTPATIENT)
Dept: PRIMARY CARE CLINIC | Facility: CLINIC | Age: 43
End: 2023-06-06
Payer: COMMERCIAL

## 2023-06-06 VITALS
OXYGEN SATURATION: 98 % | SYSTOLIC BLOOD PRESSURE: 142 MMHG | DIASTOLIC BLOOD PRESSURE: 92 MMHG | WEIGHT: 295.63 LBS | BODY MASS INDEX: 37.94 KG/M2 | HEIGHT: 74 IN | HEART RATE: 73 BPM

## 2023-06-06 VITALS — HEIGHT: 74 IN | WEIGHT: 280 LBS | BODY MASS INDEX: 35.94 KG/M2

## 2023-06-06 DIAGNOSIS — K62.5 RECTAL BLEEDING: Primary | ICD-10-CM

## 2023-06-06 DIAGNOSIS — L03.012 PARONYCHIA OF LEFT MIDDLE FINGER: Primary | ICD-10-CM

## 2023-06-06 PROCEDURE — 99999 PR PBB SHADOW E&M-EST. PATIENT-LVL III: ICD-10-PCS | Mod: PBBFAC,,, | Performed by: STUDENT IN AN ORGANIZED HEALTH CARE EDUCATION/TRAINING PROGRAM

## 2023-06-06 PROCEDURE — 99213 PR OFFICE/OUTPT VISIT, EST, LEVL III, 20-29 MIN: ICD-10-PCS | Mod: S$GLB,,, | Performed by: STUDENT IN AN ORGANIZED HEALTH CARE EDUCATION/TRAINING PROGRAM

## 2023-06-06 PROCEDURE — 99213 OFFICE O/P EST LOW 20 MIN: CPT | Mod: S$GLB,,, | Performed by: STUDENT IN AN ORGANIZED HEALTH CARE EDUCATION/TRAINING PROGRAM

## 2023-06-06 PROCEDURE — 99999 PR PBB SHADOW E&M-EST. PATIENT-LVL III: CPT | Mod: PBBFAC,,, | Performed by: STUDENT IN AN ORGANIZED HEALTH CARE EDUCATION/TRAINING PROGRAM

## 2023-06-06 RX ORDER — SULFAMETHOXAZOLE AND TRIMETHOPRIM 800; 160 MG/1; MG/1
1 TABLET ORAL 2 TIMES DAILY
Qty: 14 TABLET | Refills: 0 | Status: SHIPPED | OUTPATIENT
Start: 2023-06-06 | End: 2023-06-13

## 2023-06-06 RX ORDER — MUPIROCIN 20 MG/G
OINTMENT TOPICAL 3 TIMES DAILY
Qty: 30 G | Refills: 0 | Status: SHIPPED | OUTPATIENT
Start: 2023-06-06 | End: 2023-06-21

## 2023-06-06 NOTE — TELEPHONE ENCOUNTER
"Isaac Reyez MD  [facility]      Procedure: Colonoscopy     Diagnosis: Rectal bleeding     Procedure Timin-4 weeks     *If within 4 weeks selected, please tori as high priority*     *If greater than 12 weeks, please select "5-12 weeks" and delay sending until 2 months prior to requested date*     Provider: Myself     Location: [address]     Additional Scheduling Information: No scheduling concerns     Prep Specifications:Standard prep     Have you attached a patient to this message: yes   "

## 2023-06-08 ENCOUNTER — PATIENT MESSAGE (OUTPATIENT)
Dept: INTERNAL MEDICINE | Facility: CLINIC | Age: 43
End: 2023-06-08
Payer: COMMERCIAL

## 2023-06-08 DIAGNOSIS — R05.3 PERSISTENT COUGH: ICD-10-CM

## 2023-06-08 RX ORDER — ALBUTEROL SULFATE 90 UG/1
AEROSOL, METERED RESPIRATORY (INHALATION)
Qty: 8.5 G | Refills: 0 | Status: SHIPPED | OUTPATIENT
Start: 2023-06-08 | End: 2023-06-21

## 2023-06-09 NOTE — PROGRESS NOTES
INTERNAL MEDICINE SAME DAY PRIMARY CARE VISIT NOTE    Subjective:     Chief Complaint: Wound Infection       Patient ID: Danilo Zambrano is a 43 y.o. male, here today for focused same-day primary care visit.    Today, patient with complaint of finger pain. L middle finger pain x 3 days. Pain and swelling near the cuticle. Has tried applying peroxide to area twice a day with minimal relief. No fevers. Minor drainage from the cuticle.    Past Medical History:  Past Medical History:   Diagnosis Date    Genital herpes     Stable angina 07/03/2017       Home Medications:  Prior to Admission medications    Medication Sig Start Date End Date Taking? Authorizing Provider   hydrocortisone (ANUSOL-HC) 2.5 % rectal cream Place rectally 2 (two) times daily. 5/17/23  Yes Mayte Stevenson NP   rosuvastatin (CRESTOR) 10 MG tablet Take 1 tablet (10 mg total) by mouth once daily. 7/1/22  Yes Marvin Matthews MD   valACYclovir (VALTREX) 500 MG tablet TAKE 1 TABLET BY MOUTH TWICE DAILY FOR OUTBREAK/RECURRENCE EPISODES 5/16/23  Yes Marina Fritz PA-C   albuterol (PROVENTIL/VENTOLIN HFA) 90 mcg/actuation inhaler INHALE 2 PUFFS BY MOUTH INTO THE LUNGS EVERY 6 HOURS AS NEEDED FOR WHEEZING; RESCUE 6/8/23   Marina Fritz PA-C   mupirocin (BACTROBAN) 2 % ointment Apply topically 3 (three) times daily. 6/6/23   Ebony Guerrero MD   sulfamethoxazole-trimethoprim 800-160mg (BACTRIM DS) 800-160 mg Tab Take 1 tablet by mouth 2 (two) times daily. for 7 days 6/6/23 6/13/23  Ebony Guerrero MD   albuterol (VENTOLIN HFA) 90 mcg/actuation inhaler Inhale 2 puffs into the lungs every 6 (six) hours as needed for Wheezing. Rescue  Patient not taking: Reported on 6/6/2023 5/16/23 6/8/23  Marina Fritz PA-C       Allergies:  Review of patient's allergies indicates:  No Known Allergies    Social History:  Social History     Tobacco Use    Smoking status: Never    Smokeless tobacco: Never   Substance Use Topics    Alcohol use:  "No    Drug use: No         Review of Systems   Constitutional:  Negative for activity change, appetite change, fatigue and unexpected weight change.   Gastrointestinal:  Negative for abdominal pain, constipation and diarrhea.   Musculoskeletal:  Positive for joint swelling. Negative for arthralgias.   Skin:  Positive for wound. Negative for color change and rash.   Neurological:  Negative for weakness.         Objective:   BP (!) 142/92 (BP Location: Right arm, Patient Position: Sitting, BP Method: Large (Manual))   Pulse 73   Ht 6' 2" (1.88 m)   Wt 134.1 kg (295 lb 10.2 oz)   SpO2 98%   BMI 37.96 kg/m²        General: AAO x3, no apparent distress  HEENT: PERRL, OP clear  CV: RRR, no m/r/g  Pulm: Lungs CTAB, no crackles, no wheezes  Abd: s/NT/ND +BS  Extremities: L middle finger paronychia, no fluctuance or erythema    Labs:         Assessment/Plan     Danilo Lockett was seen today for wound infection.    Diagnoses and all orders for this visit:    Paronychia of left middle finger  -     sulfamethoxazole-trimethoprim 800-160mg (BACTRIM DS) 800-160 mg Tab; Take 1 tablet by mouth 2 (two) times daily. for 7 days  -     mupirocin (BACTROBAN) 2 % ointment; Apply topically 3 (three) times daily.    Prn Warm water soaks and twice daily abx. RTC if symptoms worsen or do not improve. Tylenol prn for pain.    RTC prn and with PCP as per routine.    Ebony Guerrero MD  Department of Internal Medicine - [facility]        "

## 2023-06-12 ENCOUNTER — OFFICE VISIT (OUTPATIENT)
Dept: ENDOCRINOLOGY | Facility: CLINIC | Age: 43
End: 2023-06-12
Payer: COMMERCIAL

## 2023-06-12 ENCOUNTER — TELEPHONE (OUTPATIENT)
Dept: ENDOCRINOLOGY | Facility: CLINIC | Age: 43
End: 2023-06-12
Payer: COMMERCIAL

## 2023-06-12 VITALS
OXYGEN SATURATION: 96 % | WEIGHT: 294.75 LBS | DIASTOLIC BLOOD PRESSURE: 78 MMHG | HEART RATE: 65 BPM | HEIGHT: 74 IN | SYSTOLIC BLOOD PRESSURE: 122 MMHG | BODY MASS INDEX: 37.83 KG/M2

## 2023-06-12 DIAGNOSIS — Z13.1 SCREENING FOR DIABETES MELLITUS: Primary | ICD-10-CM

## 2023-06-12 DIAGNOSIS — R53.83 FATIGUE, UNSPECIFIED TYPE: ICD-10-CM

## 2023-06-12 DIAGNOSIS — R79.89 LOW TESTOSTERONE IN MALE: ICD-10-CM

## 2023-06-12 PROCEDURE — 99999 PR PBB SHADOW E&M-EST. PATIENT-LVL IV: ICD-10-PCS | Mod: PBBFAC,,, | Performed by: INTERNAL MEDICINE

## 2023-06-12 PROCEDURE — 99999 PR PBB SHADOW E&M-EST. PATIENT-LVL IV: CPT | Mod: PBBFAC,,, | Performed by: INTERNAL MEDICINE

## 2023-06-12 PROCEDURE — 99204 OFFICE O/P NEW MOD 45 MIN: CPT | Mod: S$GLB,,, | Performed by: INTERNAL MEDICINE

## 2023-06-12 PROCEDURE — 99204 PR OFFICE/OUTPT VISIT, NEW, LEVL IV, 45-59 MIN: ICD-10-PCS | Mod: S$GLB,,, | Performed by: INTERNAL MEDICINE

## 2023-06-12 RX ORDER — POLYETHYLENE GLYCOL-3350 AND ELECTROLYTES WITH FLAVOR PACK 240; 5.84; 2.98; 6.72; 22.72 G/278.26G; G/278.26G; G/278.26G; G/278.26G; G/278.26G
4000 POWDER, FOR SOLUTION ORAL ONCE
COMMUNITY
Start: 2023-06-06 | End: 2023-06-21

## 2023-06-12 NOTE — TELEPHONE ENCOUNTER
Called patient to get rescheduled for appt, pt scheduled for October and is added to the waitlist.

## 2023-06-12 NOTE — TELEPHONE ENCOUNTER
----- Message from Laura Aviles sent at 6/12/2023  9:03 AM CDT -----  Regarding: appt  Contact: [phone]  Pt would like to reschedule appt 6/12 please call for scheduling.

## 2023-06-12 NOTE — ASSESSMENT & PLAN NOTE
Reviewed history and prior labs.  In 2019 total testosterone level was in the low 200s.  Has never been on therapy.  Multiple risk factors for low testosterone including obesity and possible undiagnosed sleep apnea (sleep study pending).  No concerning medications noted which may be a factor.    Plan:  - Check Testosterone panel (fasting 8 am blood draw)  - Check FSH, LH, Prolactin  - Check CBC for screening purposes and baseline hematocrit before any treatment start    - When labs return we will review treatment options if hypogonadism is confirmed.  Can consider either oral formulation of referral to urology at that time for testosterone pellets.  Ideally sleep study will be completed as sleep apnea can worsen if testosterone therapy is started.  No symptoms of lower urinary tract conditions and no prostate cancer history to suggest checking PSA at this time.   - Tentative plan for 6 months follow up visit

## 2023-06-12 NOTE — PROGRESS NOTES
ENDOCRINOLOGY FOLLOW UP VISIT: 06/12/2023    Subjective:      Patient ID: Danilo Zambrano is a 43 y.o. male.    Chief Complaint:  Hypogonadism (Low testosterone)    History of Present Illness  Danilo Zambrano presents today for evaluation of hypogonadism, sent by his PCP.  He admits to being told in the past that he had low testosterone levels but was never placed on therapy for this.  He has gained 90 lbs during COVID over the prior years.  He is being told that he needs to lose weight or his insurance premiums will be higher.  He works in software and sits all day and has been having some pain in his lower back/side.  He does not sleep much, 4 hours on average.  He snores but has not been seen to be apneic before.  He has an upcoming sleep study in September.  He has 2 children now with his wife currently pregnant with his third.  No plans for any other children and he is considering vasectomy but has not decided on this yet.      Regarding Hypogonadism.     - Seen on labs since 2019, no recent repeats  - Non-specific symptoms as below  - He has never been on therapy for testosterone (if confirmed on labs he prefers to avoid shots and also given small children at home he is wanting to avoid topical)    Lab Results   Component Value Date    HCT 44.6 11/18/2021    HCT 45.6 07/17/2020    HCT 43.2 06/19/2019    TOTALTESTOST 235 (L) 07/02/2019     Lab Results   Component Value Date    CHOL 137 08/12/2022    TRIG 136 08/12/2022    HDL 41 08/12/2022    LDLCALC 68.8 08/12/2022    CHOLHDL 29.9 08/12/2022     Body mass index is 37.84 kg/m².     - Current therapy: None    - Current symptoms:    No   Yes  [x]    []  Decrease or absence of morning erections  [x]    []  Low libido  [x]    []  Loss of body hair  [x]    []  Low bone mineral density (history of fragility fracture or osteoporosis)  []    [x]  Gynecomastia  [x]    []  Diminished size of testes  []    [x]  Fatigue  []    [x]  Depression  [x]    []  Anemia  []    " [x]  Reduced muscular strength  []    [x]  Increased fat mass    - Risk factors:    No   Yes  [x]    []  History of pituitary abnormality, surgery, or radiation  []    [x]  Obesity  [x]    []  Obstructive sleep apnea - pending sleep study  [x]    []  Radiation to the pelvic area  [x]    []  Chemotherapy  [x]    []  Chronic systemic illness (hepatic cirrhosis, chronic renal failure, or AIDS)  [x]    []  Family history of hypogonadism or testosterone therapy  [x]    []  Cryptorchidism, testicular torsion, or trauma  [x]    []  History of mumps as a child    - Contraindications:  No   Yes  [x]    []  History of erythrocytosis or venous thromboembolism  [x]    []  Coronary artery disease - did have elevated coronary calcium score  [x]    []  Benign prostatic hypertrophy or severe lower urinary tract symptoms  [x]    []  Prostate cancer  [x]    []  Breast cancer  [x]    []  Decompensated heart failure  [x]    []  Severe and untreated obstructive sleep apnea - is being worked up with a sleep study in the near future    No current exercise routine.  He used to run and do cardio.  He has a pool and will occasionally swim.      ROS:   As above    Objective:     /78 (BP Location: Right arm, Patient Position: Sitting, BP Method: Large (Automatic))   Pulse 65   Ht 6' 2" (1.88 m)   Wt 133.7 kg (294 lb 12.1 oz)   SpO2 96%   BMI 37.84 kg/m²   BP Readings from Last 3 Encounters:   06/12/23 122/78   06/06/23 (!) 142/92   05/17/23 119/80     Wt Readings from Last 1 Encounters:   06/12/23 0956 133.7 kg (294 lb 12.1 oz)     Body mass index is 37.84 kg/m².      Physical Exam  Vitals reviewed.   Constitutional:       General: He is not in acute distress.     Appearance: He is obese.   HENT:      Head: Normocephalic.   Eyes:      Extraocular Movements: Extraocular movements intact.   Cardiovascular:      Rate and Rhythm: Normal rate.   Pulmonary:      Effort: Pulmonary effort is normal.   Genitourinary:     Penis: Normal.      "  Testes: Normal.      Comments: No abnormality noted of the penis or testicles on exam.  Testicles symmetric in size bilaterally between 20-25 mL.    Neurological:      General: No focal deficit present.      Mental Status: He is alert.   Psychiatric:         Mood and Affect: Mood normal.         Behavior: Behavior normal.        Lab Review:   Lab Results   Component Value Date    HGBA1C 5.5 11/18/2021     Lab Results   Component Value Date    CHOL 137 08/12/2022    HDL 41 08/12/2022    LDLCALC 68.8 08/12/2022    TRIG 136 08/12/2022    CHOLHDL 29.9 08/12/2022     Lab Results   Component Value Date     05/09/2023    K 4.1 05/09/2023     05/09/2023    CO2 26 05/09/2023    GLU 91 05/09/2023    BUN 11 05/09/2023    CREATININE 0.9 05/09/2023    CALCIUM 9.4 05/09/2023    PROT 7.4 05/09/2023    ALBUMIN 4.0 05/09/2023    BILITOT 1.0 05/09/2023    ALKPHOS 82 05/09/2023    AST 40 05/09/2023    ALT 68 (H) 05/09/2023    ANIONGAP 8 05/09/2023    ESTGFRAFRICA >60.0 11/18/2021    EGFRNONAA >60.0 11/18/2021    TSH 1.716 11/18/2021     Vit D, 25-Hydroxy   Date Value Ref Range Status   06/19/2019 11 (L) 30 - 96 ng/mL Final     Comment:     Vitamin D deficiency.........<10 ng/mL                              Vitamin D insufficiency......10-29 ng/mL       Vitamin D sufficiency........> or equal to 30 ng/mL  Vitamin D toxicity............>100 ng/mL         Assessment and Plan     Low testosterone in male  Reviewed history and prior labs.  In 2019 total testosterone level was in the low 200s.  Has never been on therapy.  Multiple risk factors for low testosterone including obesity and possible undiagnosed sleep apnea (sleep study pending).  No concerning medications noted which may be a factor.    Plan:  - Check Testosterone panel (fasting 8 am blood draw)  - Check FSH, LH, Prolactin  - Check CBC for screening purposes and baseline hematocrit before any treatment start    - When labs return we will review treatment options if  hypogonadism is confirmed.  Can consider either oral formulation of referral to urology at that time for testosterone pellets.  Ideally sleep study will be completed as sleep apnea can worsen if testosterone therapy is started.  No symptoms of lower urinary tract conditions and no prostate cancer history to suggest checking PSA at this time.   - Tentative plan for 6 months follow up visit    Screening for diabetes mellitus  With history of diabetes in his family and obesity we will screen with an A1c level at this time.  May be a candidate for GLP-1 therapy if diabetic which should help with weight loss.      Fatigue  Non-specific symptoms of fatigue.  Possibly related to history of hypogonadism.  Labs planned as listed.  Sleep study as planned in September.      RTC in 6 months pending above labs    Ayo Perez DO  [facility], 6th Floor  [address] [place], [place], [address]    Office: [phone]  Fax: [phone]

## 2023-06-12 NOTE — PATIENT INSTRUCTIONS
We will check labs at this time to look for your testosterone levels in addition to thyroid levels, blood counts, diabetes screen and any other causes of low testosterone.  When we have these results we will reach out further with treatment options for yourself.      Medicine options to consider for Obesity medicine include Wegovy, Mounjaro and Ozempic.

## 2023-06-12 NOTE — ASSESSMENT & PLAN NOTE
Non-specific symptoms of fatigue.  Possibly related to history of hypogonadism.  Labs planned as listed.  Sleep study as planned in September.

## 2023-06-12 NOTE — ASSESSMENT & PLAN NOTE
With history of diabetes in his family and obesity we will screen with an A1c level at this time.  May be a candidate for GLP-1 therapy if diabetic which should help with weight loss.

## 2023-06-13 ENCOUNTER — LAB VISIT (OUTPATIENT)
Dept: LAB | Facility: HOSPITAL | Age: 43
End: 2023-06-13
Payer: COMMERCIAL

## 2023-06-13 DIAGNOSIS — R79.89 LOW TESTOSTERONE IN MALE: ICD-10-CM

## 2023-06-13 DIAGNOSIS — Z13.1 SCREENING FOR DIABETES MELLITUS: ICD-10-CM

## 2023-06-13 DIAGNOSIS — R53.83 FATIGUE, UNSPECIFIED TYPE: ICD-10-CM

## 2023-06-13 LAB
BASOPHILS # BLD AUTO: 0.03 K/UL (ref 0–0.2)
BASOPHILS NFR BLD: 0.4 % (ref 0–1.9)
DIFFERENTIAL METHOD: ABNORMAL
EOSINOPHIL # BLD AUTO: 0.2 K/UL (ref 0–0.5)
EOSINOPHIL NFR BLD: 2.4 % (ref 0–8)
ERYTHROCYTE [DISTWIDTH] IN BLOOD BY AUTOMATED COUNT: 13.4 % (ref 11.5–14.5)
ESTIMATED AVG GLUCOSE: 114 MG/DL (ref 68–131)
FSH SERPL-ACNC: 3.41 MIU/ML (ref 0.95–11.95)
HBA1C MFR BLD: 5.6 % (ref 4–5.6)
HCT VFR BLD AUTO: 44.8 % (ref 40–54)
HGB BLD-MCNC: 14.2 G/DL (ref 14–18)
IMM GRANULOCYTES # BLD AUTO: 0.03 K/UL (ref 0–0.04)
IMM GRANULOCYTES NFR BLD AUTO: 0.4 % (ref 0–0.5)
LH SERPL-ACNC: 3.9 MIU/ML (ref 0.6–12.1)
LYMPHOCYTES # BLD AUTO: 2.9 K/UL (ref 1–4.8)
LYMPHOCYTES NFR BLD: 37.8 % (ref 18–48)
MCH RBC QN AUTO: 27.6 PG (ref 27–31)
MCHC RBC AUTO-ENTMCNC: 31.7 G/DL (ref 32–36)
MCV RBC AUTO: 87 FL (ref 82–98)
MONOCYTES # BLD AUTO: 0.6 K/UL (ref 0.3–1)
MONOCYTES NFR BLD: 7.3 % (ref 4–15)
NEUTROPHILS # BLD AUTO: 4 K/UL (ref 1.8–7.7)
NEUTROPHILS NFR BLD: 51.7 % (ref 38–73)
NRBC BLD-RTO: 0 /100 WBC
PLATELET # BLD AUTO: 215 K/UL (ref 150–450)
PMV BLD AUTO: 10.3 FL (ref 9.2–12.9)
PROLACTIN SERPL IA-MCNC: 25.2 NG/ML (ref 3.5–19.4)
RBC # BLD AUTO: 5.14 M/UL (ref 4.6–6.2)
TSH SERPL DL<=0.005 MIU/L-ACNC: 2.19 UIU/ML (ref 0.4–4)
WBC # BLD AUTO: 7.76 K/UL (ref 3.9–12.7)

## 2023-06-13 PROCEDURE — 83036 HEMOGLOBIN GLYCOSYLATED A1C: CPT | Performed by: STUDENT IN AN ORGANIZED HEALTH CARE EDUCATION/TRAINING PROGRAM

## 2023-06-13 PROCEDURE — 83001 ASSAY OF GONADOTROPIN (FSH): CPT | Performed by: STUDENT IN AN ORGANIZED HEALTH CARE EDUCATION/TRAINING PROGRAM

## 2023-06-13 PROCEDURE — 84443 ASSAY THYROID STIM HORMONE: CPT | Performed by: STUDENT IN AN ORGANIZED HEALTH CARE EDUCATION/TRAINING PROGRAM

## 2023-06-13 PROCEDURE — 84146 ASSAY OF PROLACTIN: CPT | Performed by: STUDENT IN AN ORGANIZED HEALTH CARE EDUCATION/TRAINING PROGRAM

## 2023-06-13 PROCEDURE — 83002 ASSAY OF GONADOTROPIN (LH): CPT | Performed by: STUDENT IN AN ORGANIZED HEALTH CARE EDUCATION/TRAINING PROGRAM

## 2023-06-13 PROCEDURE — 84270 ASSAY OF SEX HORMONE GLOBUL: CPT | Performed by: STUDENT IN AN ORGANIZED HEALTH CARE EDUCATION/TRAINING PROGRAM

## 2023-06-13 PROCEDURE — 85025 COMPLETE CBC W/AUTO DIFF WBC: CPT | Performed by: STUDENT IN AN ORGANIZED HEALTH CARE EDUCATION/TRAINING PROGRAM

## 2023-06-13 PROCEDURE — 36415 COLL VENOUS BLD VENIPUNCTURE: CPT | Performed by: STUDENT IN AN ORGANIZED HEALTH CARE EDUCATION/TRAINING PROGRAM

## 2023-06-13 PROCEDURE — 84403 ASSAY OF TOTAL TESTOSTERONE: CPT | Performed by: STUDENT IN AN ORGANIZED HEALTH CARE EDUCATION/TRAINING PROGRAM

## 2023-06-15 ENCOUNTER — TELEPHONE (OUTPATIENT)
Dept: HEPATOLOGY | Facility: CLINIC | Age: 43
End: 2023-06-15
Payer: COMMERCIAL

## 2023-06-15 NOTE — TELEPHONE ENCOUNTER
Returned call to patient to schedule a hepatology consult from referral.  Patient confirmed and agreed with the schedule.  Reminder letter mailed.

## 2023-06-15 NOTE — TELEPHONE ENCOUNTER
----- Message from Princess Andrea sent at 5/22/2023  9:02 AM CDT -----  Regarding: Scheduling  Patient has a referral from Dr. Marie for Elevated LFTs [R79.89]. Please call patient to assist with scheduling.    Thank you

## 2023-06-16 ENCOUNTER — ANESTHESIA EVENT (OUTPATIENT)
Dept: ENDOSCOPY | Facility: HOSPITAL | Age: 43
End: 2023-06-16
Payer: COMMERCIAL

## 2023-06-16 ENCOUNTER — HOSPITAL ENCOUNTER (OUTPATIENT)
Facility: HOSPITAL | Age: 43
Discharge: HOME | End: 2023-06-16
Attending: COLON & RECTAL SURGERY | Admitting: COLON & RECTAL SURGERY
Payer: COMMERCIAL

## 2023-06-16 ENCOUNTER — ANESTHESIA (OUTPATIENT)
Dept: ENDOSCOPY | Facility: HOSPITAL | Age: 43
End: 2023-06-16
Payer: COMMERCIAL

## 2023-06-16 VITALS
WEIGHT: 280 LBS | HEART RATE: 18 BPM | OXYGEN SATURATION: 99 % | SYSTOLIC BLOOD PRESSURE: 107 MMHG | HEIGHT: 74 IN | DIASTOLIC BLOOD PRESSURE: 61 MMHG | TEMPERATURE: 98 F | BODY MASS INDEX: 35.94 KG/M2 | RESPIRATION RATE: 64 BRPM

## 2023-06-16 DIAGNOSIS — K62.5 RECTAL BLEEDING: Primary | ICD-10-CM

## 2023-06-16 PROCEDURE — G0121 COLON CA SCRN NOT HI RSK IND: HCPCS | Mod: ,,, | Performed by: COLON & RECTAL SURGERY

## 2023-06-16 PROCEDURE — 25000003 PHARM REV CODE 250: Performed by: NURSE ANESTHETIST, CERTIFIED REGISTERED

## 2023-06-16 PROCEDURE — G0121 COLON CA SCRN NOT HI RSK IND: ICD-10-PCS | Mod: ,,, | Performed by: COLON & RECTAL SURGERY

## 2023-06-16 PROCEDURE — G0121 COLON CA SCRN NOT HI RSK IND: HCPCS | Performed by: COLON & RECTAL SURGERY

## 2023-06-16 PROCEDURE — 37000009 HC ANESTHESIA EA ADD 15 MINS: Performed by: COLON & RECTAL SURGERY

## 2023-06-16 PROCEDURE — E9220 PRA ENDO ANESTHESIA: HCPCS | Mod: ,,, | Performed by: NURSE ANESTHETIST, CERTIFIED REGISTERED

## 2023-06-16 PROCEDURE — 25000003 PHARM REV CODE 250: Performed by: COLON & RECTAL SURGERY

## 2023-06-16 PROCEDURE — 37000008 HC ANESTHESIA 1ST 15 MINUTES: Performed by: COLON & RECTAL SURGERY

## 2023-06-16 PROCEDURE — E9220 PRA ENDO ANESTHESIA: ICD-10-PCS | Mod: ,,, | Performed by: NURSE ANESTHETIST, CERTIFIED REGISTERED

## 2023-06-16 PROCEDURE — 63600175 PHARM REV CODE 636 W HCPCS: Performed by: NURSE ANESTHETIST, CERTIFIED REGISTERED

## 2023-06-16 RX ORDER — PROPOFOL 10 MG/ML
VIAL (ML) INTRAVENOUS CONTINUOUS PRN
Status: DISCONTINUED | OUTPATIENT
Start: 2023-06-16 | End: 2023-06-16

## 2023-06-16 RX ORDER — SODIUM CHLORIDE 9 MG/ML
INJECTION, SOLUTION INTRAVENOUS CONTINUOUS
Status: DISCONTINUED | OUTPATIENT
Start: 2023-06-16 | End: 2023-06-16 | Stop reason: HOSPADM

## 2023-06-16 RX ORDER — LIDOCAINE HYDROCHLORIDE 20 MG/ML
INJECTION INTRAVENOUS
Status: DISCONTINUED | OUTPATIENT
Start: 2023-06-16 | End: 2023-06-16

## 2023-06-16 RX ORDER — PROPOFOL 10 MG/ML
VIAL (ML) INTRAVENOUS
Status: DISCONTINUED | OUTPATIENT
Start: 2023-06-16 | End: 2023-06-16

## 2023-06-16 RX ADMIN — LIDOCAINE HYDROCHLORIDE 100 MG: 20 INJECTION INTRAVENOUS at 11:06

## 2023-06-16 RX ADMIN — Medication 200 MCG/KG/MIN: at 11:06

## 2023-06-16 RX ADMIN — SODIUM CHLORIDE: 0.9 INJECTION, SOLUTION INTRAVENOUS at 11:06

## 2023-06-16 RX ADMIN — PROPOFOL 100 MG: 10 INJECTION, EMULSION INTRAVENOUS at 11:06

## 2023-06-16 NOTE — PLAN OF CARE
To procedure rm 5 via stretcher. All vital signs, medications, IV fluids and sedation per CRNA. See anesthesia notes. Pt's belongings under stretcher

## 2023-06-16 NOTE — ANESTHESIA PREPROCEDURE EVALUATION
06/16/2023  Danilo Zambrano is a 43 y.o., male.      Pre-op Assessment    I have reviewed the Patient Summary Reports.     I have reviewed the Nursing Notes. I have reviewed the NPO Status.   I have reviewed the Medications.     Review of Systems  Anesthesia Hx:  No problems with previous Anesthesia  Denies Family Hx of Anesthesia complications.   Denies Personal Hx of Anesthesia complications.   Hematology/Oncology:  Hematology Normal   Oncology Normal     EENT/Dental:EENT/Dental Normal   Cardiovascular:   Exercise tolerance: good Angina Neg cath/ history of chest pain    Pulmonary:  Pulmonary Normal    Renal/:  Renal/ Normal     Hepatic/GI:  Hepatic/GI Normal    Musculoskeletal:  Musculoskeletal Normal    Neurological:  Neurology Normal    Endocrine:  Endocrine Normal    Psych:  Psychiatric Normal           Physical Exam  General: Well nourished, Cooperative and Alert    Airway:  Mallampati: III / II  Mouth Opening: Normal  TM Distance: Normal  Tongue: Normal  Neck ROM: Normal ROM    Dental:  Intact    Chest/Lungs:  Clear to auscultation, Normal Respiratory Rate    Heart:  Rate: Normal  Rhythm: Regular Rhythm       Past Surgical History:   Procedure Laterality Date    cardiac cath  07/03/2017    stable angina, Dr. Dobbins    NASAL SEPTUM SURGERY      TONSILLECTOMY       Past Medical History:   Diagnosis Date    Genital herpes     Stable angina 07/03/2017         Anesthesia Plan  Type of Anesthesia, risks & benefits discussed:    Anesthesia Type: Gen Natural Airway  Intra-op Monitoring Plan: Standard ASA Monitors  Post Op Pain Control Plan: multimodal analgesia  Induction:  IV  Informed Consent: Informed consent signed with the Patient and all parties understand the risks and agree with anesthesia plan.  All questions answered.   ASA Score: 2  Day of Surgery Review of History & Physical: H&P  Update referred to the surgeon/provider.    Ready For Surgery From Anesthesia Perspective.     .

## 2023-06-16 NOTE — TRANSFER OF CARE
"Anesthesia Transfer of Care Note    Patient: Danilo Zambrano    Procedure(s) Performed: Procedure(s) (LRB):  COLONOSCOPY (N/A)    Patient location: GI    Anesthesia Type: general    Transport from OR: Transported from OR on room air with adequate spontaneous ventilation    Post pain: adequate analgesia    Post assessment: no apparent anesthetic complications and tolerated procedure well    Post vital signs: stable    Level of consciousness: awake, alert and oriented    Nausea/Vomiting: no nausea/vomiting    Complications: none    Transfer of care protocol was followed      Last vitals:   Visit Vitals  /72 (BP Location: Left arm, Patient Position: Lying)   Pulse 78   Temp 36.8 °C (98.2 °F) (Temporal)   Resp 18   Ht 6' 2" (1.88 m)   Wt 127 kg (280 lb)   SpO2 96%   BMI 35.95 kg/m²     "

## 2023-06-16 NOTE — PROVATION PATIENT INSTRUCTIONS
Discharge Summary/Instructions after an Endoscopic Procedure  Patient Name: Danilo Zambrano  Patient MRN: [id number]  Patient   YOB: 1980 Friday, June 16, 2023  Isaac Reyez MD  Dear patient,  As a result of recent federal legislation (The Federal Cures Act), you may   receive lab or pathology results from your procedure in your [facility]   account before your physician is able to contact you. Your physician or   their representative will relay the results to you with their   recommendations at their soonest availability.  Thank you,  RESTRICTIONS:  During your procedure today, you received medications for sedation.  These   medications may affect your judgment, balance and coordination.  Therefore,   for 24 hours, you have the following restrictions:   - DO NOT drive a car, operate machinery, make legal/financial decisions,   sign important papers or drink alcohol.    ACTIVITY:  Today: no heavy lifting, straining or running due to procedural   sedation/anesthesia.  The following day: return to full activity including work.  DIET:  Eat and drink normally unless instructed otherwise.     TREATMENT FOR COMMON SIDE EFFECTS:  - Mild abdominal pain, nausea, belching, bloating or excessive gas:  rest,   eat lightly and use a heating pad.  - Sore Throat: treat with throat lozenges and/or gargle with warm salt   water.  - Because air was used during the procedure, expelling large amounts of air   from your rectum or belching is normal.  - If a bowel prep was taken, you may not have a bowel movement for 1-3 days.    This is normal.  SYMPTOMS TO WATCH FOR AND REPORT TO YOUR PHYSICIAN:  1. Abdominal pain or bloating, other than gas cramps.  2. Chest pain.  3. Back pain.  4. Signs of infection such as: chills or fever occurring within 24 hours   after the procedure.  5. Rectal bleeding, which would show as bright red, maroon, or black stools.   (A tablespoon of blood from the rectum is not serious, especially  if   hemorrhoids are present.)  6. Vomiting.  7. Weakness or dizziness.  GO DIRECTLY TO THE NEAREST EMERGENCY ROOM IF YOU HAVE ANY OF THE FOLLOWING:      Difficulty breathing              Chills and/or fever over 101 F   Persistent vomiting and/or vomiting blood   Severe abdominal pain   Severe chest pain   Black, tarry stools   Bleeding- more than one tablespoon   Any other symptom or condition that you feel may need urgent attention  Your doctor recommends these additional instructions:  If any biopsies were taken, your doctors clinic will contact you in 1 to 2   weeks with any results.  - Discharge patient to home (ambulatory).   - Patient has a contact number available for emergencies.  The signs and   symptoms of potential delayed complications were discussed with the   patient.  Return to normal activities tomorrow.  Written discharge   instructions were provided to the patient.   - Resume previous diet.   - Continue present medications.   - Return to primary care physician as previously scheduled.   - Repeat colonoscopy in 10 years for screening purposes.  For questions, problems or results please call your physician - Isaac Reyez MD at Work:  [phone].  [facility], EMERGENCY ROOM PHONE NUMBER: [phone]  IF A COMPLICATION OR EMERGENCY SITUATION ARISES AND YOU ARE UNABLE TO REACH   YOUR PHYSICIAN - GO DIRECTLY TO THE EMERGENCY ROOM.  Isaac Reyez MD  6/16/2023 12:10:41 PM  This report has been verified and signed electronically.  Dear patient,  As a result of recent federal legislation (The Federal Cures Act), you may   receive lab or pathology results from your procedure in your [facility]   account before your physician is able to contact you. Your physician or   their representative will relay the results to you with their   recommendations at their soonest availability.  Thank you,  PROVATION

## 2023-06-16 NOTE — H&P
COLONOSCOPY HISTORY AND PHYSICAL EXAM    Procedure : Colonoscopy      INDICATIONS: intermittent rectal bleeding    Family Hx of CRC: none       Past Medical History:   Diagnosis Date    Genital herpes     Stable angina 07/03/2017     Sedation Problems: No  Family History   Problem Relation Age of Onset    Heart disease Father         40s    Heart disease Paternal Uncle     Heart disease Maternal Grandfather     Heart disease Paternal Grandfather      Fam Hx of Sedation Problems: No  Social History     Socioeconomic History    Marital status:    Tobacco Use    Smoking status: Never    Smokeless tobacco: Never   Substance and Sexual Activity    Alcohol use: Yes     Comment: one a month    Drug use: No    Sexual activity: Yes     Social Determinants of Health     Financial Resource Strain: Unknown    Difficulty of Paying Living Expenses: Patient refused   Food Insecurity: Unknown    Worried About Running Out of Food in the Last Year: Patient refused    Ran Out of Food in the Last Year: Patient refused   Transportation Needs: Unknown    Lack of Transportation (Medical): Patient refused    Lack of Transportation (Non-Medical): Patient refused   Physical Activity: Unknown    Days of Exercise per Week: Patient refused    Minutes of Exercise per Session: 0 min   Stress: Unknown    Feeling of Stress : Patient refused   Social Connections: Unknown    Frequency of Communication with Friends and Family: Patient refused    Frequency of Social Gatherings with Friends and Family: Patient refused    Active Member of Clubs or Organizations: Patient refused    Attends Club or Organization Meetings: Patient refused    Marital Status: Patient refused   Housing Stability: Unknown    Unable to Pay for Housing in the Last Year: Patient refused    Number of Places Lived in the Last Year: 1    Unstable Housing in the Last Year: Patient refused       Review of Systems - Negative except   Respiratory ROS: no dyspnea  Cardiovascular  "ROS: no exertional chest pain  Gastrointestinal ROS: NO abdominal discomfort,  YES intermittent rectal bleeding  Musculoskeletal ROS: no muscular pain  Neurological ROS: no recent stroke    Physical Exam:  BP (!) 146/77 (BP Location: Left arm, Patient Position: Lying)   Pulse 61   Temp 98.2 °F (36.8 °C)   Resp 16   Ht 6' 2" (1.88 m)   Wt 127 kg (280 lb)   SpO2 97%   BMI 35.95 kg/m²   General: no distress  Head: normocephalic  Mallampati Score   Neck: supple, symmetrical, trachea midline  Lungs:  normal respiratory effort  Heart: regular rate and rhythm  Abdomen: soft, non-tender non-distented; bowel sounds normal; no masses,  no organomegaly  Extremities: no cyanosis or edema, or clubbing    ASA:  II    PLAN  COLONOSCOPY.    SedationPlan :MAC    The details of the procedure, the possible need for biopsy or polypectomy and the potential risks including bleeding, perforation, missed polyps were discussed in detail.     "

## 2023-06-16 NOTE — ANESTHESIA POSTPROCEDURE EVALUATION
Anesthesia Post Evaluation    Patient: Danilo Zambrano    Procedure(s) Performed: Procedure(s) (LRB):  COLONOSCOPY (N/A)    Final Anesthesia Type: general      Patient location during evaluation: GI PACU  Patient participation: Yes- Able to Participate  Level of consciousness: awake and alert and oriented  Post-procedure vital signs: reviewed and stable  Pain management: adequate  Airway patency: patent    PONV status at discharge: No PONV  Anesthetic complications: no      Cardiovascular status: hemodynamically stable  Respiratory status: unassisted, spontaneous ventilation and room air  Hydration status: euvolemic  Follow-up not needed.          Vitals Value Taken Time   /61 06/16/23 1238   Temp 36.8 °C (98.2 °F) 06/16/23 1219   Pulse 18 06/16/23 1238   Resp 64 06/16/23 1238   SpO2 99 % 06/16/23 1238         Event Time   Out of Recovery 12:47:26         Pain/Noemy Score: Noemy Score: 10 (6/16/2023 12:22 PM)

## 2023-06-21 ENCOUNTER — OFFICE VISIT (OUTPATIENT)
Dept: HEPATOLOGY | Facility: CLINIC | Age: 43
End: 2023-06-21
Payer: COMMERCIAL

## 2023-06-21 VITALS — WEIGHT: 291.88 LBS | HEIGHT: 74 IN | BODY MASS INDEX: 37.46 KG/M2

## 2023-06-21 DIAGNOSIS — E66.9 OBESITY (BMI 30-39.9): ICD-10-CM

## 2023-06-21 DIAGNOSIS — K76.0 FATTY LIVER: Primary | ICD-10-CM

## 2023-06-21 DIAGNOSIS — R79.89 ELEVATED FERRITIN: ICD-10-CM

## 2023-06-21 DIAGNOSIS — R74.8 ELEVATED LIVER ENZYMES: ICD-10-CM

## 2023-06-21 DIAGNOSIS — R16.2 HEPATOSPLENOMEGALY: ICD-10-CM

## 2023-06-21 DIAGNOSIS — R79.89 ELEVATED LFTS: ICD-10-CM

## 2023-06-21 DIAGNOSIS — K74.00 LIVER FIBROSIS: ICD-10-CM

## 2023-06-21 LAB
ALBUMIN SERPL-MCNC: 4.2 G/DL (ref 3.6–5.1)
SHBG SERPL-SCNC: 28 NMOL/L (ref 10–50)
TESTOST FREE SERPL-MCNC: 44.4 PG/ML (ref 46–224)
TESTOST SERPL-MCNC: 301 NG/DL (ref 250–1100)
TESTOSTERONE.FREE+WB SERPL-MCNC: 85.5 NG/DL (ref 110–575)

## 2023-06-21 PROCEDURE — 99999 PR PBB SHADOW E&M-EST. PATIENT-LVL III: ICD-10-PCS | Mod: PBBFAC,,, | Performed by: NURSE PRACTITIONER

## 2023-06-21 PROCEDURE — 99999 PR PBB SHADOW E&M-EST. PATIENT-LVL III: CPT | Mod: PBBFAC,,, | Performed by: NURSE PRACTITIONER

## 2023-06-21 PROCEDURE — 99203 OFFICE O/P NEW LOW 30 MIN: CPT | Mod: S$GLB,,, | Performed by: NURSE PRACTITIONER

## 2023-06-21 PROCEDURE — 99203 PR OFFICE/OUTPT VISIT, NEW, LEVL III, 30-44 MIN: ICD-10-PCS | Mod: S$GLB,,, | Performed by: NURSE PRACTITIONER

## 2023-06-21 NOTE — PROGRESS NOTES
[facility] VISIT NEW PT NOTE    REFERRING PROVIDER:  Dr. Kane Marie  PCP: Zheng Dobbins MD, MD (Inactive)     CHIEF COMPLAINT: fatty liver, liver fibrosis, elevated liver enzymes     HPI: This is a 43 y.o. male with PMH noted below, presenting for evaluation of  fatty liver disease     Previous serologic w/u negative for Azeem's, alpha-1 antitrypsin deficiency, autoimmune etiology, and viral hepatitis A, B and C  Markedly elevated ferritin in 2019, low iron - needs repeat     Prior serologic workup:   Lab Results   Component Value Date    SMOOTHMUSCAB Negative 1:40 07/02/2019    AMAIFA Negative 1:40 07/02/2019    IGGSERUM 989 07/02/2019    FERRITIN 2,274 (H) 07/02/2019    FESATURATED 15 (L) 07/02/2019    CERULOPLSM 45.0 07/02/2019    HEPBSAG Negative 06/25/2019    HEPCAB Negative 06/25/2019    HEPAIGM Negative 06/25/2019         Risk factors for fatty liver include obesity    Liver fibrosis staging:  -- fibroscan in 2019 noted F2, S3 (kPA 8.1, )    Interval HPI: Presents today alone.   100 lb weight gain in the past ~5+ years, 80 lbs since covid  No SSB  Seen in hepatology in 2019, new to me today   No dietary restrictions  No exercise    Labs done 5/2023 show elevated transaminase levels (ALT > AST, elevated since at least 2019)  Platelets WNL, alk phos WNL  Synthetic liver functioning WNL    Lab Results   Component Value Date    ALT 68 (H) 05/09/2023    AST 40 05/09/2023    ALKPHOS 82 05/09/2023    BILITOT 1.0 05/09/2023    ALBUMIN 4.0 05/09/2023    INR 1.0 07/17/2020     06/13/2023       Abd U/S done in 2019 showed hepatosplenomegaly, fatty liver, focal fatty sparing, cyst - will repeat     Denies family history of liver disease . Rare Alcohol consumption, see below  Social History     Substance and Sexual Activity   Alcohol Use Yes    Comment: once a month max       Immunity to Hep A and B - will check with next labs          Allergy and medication list reviewed and updated  "    PMHX:  has a past medical history of Genital herpes and Stable angina (07/03/2017).    PSHX:  has a past surgical history that includes Nasal septum surgery; cardiac cath (07/03/2017); Tonsillectomy; and Colonoscopy (N/A, 6/16/2023).    FAMILY HISTORY: Updated and reviewed in [facility]    SOCIAL HISTORY:   Social History     Substance and Sexual Activity   Alcohol Use Yes    Comment: one a month       Social History     Substance and Sexual Activity   Drug Use No       ROS:   GENERAL: Denies fatigue  CARDIOVASCULAR: Denies edema  GI: Denies abdominal pain  SKIN: Denies rash, itching   NEURO: Denies confusion, memory loss, or mood changes    PHYSICAL EXAM:   Friendly White male, in no acute distress; alert and oriented to person, place and time  VITALS: Ht 6' 2" (1.88 m)   Wt 132.4 kg (291 lb 14.2 oz)   BMI 37.48 kg/m²   EYES: Sclerae anicteric  GI: Soft, non-tender, non-distended. No ascites.  EXTREMITIES:  No edema.  SKIN: Warm and dry. No jaundice. No telangectasias noted. No palmar erythema.  NEURO:  No asterixis.  PSYCH:  Thought and speech pattern appropriate. Behavior normal      EDUCATION:  See instructions discussed with patient in Instructions section of the After Visit Summary     ASSESSMENT & PLAN:  43 y.o. male with:  1.  Fatty liver, likely related to metabolic risk factors   - see HPI  -- Immunity to Hep A and B : see HPI  -- Fibroscan with RTC  -- Recommendations discussed with patient:  Limit alcohol consumption  2 Weight loss goal of 50-75 lbs  3. Low carb/sugar, high fiber and protein diet, limit your carb intake to LESS than 30-45 grams of carbs with a meal or LESS than 5-10 grams with any snack   4. Exercise, 5 days per week, 30 minutes per day, as tolerated  5. Recommend good cholesterol, blood pressure, blood sugar levels   6. Consider enrolling in [facility] fibrosis clinical trails - will determine based on liver fibrosis staging results     2. Liver fibrosis  -- F2 on fibroscan in 2019, will " repeat in August    3. Elevated liver enzymes, ferritin   -- sero w/u in HPI, will repeat ferritin     4. Obesity  -- Body mass index is 37.48 kg/m².   -- increases risk for fatty liver    5. Splenomegaly  -- concern for advanced fibrosis, will repeat US and fibroscan with RTC          Follow up in about 2 months (around 8/21/2023). with labs, US and fibroscan before  Orders Placed This Encounter   Procedures    FibroScan [place] (Vibration Controlled Transient Elastography)    US Abdomen Complete    Ferritin    Iron and TIBC    Hepatitis A antibody, IgG    Hepatitis B Core Antibody, Total    Hepatitis B Surface Ab, Qualitative    Hepatic Function Panel        Thank you for allowing me to participate in the care of MIS [facility]    I spent a total of 30 minutes on the day of the visit.This includes face to face time and non-face to face time preparing to see the patient (eg, review of tests), obtaining and/or reviewing separately obtained history, documenting clinical information in the electronic or other health record, independently interpreting results and communicating results to the patient/family/caregiver, and coordinating care.         CC'ed note to:   Kane Marie MD

## 2023-06-21 NOTE — PATIENT INSTRUCTIONS
1. Fibroscan to look for fat or scar tissue in the liver with return to clinic   2. Will check immunity markers for Hepatitis A and B and arrange for vaccination if needed  3. Labs before return to clinic to  check for multiple causes of liver disease. These labs will release to you as soon as they are resulted but we will discuss them in detail at your upcoming visit to discuss what the lab results mean.   4.  Follow up in 2 months with fibroscan same day     There is no FDA approved therapy for fatty liver disease. Therefore, these things are important:  Limit alcohol consumption, none until further notice   2 Weight loss goal of 50-75 lbs, referral for [facility] if interested. Also, if interested in a dietician visit to create a weight loss plan, contact the dietician team at [facility] at [email or web address] to schedule a visit to you can call [facility] in [place]: [phone] and the  will transfer the call to one of the dieticians to schedule an appointment. Or you can also call [phone] to schedule. They do offer video visits   3. Low carb/sugar and high protein diet (~1 g/kg/day of protein).Try to limit your carb intake to LESS than 30-45 grams of carbs with a meal or LESS than 5-10 grams with any snack (total of any snack foods eaten during that time). Use [employer] or Lose It odessa to add up your carbs through the day. Do NOT drink any beverages with calories or carbs (this can lead to high blood sugar and weight gain). Also, some of our patients have been very successful with weight loss using the pre made/planned meal planning services that limit calories and portion size  ( The main thing to look for is low calorie, high protein, low carb)  4. Exercise, 5 days per week, 30 minutes per day, as tolerated  5. Recommend good cholesterol, blood pressure, blood sugar levels     In some people, fatty liver can progress to steatohepatitis (inflamatory  fatty liver) and possibly to cirrhosis, putting one at increased risk for liver cancer, liver failure, and death. Therefore, the lifestyle changes are very important to decrease this risk.     Website with information about fatty liver and inflammation related to fatty liver ([facility]) = www.nashtruth.com  AND www.NASHactually.com

## 2023-06-22 ENCOUNTER — PATIENT MESSAGE (OUTPATIENT)
Dept: ENDOCRINOLOGY | Facility: CLINIC | Age: 43
End: 2023-06-22
Payer: COMMERCIAL

## 2023-06-27 ENCOUNTER — TELEPHONE (OUTPATIENT)
Dept: ENDOCRINOLOGY | Facility: HOSPITAL | Age: 43
End: 2023-06-27
Payer: COMMERCIAL

## 2023-06-27 DIAGNOSIS — R79.89 LOW TESTOSTERONE IN MALE: Primary | ICD-10-CM

## 2023-06-27 NOTE — TELEPHONE ENCOUNTER
I spoke with Levy Zambrano on the phone and reviewed his recent labs.  Concerns for testosterone levels, specifically free and bioavailable being low.  This is in setting of obesity and pending sleep study to evaluate for OLIVIA.  Discussed need to wait til sleep study is completed before decision on testosterone therapy.  He is leaning away from injections and topical therapies.      Also review prolactin lab which was elevated.  He has intermittent headaches, nothing regular, and some recent change in vision but nothing significant.  No breast tenderness or galactorrhea.  Discussed need to repeat labs in 2-3 weeks.  If elevated at that time MRI will be considered.  Prolactin elevation if confirmed may also be contributing to hypogonadism.      Prolactin in 3 weeks planned

## 2023-07-06 ENCOUNTER — PATIENT MESSAGE (OUTPATIENT)
Dept: INTERNAL MEDICINE | Facility: CLINIC | Age: 43
End: 2023-07-06
Payer: COMMERCIAL

## 2023-07-06 ENCOUNTER — PATIENT MESSAGE (OUTPATIENT)
Dept: SURGERY | Facility: CLINIC | Age: 43
End: 2023-07-06
Payer: COMMERCIAL

## 2023-07-06 ENCOUNTER — PATIENT MESSAGE (OUTPATIENT)
Dept: ENDOCRINOLOGY | Facility: CLINIC | Age: 43
End: 2023-07-06
Payer: COMMERCIAL

## 2023-07-06 ENCOUNTER — PATIENT MESSAGE (OUTPATIENT)
Dept: HEPATOLOGY | Facility: CLINIC | Age: 43
End: 2023-07-06
Payer: COMMERCIAL

## 2023-07-11 ENCOUNTER — TELEPHONE (OUTPATIENT)
Dept: SURGERY | Facility: CLINIC | Age: 43
End: 2023-07-11
Payer: COMMERCIAL

## 2023-07-13 ENCOUNTER — TELEPHONE (OUTPATIENT)
Dept: BARIATRICS | Facility: CLINIC | Age: 43
End: 2023-07-13
Payer: COMMERCIAL

## 2023-07-13 NOTE — TELEPHONE ENCOUNTER
----- Message from Zena Boyce sent at 7/13/2023  9:24 AM CDT -----  Regarding: Appt  Contact: Pt [phone]  Pt has spoke with financial dept and is ready to schedule appt with provider please call

## 2023-07-19 ENCOUNTER — LAB VISIT (OUTPATIENT)
Dept: LAB | Facility: HOSPITAL | Age: 43
End: 2023-07-19
Payer: COMMERCIAL

## 2023-07-19 DIAGNOSIS — R79.89 LOW TESTOSTERONE IN MALE: ICD-10-CM

## 2023-07-19 LAB — PROLACTIN SERPL IA-MCNC: 11.3 NG/ML (ref 3.5–19.4)

## 2023-07-19 PROCEDURE — 36415 COLL VENOUS BLD VENIPUNCTURE: CPT | Performed by: STUDENT IN AN ORGANIZED HEALTH CARE EDUCATION/TRAINING PROGRAM

## 2023-07-19 PROCEDURE — 84146 ASSAY OF PROLACTIN: CPT | Performed by: STUDENT IN AN ORGANIZED HEALTH CARE EDUCATION/TRAINING PROGRAM
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
## 2023-07-20 ENCOUNTER — TELEPHONE (OUTPATIENT)
Dept: PHARMACY | Facility: CLINIC | Age: 43
End: 2023-07-20
Payer: COMMERCIAL

## 2023-07-20 ENCOUNTER — OFFICE VISIT (OUTPATIENT)
Dept: BARIATRICS | Facility: CLINIC | Age: 43
End: 2023-07-20
Payer: COMMERCIAL

## 2023-07-20 VITALS
BODY MASS INDEX: 37.59 KG/M2 | OXYGEN SATURATION: 96 % | HEIGHT: 74 IN | WEIGHT: 292.88 LBS | DIASTOLIC BLOOD PRESSURE: 70 MMHG | SYSTOLIC BLOOD PRESSURE: 108 MMHG | HEART RATE: 66 BPM

## 2023-07-20 DIAGNOSIS — K76.0 FATTY LIVER: ICD-10-CM

## 2023-07-20 DIAGNOSIS — E66.01 CLASS 2 SEVERE OBESITY WITH BODY MASS INDEX (BMI) OF 35 TO 39.9 WITH SERIOUS COMORBIDITY: Primary | ICD-10-CM

## 2023-07-20 PROCEDURE — 99999 PR PBB SHADOW E&M-EST. PATIENT-LVL IV: ICD-10-PCS | Mod: PBBFAC,,, | Performed by: INTERNAL MEDICINE

## 2023-07-20 PROCEDURE — 99215 OFFICE O/P EST HI 40 MIN: CPT | Mod: S$GLB,,, | Performed by: INTERNAL MEDICINE

## 2023-07-20 PROCEDURE — 99215 PR OFFICE/OUTPT VISIT, EST, LEVL V, 40-54 MIN: ICD-10-PCS | Mod: S$GLB,,, | Performed by: INTERNAL MEDICINE

## 2023-07-20 PROCEDURE — 99999 PR PBB SHADOW E&M-EST. PATIENT-LVL IV: CPT | Mod: PBBFAC,,, | Performed by: INTERNAL MEDICINE

## 2023-07-20 RX ORDER — LIRAGLUTIDE 6 MG/ML
3 INJECTION, SOLUTION SUBCUTANEOUS DAILY
Qty: 15 ML | Refills: 3 | Status: SHIPPED | OUTPATIENT
Start: 2023-07-20 | End: 2023-10-09

## 2023-07-20 RX ORDER — PEN NEEDLE, DIABETIC 30 GX3/16"
NEEDLE, DISPOSABLE MISCELLANEOUS
Qty: 100 EACH | Refills: 1 | Status: SHIPPED | OUTPATIENT
Start: 2023-07-20 | End: 2023-10-09

## 2023-07-20 NOTE — PATIENT INSTRUCTIONS
"Start saxenda injection daily.     Follow dosing guide for the first 5 weeks.     https://www.[email or web address]"[employer].com/about-saxenda/dosing-schedule.html    Decrease portions as soon as you start Saxenda. Avoid fried, greasy or fatty foods. Some nausea in the first 6 weeks is not unusual.     If you get pain across the upper abdomen and around to your back, please call the office.       Exercise 30 min 3 days.     Increase low impact activity as tolerated.  Avoid high impact activity, very heavy lifting or other exercise regimens that may cause discomfort.     https://Trading Bloxcoursemeals.Appfolio, Www.[employer].Appfolio, https://Vaultive.com for meal options.       1800 calorie  Meal Plan  STARCHES 80 CALORIES PER SERVING 15g CARB, 3g PROTEIN, 1g FAT  Servings per day   bread   tortilla   crackers   cooked cereals   dry cereals   pasta   rice   corn   popcorn   potato (small)   potato, mashed   sweet potato   squash, winter   cooked beans, peas, lentils (add 1 meat exchange)   1 slice   1 (6")   4-6 (3/4 oz)   1/2 cup   3/4 cup   1/2 cup   1/3 cup  1/2 cup   1 small light bag  1 (3 oz)   1/2 cup   1/3 cup   1 cup   1/2 cup Most starches are a good source of B vitamins   Choose whole grain foods such as 100% whole wheat bread and flour, brown rice, tortillas, etc. for nutrients and fiber.   Combine beans (starch & meat) with grains (starch) for their complimentary proteins and fiber   Combine grains (starch) with milk (milk) or cheese (meat) to compliment proteins     5   FRUIT 60 CALORIES PER SERVING 15g CARB    fresh fruit   banana  melon (cubes)   berries  canned fruit   dried fruit    1 small   1/2  ½ cup   ¾ cup  ½ cup   ¼ cup    Choose whole fruits for fiber   No fruit juices   2   DAIRY  CALORIES PER SERVING 12g CARB, 8g PROTEIN, 0-8g FAT    milk   yogurt  Protein soy or almond milk 1 cup   1 cup   1 cup Use unsweetened almond or soy milk with added protein  Avoid chocolate or flavored milk  Avoid yogurt with more than 8 " gms of sugar. Gms of protein should be higher than grams of sugar               2   MEAT AND SUBSTITUES  CALORIES PER SERVING 7g Protein, 0-13g FAT    Seafood,meat and fish   cheese   cottage cheese   egg   peanut butter   tofu or tempeh  cooked beans, peas, lentils (add 1 starch)  Quinoa (add 1 starch)   Nuts and seeds (½ serving protein + 1 fat)  Nutritional yeast  Morning Star grillers 1 oz     1 oz  1/4 cup     1   1.5 Tbsp   4 oz (1/2 cup)   1/2 cup              ¼ cup            2 tablespoons    1 vane or ½ cup      Choose meat, fish, seafood and lower fat cheeses  Limit frying or adding fat.      13   FATS 45 CALORIES PER SERVING     oil   mayonnaise   cream cheese   salad dressing   peanuts   avocado   butter or margarine    1 tsp   1 tsp   1 Tbsp   1 Tbsp   10   1/8   1 tsp Eat less fat.   Eat less saturated fat such as animal fat found in fatter meat, cheese, and butter. Also eat less hydrogenated fat.      2-3   VEGETABLES 25 CALORIES PER SERVING 5 g CARB, 2g PROTEIN    raw vegetables   cooked vegetables   tomato or vegetable juice 1 cup   1/2 cup     1/2 cup Choose dark green leafy and deep yellow vegetables such as spinach, zuchinni, squash, mushrooms, cauliflower ,broccoli, carrots, and peppers.   Unlimited        1800 CALORIE MEAL PLAN  Eat 3 small meals per day with 1-2 small snacks to keep you full throughout the day  Aim for at least 15 gms of protein at breakfast, at least 25 grams at lunch and at least 25 grams of protein at dinner.  Snacks should contain at least 5 grams of protein  Limit starches to 1 serving per meal or snack.  Keep your carbs whole grain or whole wheat  Limit your intake of refined sugar including sugary beverages ie sweet tea, lemonade, fruit punch             Fruit Protein Dairy Starch Fats Calories   Breakfast 1 3 1 1  440   Lunch  4  2 1 470   Dinner 1 4  1 2 450   Snack  2 1 1  360   Total 2 11 2 5 3 1705     Sample breakfasts:  2 scrambled eggs (use spray), 1 cup  Protein Silk soy milk, 1 slice whole wheat toast, 1 small orange  Omelet made with 1 egg, 1-ounce low fat cheese and non-starchy veggies, 1 low fat plain yogurt with ½ banana  Plain yogurt with ¾ cup unsweetened cold cereal and ½ cup fresh fruit salad, 2 hardboiled eggs  Sample lunches:  ½ whole wheat bagel topped with 3 ounces of low fat cheese melted in oven with a wild green salad with 1 TBSP dressing  ¾ cup cooked beans with 6 whole grain crackers, 10 roasted peanuts  ½ medium baked potato with 3 ounces of low fat cheddar cheese and 1 TBSP  sour cream  1 small wheat tortilla brushed with 1 tsp olive oil then brushed with pizza sauce and 4 ounces low fat cheese, sliced mushrooms and green peppers broiled until cheese is melted.  ½ cup chopped melon    Sample Dinners:  4 ounces of tuna pan seared in 1 tsp olive oil, ½ baked small sweet potato and 2 small plums  ½ cup chick peas, 1 cup cauliflower sauteed with 1 tbsp chopped onion, 1 tsp oil, and 2 tsp martin powder. Add low sodium vegetable stock to desired consistency. Serve with ½ cup brown rice  Red beans seasoned with onions and bell peppers served over cauliflower rice  1 cup cooked green or brown lentils served with ½ cup cooked quinoa and chopped tomatoes and cucumbers and 1 tbsp feta cheese  Sample Snacks:  1 cup of Protein Silk Soy milk with 3 squares yovani crackers  3/4 ounce Triscuits with 1 ounce cubed cheese  Chobani Triple Zero yogurt with ¾ cup unsweetened cereal  ½ cup edamame (shelled)   Meal Ideas for Regular Bariatric Diet  *Recipes and products available at www.bariatriceating.com      Breakfast: (15-20g protein)    - Egg white omelet: 2 egg whites or ½ cup Egg Beaters. (Optional proteins: cheese, shrimp, black beans, chicken, sliced turkey) (Optional veggies: tomatoes, salsa, spinach, mushrooms, onions, green peppers, or small slice avocado)     - Egg and sausage: 1 egg or ¼ cup Egg Beaters (any variety), with 1 vane or 2 links of Turkey  sausage or Veggie breakfast sausage ("[employer] (Villij.com)" or AmSafe)    - Crust-less breakfast quiche: To make a glass pie dish, mix 4oz part skim Ricotta, 1 cup skim milk, and 2 eggs as your base. Add protein: shredded cheese, sliced lean ham or turkey, turkey galloway/sausage. Add veggies: tomato, onion, green onion, mushroom, green pepper, spinach, etc.    - Yogurt parfait: Mix 1 - 6oz container Dannon Light N Fit vanilla yogurt, with ¼ cup Kashi Go Lean cereal    - Cottage cheese and fruit: ½ cup part-skim cottage cheese or ricotta cheese topped with fresh fruit or sugar free preserves     - Dunia Hodges's Vanilla Egg custard* (add 2 Tbsp instant coffee granules to make Cappuccino Custard*)    - Hi-Protein café latte (skim milk, decaf coffee, 1 scoop protein powder). Optional to add Sugar free syrup or extract flavoring.    Lunch: (20-30g protein)    - ½ cup Black bean soup (Homemade or Progresso), with ¼ cup shredded low-fat cheese. Top with chopped tomato or fresh salsa.     - Lean deli turkey breast and low-fat sliced cheese, mustard or light knight to moisten, rolled up together, or wrapped in a Cody lettuce leaf    - Chicken salad made from dinner leftovers, moisten with low-fat salad dressing or light knight. Also try leftover salmon, shrimp, tuna or boiled eggs. Serve ½ cup over dark green salad    - Fat-free canned refried beans, topped with ¼ cup shredded low-fat cheese. Top with chopped tomato or fresh salsa.     - Greek salad: Top mixed greens with 1-2oz grilled chicken, tomatoes, red onions, 2-3 kalamata olives, and sprinkle lightly with feta cheese. Spritz with Balsamic vinegar to taste.     - Crust-less lunch quiche: To make a glass pie dish, mix 4oz part skim Ricotta, 1 cup skim milk, and 2 eggs as your base. Add protein: shredded cheese, sliced lean ham or turkey, shrimp, chicken. Add veggies: tomato, onion, green onion, mushroom, green pepper, spinach, artichoke, broccoli, etc.    - Pizza bake: tomato  sauce, low-fat shredded mozzarella and turkey pepperoni or Lebanese galloway. Add any veggies.    - Cucumber crab bites: Spread ¼ cup crab dip (lump crabmeat + light cream cheese and green onions) over sliced cucumber.     - Chicken with light spinach and artichoke dip*: Puree in : 6oz cooked and drained spinach, 2 cloves garlic, 1 can cannelloni beans, ½ cup chopped green onions, 1 can drained artichoke hearts (not marinated in oil), lemon juice and basil. Mix in 2oz chopped up chicken.    Supper: (20-30g protein)    - Serve grilled fish over dark green salad tossed with low-fat dressing, served with grilled asparagus crane     - Rotisserie chicken salad: served with sliced strawberries, walnuts, fat-free feta cheese crumbles and 1 tbsp Swansons Own Light Raspberry Brule Vinaigrette    - Shrimp cocktail: Dip cold boiled shrimp in homemade low-sugar cocktail sauce (1/2 cup Jese One Carb ketchup, 2 tbsp horseradish, 1/4 tsp hot sauce, 1 tsp Worcestershire sauce, 1 tbsp freshly-squeezed lemon juice). Serve with dark green salad, walnuts, and crumbled blue cheese drizzled with olive oil and Balsamic vinegar    - Tuna Melt: Spread tuna salad onto 2 thick slices of tomato. Top with low-fat cheese and broil until cheese is melted. May also be made with chicken salad of shrimp salad. [place] with different types of cheeses.    - Homemade low-fat Chili using extra lean ground beef or ground turkey. Top with shredded cheese and salsa as desired. May add dollop fat-free sour cream if desired    - Dinner Omelet with shrimp or chicken and onion, green peppers and chives.    - No noodle lasagna: Use sliced zucchini or eggplant in place of noodles.  Layer with part skim ricotta cheese and low sugar meat sauce (use very lean ground beef or ground turkey).    - Mexican chicken bake: Bake chunks of chicken breast or thigh with taco seasoning, Pace brand enchilada sauce, green onions and low-fat cheese. Serve with ¼  cup black beans or fat free refried beans topped with chopped tomatoes or salsa.    - Yoselin frozen meatballs, simmered in Classico Marinara sauce. Different flavors of salsa or spaghetti sauce create different dishes! Sprinkle with parmesan cheese. Serve with grilled or steamed veggies, or a dark green salad.    - Simmer boneless skinless chicken thigh chunks in Classico Marinara sauce or roasted salsa until tender with chopped onion, bell pepper, garlic, mushrooms, spinach, etc.     - Hamburger, without the bun, dressed the way you like. Served with grilled or steamed veggies.    - Eggplant parmesan: Bake slices of eggplant at 350 degrees for 15 minutes. Layer tomato sauce, sliced eggplant and low-fat mozzarella cheese in a baking dish and cover with foil. Bake 30-40 more minutes or until bubbly. Uncover and bake at 400 degrees for about 15 more minutes, or until top is slightly crisp.    - Fish tacos: grilled/baked white fish, wrapped in Cody lettuce leaf, topped with salsa, shredded low-fat cheese, and light coleslaw.    Snacks: (100-200 calories; >5g protein)    - 1 low-fat cheese stick with 8 cherry tomatoes or 1 serving fresh fruit  - 4 thin slices fat-free turkey breast and 1 slice low-fat cheese  - 4 thin slices fat-free honey ham with wedge of melon  - 1/4 cup unsalted nuts with ½ cup fruit  - 6-oz container Dannon Light n Fit vanilla yogurt, topped with 1oz unsalted nuts         - apple, celery or baby carrots spread with 2 Tbsp natural peanut butter or almond butter   - apple slices with 1 oz slice low-fat cheese  - celery, cucumber, bell pepper or baby carrots dipped in ¼ cup hummus bean spread or light spinach and artichoke dip (*recipe in lunch section)  - 100 calorie bag microwave light popcorn with 3 tbsp grated parmesan cheese  - Marco Links Beef Steak - 14g protein! (similar to beef jerky)  - 2 wedges L[place] Cow - Light Herb & Garlic Cheese with sliced cucumber or green bell pepper  - 1/2  cup low-fat cottage cheese with ¼ cup fruit or ¼ cup salsa  - RTD Protein drinks: Atkins, Low Carb Slim Fast, EAS light, Muscle Milk Light, etc.  - Homemade Protein drinks: GNC Soy95, Isopure, Nectar, UNJURY, Whey Gourmet, etc. Mix 1 scoop powder with 8oz skim/1% milk or light soymilk.  - Protein bars: Atkins, EAS, Pure Protein, Think Thin, Detour, etc. Must have 0-4 grams sugar - Read the label.    Takeout Options: No more than twice/week  Deli - Salads (no pasta or rice), meats, cheeses. Roasted chicken. Lox (salmon)    Mexican - Platters which don't include tortillas, chips, or rice. Go easy on the beans. Example: Fajitas without the tortillas. Ask the  not to bring chips to the table if they are too tempting.    Greek - Meat or fish and vegetable, but no bread or rice. Including hummus, baba ganoush, etc, is OK. Most sit-down Greek restaurants can provide you with cucumber slices for dipping instead of katja bread.    Fast Food (Avoid as much as possible) - Salads (no croutons and limit salad dressing to 2 tbsp), grilled chicken sandwich without the bun and ask for no knight. Genevas low fat chili or Taco Bell pintos and cheese.    BBQ - The meats are fine if you ask for sauces on the side, but most of the traditional side dishes are loaded with carbs. Tristan slaw, baked beans and BBQ sauce are typically made with sugar.    Chinese - Nothing deep-fried, no rice or noodles. Many Chinese sauces have starch and sugar in them, so you'll have to use your judgement. If you find that these sauces trigger cravings, or cause Dumping, you can ask for the sauce to be made without sugar or just use soy sauce.    Exercise should be some cardiovascular and some resistance training(see below).      STRENGTHENING  An adequate resistance training program could include the following types of exercise, focusing on the major muscle groups. One can use 5 to 10 pound dumbbells for those exercises that require the use of weight. At  home, one can use a household item that provides a comfortable weight, such as a milk carton or beverage container. These exercises can also be performed without weights. Add some type of resistance training 2-3 days a week. These can be body weight exercises, light weight or elastic bands. [employer] and [employer] are great sources for free work out plans and videos.       Chest (Bench Press): Hold the weights with your hands. Lying on a flat surface, with knees bent and feet flat, slowly bring the weights to the chest area with palms facing upward. Begin to exhale and press the weights up, fully extending the arms, and keeping them above your eyes. Inhale as you lower them to the starting position and repeat the movement.  Back (Bent-Over Row): Start by placing your feet shoulder-width apart.  a weight with each hand just outside the knees. Keeping the back straight and the knees flexed, slightly bend forward at the waist. Let the arms hang naturally while holding the weights. From this starting position, pull the weights to the lower abdomen, keeping the elbows close to the body. Exhale as you pull. Return to the starting position, inhaling as you go.  Arms (Bicep Curls): Hold a weight in each hand, with elbows at your sides, palms facing forward. The back should be straight, the chest flared outward. Begin to bend your right arm up first while exhaling, keeping the elbow totally stationary. Wait until the right arm goes completely down to the fully extended position, and then begin to curl the left arm. Each arm curled completes one full repetition.  Shoulders (Lateral Raises): Place the feet a few inches apart with the knees bent slightly. Keep the back erect as you lean forward slightly. With the weights in front of your thighs and palms facing together, begin to slowly raise them up to the side until parallel with the floor. Lower the weights to your thighs, and repeat.  Legs (Stiff Leg Dead Lift): Start by  "standing straight, holding the weights close to your sides, nearly touching your thighs. Keep the weights close to the body--this protects the back. The back must stay straight. Bend at the waist as far forward as you comfortably can while keeping your legs straight, and begin to feel the pull in your hamstrings as you lower the weights toward the ground. Slowly return to the starting position, keeping the back straight.  Legs (Dumbbell Lunge): Hold a weight in each hand, arms hanging at your sides. Step out with one leg, keeping the back straight. It is important to step out far enough so that the knee does not extend over the toe. This puts too much stress on the knee. Go down far enough so that the opposite knee nearly touches the ground. Keep this stance and repeat the lunging motion for several repetitions.  Abdominals: Do not perform standard sit-ups as these could hurt the lower back. Rather, focus on the following 2 exercises: (1) Obliques--Lie on your back with the knees flexed in the bent sit-up position. From this position, bring both knees down to the ground. With the back remaining flat, begin to flex your body toward your toes ("crunch"). Bring your shoulders up off the ground, but go slowly, controlling your momentum. Repeat this for 10 to 15 times. (2) Seated bench kicks/jeff knives--Sit on the end of a bench or chair with the hands placed behind the buttocks. Begin to kick the legs outward with the knees bent slightly--at the same time, lean back to extend the torso. Come back to the beginning position and repeat this motion 10 to 15 times.    "

## 2023-08-21 ENCOUNTER — HOSPITAL ENCOUNTER (OUTPATIENT)
Dept: RADIOLOGY | Facility: HOSPITAL | Age: 43
Discharge: HOME | End: 2023-08-21
Attending: NURSE PRACTITIONER
Payer: COMMERCIAL

## 2023-08-21 DIAGNOSIS — R74.8 ELEVATED LIVER ENZYMES: ICD-10-CM

## 2023-08-21 DIAGNOSIS — K74.00 LIVER FIBROSIS: ICD-10-CM

## 2023-08-21 DIAGNOSIS — K76.0 FATTY LIVER: ICD-10-CM

## 2023-08-21 PROCEDURE — 76700 US EXAM ABDOM COMPLETE: CPT | Mod: 26,,, | Performed by: RADIOLOGY

## 2023-08-21 PROCEDURE — 76700 US EXAM ABDOM COMPLETE: CPT | Mod: TC

## 2023-08-21 PROCEDURE — 76700 US ABDOMEN COMPLETE: ICD-10-PCS | Mod: 26,,, | Performed by: RADIOLOGY

## 2023-08-22 ENCOUNTER — OFFICE VISIT (OUTPATIENT)
Dept: BARIATRICS | Facility: CLINIC | Age: 43
End: 2023-08-22
Payer: COMMERCIAL

## 2023-08-22 VITALS
OXYGEN SATURATION: 97 % | HEIGHT: 74 IN | BODY MASS INDEX: 34.82 KG/M2 | WEIGHT: 271.31 LBS | HEART RATE: 72 BPM | SYSTOLIC BLOOD PRESSURE: 120 MMHG | DIASTOLIC BLOOD PRESSURE: 76 MMHG

## 2023-08-22 DIAGNOSIS — E66.9 OBESITY, CLASS I, BMI 30.0-34.9 (SEE ACTUAL BMI): Primary | ICD-10-CM

## 2023-08-22 PROCEDURE — 99999 PR PBB SHADOW E&M-EST. PATIENT-LVL IV: CPT | Mod: PBBFAC,,, | Performed by: INTERNAL MEDICINE

## 2023-08-22 PROCEDURE — 99214 OFFICE O/P EST MOD 30 MIN: CPT | Mod: S$GLB,,, | Performed by: INTERNAL MEDICINE

## 2023-08-22 PROCEDURE — 99214 PR OFFICE/OUTPT VISIT, EST, LEVL IV, 30-39 MIN: ICD-10-PCS | Mod: S$GLB,,, | Performed by: INTERNAL MEDICINE

## 2023-08-22 PROCEDURE — 99999 PR PBB SHADOW E&M-EST. PATIENT-LVL IV: ICD-10-PCS | Mod: PBBFAC,,, | Performed by: INTERNAL MEDICINE

## 2023-08-22 NOTE — PATIENT INSTRUCTIONS
Start saxenda injection daily.     Start saxenda injection daily.     Follow dosing guide for the first 5 weeks.   Week 1: Inject 0.6 mg daily  Week 2: Inject 1.2 mg daily  Week 3: Inject 1.8 mg daily  Week 4: Inject 2.4 mg daily  Week 5: Inject 3 mg daily.     When you are tolerating the 3 mg okay for at least 1 week, let us know.     https://www.ABC Live.Predictive Technologies/about-saxenda/dosing-schedule.html    Decrease portions as soon as you start Saxenda. Avoid fried, greasy or fatty foods. Some nausea in the first 6 weeks is not unusual.     If you get pain across the upper abdomen and around to your back, please call the office.     Exercise 30 min 3 days a week    Add some type of resistance training 2-3 days a week. These can be body weight exercises, light weight or elastic bands. [employer] and Synovex are great sources for free work out plans and videos.          Increase low impact activity as tolerated.  Avoid high impact activity, very heavy lifting or other exercise regimens that may cause discomfort.     https://iGrez LLCurseHeadplay.Predictive Technologies, Www.Arcivr, https://"[employer]Spend[employer].com for meal options.     1800 kelton pb meal planner, meal  ideas and exercise handouts given previously.       Tips for preparing for hurricane season:    If you are on weight loss medications, please take your medication with you in case of evacuation. Injectable medications should be transported with an ice pack if unopened or room temperature if you have started to use them.   Hurricane supplies do not necessarily need to be junk food or high in carbs or sugar. Shelf stable and healthy choices to include in your supplies could include:  Canned/packets of tuna or chicken  Apples, oranges, banana, pears  Beef or turkey jerky  Sugar free pudding  Pickle, olives, dill relish (mix with the tuna or chicken!)  Low sodium or no salt added canned vegetables  If you get bread, get lite bread (40 calories a slice)  0 sugar sports drinks  Water  String  cheese will be okay for a few days without refrigeration or in an ice chest.   Peanut or other nut butter.   Parmesan crisps  Pork skins  Protein shakes (20-30g protein, less than 5 g sugar)  Protein bars (15 or more g protein, less than 5 g sugar)  Don't forget disposable forks, spoons, plates in your supplies  If evacuated, manage stress by taking walks, reading or meditating.   If eating out make better choices when possible.   Salads with a lean protein and limited dressing, cheese or other toppings  Grilled chicken sandwich or burger without the bun.   Skip the fries!  Order water or unsweetened tea instead of soda  Grocery stores with a deli, salad/food bar can be a good quick and affordable option to replace fast food

## 2023-08-22 NOTE — PROGRESS NOTES
"Subjective     Patient ID: Danilo Zambrano is a 43 y.o. male.    Chief Complaint: Follow-up    CC: weight    Pt here today for follow-up. Has lost 21.6 lbs (-5.9 lbs muscle. -11.4 lbs fat). Was to start 1800 kelton pb meal planner, exercise, and saxenda. Just completed on 1.2 mg daily. Has been taking it at night, but feels like that is giving him more trouble sleeping.     New BMR: 2147    New PBF: 33.1%      Initial:  BMR:2247    PBF: 34.6%      Lab Results       Component                Value               Date                       ALT                      47 (H)              08/21/2023                 AST                      28                  08/21/2023                 ALKPHOS                  77                  08/21/2023                 BILITOT                  1.2 (H)             08/21/2023                    Review of Systems       Objective   /76   Pulse 72   Ht 6' 2" (1.88 m)   Wt 123.1 kg (271 lb 4.8 oz)   SpO2 97%   BMI 34.83 kg/m²    Physical Exam  Vitals reviewed.   Constitutional:       General: He is not in acute distress.     Appearance: He is well-developed. He is obese.   HENT:      Head: Normocephalic and atraumatic.   Eyes:      General: No scleral icterus.     Pupils: Pupils are equal, round, and reactive to light.   Neck:      Thyroid: No thyromegaly.   Cardiovascular:      Rate and Rhythm: Normal rate and regular rhythm.   Pulmonary:      Effort: Pulmonary effort is normal. No respiratory distress.   Musculoskeletal:         General: Normal range of motion.      Cervical back: Normal range of motion and neck supple.   Skin:     General: Skin is warm and dry.   Neurological:      Mental Status: He is alert and oriented to person, place, and time.   Psychiatric:         Behavior: Behavior normal.         Judgment: Judgment normal.            Assessment and Plan     1. Obesity, Class I, BMI 30.0-34.9 (see actual BMI)        Danilo Lockett was seen today for " follow-up.    Diagnoses and all orders for this visit:    Obesity, Class I, BMI 30.0-34.9 (see actual BMI)          Start saxenda injection daily.       Follow dosing guide for the first 5 weeks.   Week 1: Inject 0.6 mg daily  Week 2: Inject 1.2 mg daily  Week 3: Inject 1.8 mg daily  Week 4: Inject 2.4 mg daily  Week 5: Inject 3 mg daily.     When you are tolerating the 3 mg okay for at least 1 week, let us know.       https://www.Spotzer/about-saxenda/dosing-schedule.html    Decrease portions as soon as you start Saxenda. Avoid fried, greasy or fatty foods. Some nausea in the first 6 weeks is not unusual.     If you get pain across the upper abdomen and around to your back, please call the office.       Exercise 30 min 3 days a week    Increase low impact activity as tolerated.  Avoid high impact activity, very heavy lifting or other exercise regimens that may cause discomfort.     https://Tapprcoursemeals.com, Www.1000jobboersen.de.World Vital Records, https://DynaOptics.com for meal options.     1800 kelton pb meal planner, meal  ideas and exercise handouts given previously.     Hurricane tips given       No follow-ups on file.

## 2023-08-28 ENCOUNTER — LAB VISIT (OUTPATIENT)
Dept: LAB | Facility: HOSPITAL | Age: 43
End: 2023-08-28
Payer: COMMERCIAL

## 2023-08-28 ENCOUNTER — PROCEDURE VISIT (OUTPATIENT)
Dept: HEPATOLOGY | Facility: CLINIC | Age: 43
End: 2023-08-28
Payer: COMMERCIAL

## 2023-08-28 ENCOUNTER — OFFICE VISIT (OUTPATIENT)
Dept: HEPATOLOGY | Facility: CLINIC | Age: 43
End: 2023-08-28
Payer: COMMERCIAL

## 2023-08-28 VITALS — HEIGHT: 74 IN | BODY MASS INDEX: 34.8 KG/M2 | WEIGHT: 271.19 LBS

## 2023-08-28 DIAGNOSIS — K76.0 FATTY LIVER: Primary | ICD-10-CM

## 2023-08-28 DIAGNOSIS — E66.9 OBESITY (BMI 30-39.9): ICD-10-CM

## 2023-08-28 DIAGNOSIS — Z23 NEED FOR HEPATITIS B VACCINATION: ICD-10-CM

## 2023-08-28 DIAGNOSIS — R74.8 ELEVATED LIVER ENZYMES: ICD-10-CM

## 2023-08-28 DIAGNOSIS — K76.0 FATTY LIVER: ICD-10-CM

## 2023-08-28 DIAGNOSIS — R17 ELEVATED BILIRUBIN: ICD-10-CM

## 2023-08-28 DIAGNOSIS — R16.2 HEPATOSPLENOMEGALY: ICD-10-CM

## 2023-08-28 DIAGNOSIS — K74.00 LIVER FIBROSIS: ICD-10-CM

## 2023-08-28 PROBLEM — Z13.1 SCREENING FOR DIABETES MELLITUS: Status: RESOLVED | Noted: 2023-06-12 | Resolved: 2023-08-28

## 2023-08-28 PROBLEM — R79.89 ELEVATED FERRITIN: Status: RESOLVED | Noted: 2023-06-21 | Resolved: 2023-08-28

## 2023-08-28 PROCEDURE — 99214 OFFICE O/P EST MOD 30 MIN: CPT | Mod: S$GLB,,, | Performed by: NURSE PRACTITIONER

## 2023-08-28 PROCEDURE — 81350 UGT1A1 GENE COMMON VARIANTS: CPT | Performed by: NURSE PRACTITIONER

## 2023-08-28 PROCEDURE — 99999 PR PBB SHADOW E&M-EST. PATIENT-LVL III: ICD-10-PCS | Mod: PBBFAC,,, | Performed by: NURSE PRACTITIONER

## 2023-08-28 PROCEDURE — 76981 USE PARENCHYMA: CPT | Mod: S$GLB,,, | Performed by: NURSE PRACTITIONER

## 2023-08-28 PROCEDURE — 99999 PR PBB SHADOW E&M-EST. PATIENT-LVL III: CPT | Mod: PBBFAC,,, | Performed by: NURSE PRACTITIONER

## 2023-08-28 PROCEDURE — 36415 COLL VENOUS BLD VENIPUNCTURE: CPT | Performed by: NURSE PRACTITIONER

## 2023-08-28 PROCEDURE — 99214 PR OFFICE/OUTPT VISIT, EST, LEVL IV, 30-39 MIN: ICD-10-PCS | Mod: S$GLB,,, | Performed by: NURSE PRACTITIONER

## 2023-08-28 PROCEDURE — 76981 FIBROSCAN NEW ORLEANS (VIBRATION CONTROLLED TRANSIENT ELASTOGRAPHY): ICD-10-PCS | Mod: S$GLB,,, | Performed by: NURSE PRACTITIONER

## 2023-08-28 RX ORDER — HEPATITIS B VACCINE (RECOMBINANT) ADJUVANTED 20 UG/.5ML
0.5 INJECTION, SOLUTION INTRAMUSCULAR ONCE
Qty: 0.5 ML | Refills: 1 | Status: SHIPPED | OUTPATIENT
Start: 2023-08-28 | End: 2023-08-29

## 2023-08-28 NOTE — PATIENT INSTRUCTIONS
1. Fibroscan to look for fat or scar tissue in the liver showed >67% fat in the liver, no scar tissue damage  2. So far, no liver reason for an enlarged spleen, so will do MRI soon   3.  Recommend vaccines for Hepatitis  B, see below   3. Follow up based on MRI and labs    There is no FDA approved therapy for fatty liver disease. Therefore, these things are important:  Limit alcohol consumption, none until further notice   2 Weight loss goal of 25-40 lbs, referral for [facility] if interested. Also, if interested in a dietician visit to create a weight loss plan, contact the dietician team at [facility] at [email or web address] to schedule a visit to you can call [facility] in [place]: [phone] and the  will transfer the call to one of the dieticians to schedule an appointment. Or you can also call [phone] to schedule. They do offer video visits   3. Low carb/sugar and high protein diet (~1 g/kg/day of protein).Try to limit your carb intake to LESS than 30-45 grams of carbs with a meal or LESS than 5-10 grams with any snack (total of any snack foods eaten during that time). Use Cash Check Card Pal or Lose It odessa to add up your carbs through the day. Do NOT drink any beverages with calories or carbs (this can lead to high blood sugar and weight gain). Also, some of our patients have been very successful with weight loss using the pre made/planned meal planning services that limit calories and portion size ( The main thing to look for is low calorie, high protein, low carb)  4. Exercise, 5 days per week, 30 minutes per day, as tolerated  5. Recommend good cholesterol, blood pressure, blood sugar levels     In some people, fatty liver can progress to steatohepatitis (inflamatory fatty liver) and possibly to cirrhosis, increasing the risk for liver cancer, liver failure, and death. Therefore, the lifestyle changes are very important to decrease this risk.     Website with  information about fatty liver and inflammation related to fatty liver ([facility]MIER) = www.nashtruth.com  AND www.NASHactually.com        HEP B VACCINE  Your labs show that you DO have immunity against Hep A (+ Hep A IgG), so no further vaccine needed for Hep A    Your immunity markers show that you do NOT have immunity against Hepatitis B, so I recommend that you receive the Hepatitis B vaccine. This will protect your liver from the virus, which can make your liver very sick. The vaccine series is either 2 or 3 vaccines (whichever your insurance covers), either:  1. One now, one at 4 weeks (completed after this)  OR  2.  One now, one at 4 weeks and one 6 months after the 1st one.    I sent the vaccine to the [facility]. I recommend calling them in a couple of days to see if the vaccine is covered by your insurance and arrange the vaccines if they are covered. Their number is [phone]      If the vaccine is not covered at the pharmacy level, I sent an order that you can get the vaccine in the infectious disease department at [facility]. If that is the case, please call [phone]  to schedule your vaccine administration appointment in the infectious disease department.  If you need to proceed with vaccines with the infectious disease department, you can call to obtain a cost for these vaccines before you proceed, you can call the [facility] office at [phone] or [phone]

## 2023-08-28 NOTE — PROGRESS NOTES
[facility] VISIT NEW PT NOTE    REFERRING PROVIDER:  Dr. Kane Marie  PCP: Zheng Dobbins MD (Inactive)     CHIEF COMPLAINT: fatty liver, h/o liver fibrosis ?, elevated liver enzymes, elevated bili, splenomegaly    HPI: This is a 43 y.o. male with PMH noted below, presenting for follow up of above    Previous serologic w/u negative for Azeem's, alpha-1 antitrypsin deficiency, autoimmune etiology, and viral hepatitis A, B and C  Markedly elevated ferritin in 2019, low iron - repeat near normal, iron WNL    Prior serologic workup:   Lab Results   Component Value Date    SMOOTHMUSCAB Negative 1:40 07/02/2019    AMAIFA Negative 1:40 07/02/2019    IGGSERUM 989 07/02/2019    FERRITIN 301 (H) 08/21/2023    FESATURATED 21 08/21/2023    CERULOPLSM 45.0 07/02/2019    HEPBSAG Negative 06/25/2019    HEPCAB Negative 06/25/2019    HEPAIGM Negative 06/25/2019       Risk factors for fatty liver include obesity    Liver fibrosis staging:  -- fibroscan in 2019 noted F2, S3 (kPA 8.1, )  -- fibroscan 8/2023 noted F0, S3 (kPA 4.3, )    Interval HPI: Presents today alone.   100 lb weight gain in the past ~5+ years, 80 lbs since covid  22 lbs loss since starting Saxenda  Currently 271 lbs   No SSB  Trying to limit portions and carbs  Does tend to eat more at night    Labs done 5/2023 show elevated transaminase levels (ALT > AST, elevated since at least 2019)  Elevated bili, I>D, will test for Donaldson  Platelets WNL, alk phos WNL  Synthetic liver functioning WNL    Lab Results   Component Value Date    ALT 47 (H) 08/21/2023    AST 28 08/21/2023    ALKPHOS 77 08/21/2023    BILITOT 1.2 (H) 08/21/2023    ALBUMIN 4.2 08/21/2023    INR 1.0 07/17/2020     06/13/2023       Abd U/S done 8/2023 showed fatty liver, focal fatty sparing, + splenomegaly (15cm)    Denies family history of liver disease . Rare Alcohol consumption, see below  Social History     Substance and Sexual Activity   Alcohol Use Yes     "Comment: once a month max       + Immunity to Hep A - needs Hep B vaccine, sent to [facility] pharm and ID         Allergy and medication list reviewed and updated     PMHX:  has a past medical history of Genital herpes and Stable angina (07/03/2017).    PSHX:  has a past surgical history that includes Nasal septum surgery; cardiac cath (07/03/2017); Tonsillectomy; and Colonoscopy (N/A, 6/16/2023).    FAMILY HISTORY: Updated and [facility] in [facility]    SOCIAL HISTORY:   Social History     Substance and Sexual Activity   Alcohol Use Yes    Comment: once a month max       Social History     Substance and Sexual Activity   Drug Use No       ROS:   GENERAL: Denies fatigue  CARDIOVASCULAR: Denies edema  GI: Denies abdominal pain  SKIN: Denies rash, itching   NEURO: Denies confusion, memory loss, or mood changes    PHYSICAL EXAM:   Friendly White male, in no acute distress; alert and oriented to person, place and time  VITALS: Ht 6' 2" (1.88 m)   Wt 123 kg (271 lb 2.7 oz)   BMI 34.82 kg/m²   EYES: Sclerae anicteric  GI: Soft, non-tender, non-distended. No ascites.  EXTREMITIES:  No edema.  SKIN: Warm and dry. No jaundice. No telangectasias noted. No palmar erythema.  NEURO:  No asterixis.  PSYCH:  Thought and speech pattern appropriate. Behavior normal      EDUCATION:  See instructions discussed with patient in Instructions section of the After Visit Summary     ASSESSMENT & PLAN:  43 y.o. male with:  1.  Fatty liver, likely related to metabolic risk factors   - see HPI  -- Immunity to Hep A and B : see HPI  -- Fibroscan 8/2023 noted F0, S3 - will compare to MRI elasto g iven elevated bili and splenomegaly  -- Recommendations discussed with patient:  Limit alcohol consumption  2 Weight loss goal of 50-75 lbs  3. Low carb/sugar, high fiber and protein diet, limit your carb intake to LESS than 30-45 grams of carbs with a meal or LESS than 5-10 grams with any snack   4. Exercise, 5 days per week, 30 minutes per day, as tolerated  5. " Recommend good cholesterol, blood pressure, blood sugar levels     2. Elevated liver enzymes, ferritin   -- sero w/u in HPI,  ferritin near normal     3. Obesity  -- Body mass index is 34.82 kg/m².   -- increases risk for fatty liver    4. Splenomegaly, elevated bili  -- will screen for Dewart and compare fibrosis with MRI elasto. If MRI with any fibrosis, will need liver biopsy       ADDENDUM 9/25/2023: Both fibroscan and MRI note no fibrosis. Do not suspect splenomegaly is due to liver disease given no fibrosis. F/u in 1 year with fibroscan and labs before  Pt to f/u with PCP about splenomegaly    Follow up for pending results of workup. Based on labs and MRI elasto  Orders Placed This Encounter   Procedures    MR Elastography    Hepatitis B (Recombinant) Adjuvanted, 2 dose    [id number] Genotype        Thank you for allowing me to participate in the care of MIS Church    I spent a total of 30 minutes on the day of the visit.This includes face to face time and non-face to face time preparing to see the patient (eg, review of tests), obtaining and/or reviewing separately obtained history, documenting clinical information in the electronic or other health record, independently interpreting results and communicating results to the patient/family/caregiver, and coordinating care.         CC'ed note to:

## 2023-08-28 NOTE — PROCEDURES
FibroScan [place] (Vibration Controlled Transient Elastography)    Date/Time: 8/28/2023 1:30 PM    Performed by: Allison Farrar NP  Authorized by: Allison Farrar, CHIP    Diagnosis:  NAFLD    Probe:  XL    Universal Protocol: Patient's identity, procedure and site were verified, confirmatory pause was performed.  Discussed procedure including risks and potential complications.  Questions answered.  Patient verbalizes understanding and wishes to proceed with VCTE.     Procedure: After providing explanations of the procedure, patient was placed in the supine position with right arm in maximum abduction to allow optimal exposure of right lateral abdomen.  Patient was briefly assessed, Testing was performed in the mid-axillary location, 50Hz Shear Wave pulses were applied and the resulting Shear Wave and Propagation Speed detected with a 3.5 MHz ultrasonic signal, using the FibroScan probe, Skin to liver capsule distance and liver parenchyma were accessed during the entire examination with the FibroScan probe, Patient was instructed to breathe normally and to abstain from sudden movements during the procedure, allowing for random measurements of liver stiffness. At least 10 Shear Waves were produced, Individual measurements of each Shear Wave were calculated.  Patient tolerated the procedure well with no complications.  Meets discharge criteria as was dismissed.  Rates pain 0 out of 10.  Patient will follow up with ordering provider to review results.    Findings  Median liver stiffness score:  4.3  CAP Reading: dB/m:  291    IQR/med %:  12  Interpretation  Fibrosis interpretation is based on medial liver stiffness - Kilopascal (kPa).    Fibrosis Stage:  F 0-1  Steatosis interpretation is based on controlled attenuation parameter - (dB/m).    Steatosis Grade:  S3

## 2023-08-31 ENCOUNTER — PATIENT MESSAGE (OUTPATIENT)
Dept: HEPATOLOGY | Facility: CLINIC | Age: 43
End: 2023-08-31
Payer: COMMERCIAL

## 2023-08-31 LAB
ANNOTATION COMMENT IMP: NORMAL
GENETICIST REVIEW: NORMAL
PROVIDER SIGNING NAME: NORMAL
TEST PERFORMANCE INFO SPEC: NORMAL
UGT1A1 ALLELE GENO BLD/T: NORMAL
UGT1A1 GENE PROD MET ACT IMP BLD/T-IMP: NORMAL
UGT1A1 METHOD: NORMAL

## 2023-09-19 DIAGNOSIS — Z86.19 HISTORY OF HERPES GENITALIS: ICD-10-CM

## 2023-09-19 RX ORDER — VALACYCLOVIR HYDROCHLORIDE 500 MG/1
TABLET, FILM COATED ORAL
Qty: 90 TABLET | Refills: 0 | Status: SHIPPED | OUTPATIENT
Start: 2023-09-19

## 2023-09-19 RX ORDER — ROSUVASTATIN CALCIUM 10 MG/1
10 TABLET, COATED ORAL DAILY
Qty: 90 TABLET | Refills: 3 | Status: SHIPPED | OUTPATIENT
Start: 2023-09-19

## 2023-09-21 ENCOUNTER — HOSPITAL ENCOUNTER (OUTPATIENT)
Dept: RADIOLOGY | Facility: HOSPITAL | Age: 43
Discharge: HOME | End: 2023-09-21
Attending: NURSE PRACTITIONER
Payer: COMMERCIAL

## 2023-09-21 DIAGNOSIS — K76.0 FATTY LIVER: ICD-10-CM

## 2023-09-21 DIAGNOSIS — R17 ELEVATED BILIRUBIN: ICD-10-CM

## 2023-09-21 PROCEDURE — 76391 MR ELASTOGRAPHY: CPT | Mod: 26,,, | Performed by: RADIOLOGY

## 2023-09-21 PROCEDURE — 76391 MR ELASTOGRAPHY: CPT | Mod: TC

## 2023-09-21 PROCEDURE — 76391 MR ELASTOGRAPHY: ICD-10-PCS | Mod: 26,,, | Performed by: RADIOLOGY

## 2023-09-23 ENCOUNTER — PATIENT MESSAGE (OUTPATIENT)
Dept: HEPATOLOGY | Facility: CLINIC | Age: 43
End: 2023-09-23
Payer: COMMERCIAL

## 2023-09-26 ENCOUNTER — PATIENT MESSAGE (OUTPATIENT)
Dept: BARIATRICS | Facility: CLINIC | Age: 43
End: 2023-09-26
Payer: COMMERCIAL

## 2023-09-26 DIAGNOSIS — E66.9 OBESITY, CLASS I, BMI 30.0-34.9 (SEE ACTUAL BMI): Primary | ICD-10-CM

## 2023-09-28 ENCOUNTER — OFFICE VISIT (OUTPATIENT)
Dept: SLEEP MEDICINE | Facility: CLINIC | Age: 43
End: 2023-09-28
Payer: COMMERCIAL

## 2023-09-28 VITALS
HEIGHT: 74 IN | WEIGHT: 251.31 LBS | BODY MASS INDEX: 32.25 KG/M2 | SYSTOLIC BLOOD PRESSURE: 123 MMHG | DIASTOLIC BLOOD PRESSURE: 83 MMHG | HEART RATE: 73 BPM

## 2023-09-28 DIAGNOSIS — R06.83 SNORING: ICD-10-CM

## 2023-09-28 DIAGNOSIS — F51.09 OTHER INSOMNIA NOT DUE TO A SUBSTANCE OR KNOWN PHYSIOLOGICAL CONDITION: Primary | ICD-10-CM

## 2023-09-28 DIAGNOSIS — R53.83 FATIGUE, UNSPECIFIED TYPE: ICD-10-CM

## 2023-09-28 DIAGNOSIS — G47.9 SLEEP DISTURBANCE: ICD-10-CM

## 2023-09-28 PROCEDURE — 99204 OFFICE O/P NEW MOD 45 MIN: CPT | Mod: S$GLB,,, | Performed by: INTERNAL MEDICINE

## 2023-09-28 PROCEDURE — 99999 PR PBB SHADOW E&M-EST. PATIENT-LVL III: ICD-10-PCS | Mod: PBBFAC,,, | Performed by: INTERNAL MEDICINE

## 2023-09-28 PROCEDURE — 99204 PR OFFICE/OUTPT VISIT, NEW, LEVL IV, 45-59 MIN: ICD-10-PCS | Mod: S$GLB,,, | Performed by: INTERNAL MEDICINE

## 2023-09-28 PROCEDURE — 99999 PR PBB SHADOW E&M-EST. PATIENT-LVL III: CPT | Mod: PBBFAC,,, | Performed by: INTERNAL MEDICINE

## 2023-09-28 NOTE — PROGRESS NOTES
"  Referred by Marina Fritz PA-C     NEW PATIENT VISIT    Danilo Zambrano  is a pleasant 43 y.o. male  with PMH significant for low T, BMI 30-40 who presents for evaluation for snoring and witnessed apneas.    SLEEP SCHEDULE   Environment    Bed Time 1-2A (10P)   Sleep Latency awhile   Arousals several   Nocturia    Back to sleep Not long   Wake time 7:30 - 8:30A   Naps    Work Video game production       Past Medical History:   Diagnosis Date    Genital herpes     Stable angina 07/03/2017     Patient Active Problem List   Diagnosis    Genital herpes    Elevated liver enzymes    Hepatosplenomegaly    Hepatic cyst    Agatston coronary artery calcium score less than 100    Perianal excoriation    Low testosterone in male    Fatigue    Fatty liver    Obesity (BMI 30-39.9)    Elevated bilirubin       Current Outpatient Medications:     liraglutide, weight loss, (SAXENDA) 3 mg/0.5 mL (18 mg/3 mL) PnIj, Inject 3 mg into the skin once daily., Disp: 15 mL, Rfl: 3    pen needle, diabetic (BD ULTRA-FINE HOLA PEN NEEDLE) 32 gauge x 5/32" Ndle, Use 1 daily with Saxenda, Disp: 100 each, Rfl: 1    rosuvastatin (CRESTOR) 10 MG tablet, Take 1 tablet (10 mg total) by mouth once daily., Disp: 90 tablet, Rfl: 3    valACYclovir (VALTREX) 500 MG tablet, TAKE 1 TABLET BY MOUTH TWICE DAILY FOR OUTBREAK/RECURRENCE EPISODES, Disp: 90 tablet, Rfl: 0       Vitals:    09/28/23 1316   BP: 123/83   BP Location: Left arm   Patient Position: Sitting   BP Method: Large (Manual)   Pulse: 73   Weight: 114 kg (251 lb 5.2 oz)   Height: 6' 2" (1.88 m)     Physical Exam:    GEN:   Well-appearing  Psych:  Appropriate affect, demonstrates insight  SKIN:  No rash on the face or bridge of the nose      LABS:   Lab Results   Component Value Date    HGB 14.2 06/13/2023    CO2 26 05/09/2023       RECORDS REVIEWED PREVIOUSLY:      ASSESSMENT    PROBLEM DESCRIPTION/ Sx on Presentation  STATUS   possible OLIVIA   + loud snoring, no witnessed apneas     " New   Daytime Sx   + sleepiness when inactive (getting about 5 hours of sleep at night)  ESS 9/24 on intake  New   Insomnia / DSPS     Trouble falling asleep: can be difficult at times  Maintenance:         waking frequently (frequent pain)  Prior hypnotics:        Current hypnotics:     New   AM headache   About once per week  NEW   Other issues: chronic rectal pain    PLAN     -recommend sleep testing   -discussed trial therapy if OLIVIA present and the patient is  open to a trial of CPAP therapy  -will address insomnia after sleep testing    RTC          The patient was given open opportunity to ask questions and/or express concerns about treatment plan.   All questions/concerns were discussed.     Two patient identifiers used prior to evaluation.

## 2023-10-04 ENCOUNTER — TELEPHONE (OUTPATIENT)
Dept: SLEEP MEDICINE | Facility: OTHER | Age: 43
End: 2023-10-04
Payer: COMMERCIAL

## 2023-10-09 RX ORDER — SEMAGLUTIDE 2.4 MG/.75ML
2.4 INJECTION, SOLUTION SUBCUTANEOUS
Qty: 3 ML | Refills: 1 | Status: SHIPPED | OUTPATIENT
Start: 2023-11-09 | End: 2023-11-28

## 2023-10-09 RX ORDER — SEMAGLUTIDE 1.7 MG/.75ML
1.7 INJECTION, SOLUTION SUBCUTANEOUS
Qty: 3 ML | Refills: 0 | Status: SHIPPED | OUTPATIENT
Start: 2023-10-09 | End: 2023-11-28

## 2023-10-12 ENCOUNTER — TELEPHONE (OUTPATIENT)
Dept: SLEEP MEDICINE | Facility: OTHER | Age: 43
End: 2023-10-12
Payer: COMMERCIAL

## 2023-10-13 ENCOUNTER — HOSPITAL ENCOUNTER (OUTPATIENT)
Dept: SLEEP MEDICINE | Facility: OTHER | Age: 43
Discharge: HOME | End: 2023-10-13
Attending: INTERNAL MEDICINE
Payer: COMMERCIAL

## 2023-10-13 DIAGNOSIS — F51.09 OTHER INSOMNIA NOT DUE TO A SUBSTANCE OR KNOWN PHYSIOLOGICAL CONDITION: ICD-10-CM

## 2023-10-13 DIAGNOSIS — G47.9 SLEEP DISTURBANCE: ICD-10-CM

## 2023-10-13 DIAGNOSIS — R53.83 FATIGUE, UNSPECIFIED TYPE: ICD-10-CM

## 2023-10-13 DIAGNOSIS — R06.83 SNORING: ICD-10-CM

## 2023-10-13 PROCEDURE — 95800 SLP STDY UNATTENDED: CPT

## 2023-10-16 PROBLEM — G47.9 SLEEP DISTURBANCE: Status: ACTIVE | Noted: 2023-10-16

## 2023-10-17 ENCOUNTER — PATIENT MESSAGE (OUTPATIENT)
Dept: SLEEP MEDICINE | Facility: CLINIC | Age: 43
End: 2023-10-17

## 2023-10-17 PROCEDURE — 95806 SLEEP STUDY UNATT&RESP EFFT: CPT | Mod: 26,,, | Performed by: INTERNAL MEDICINE

## 2023-10-17 PROCEDURE — 95806 PR SLEEP STUDY, UNATTENDED, SIMUL RECORD HR/O2 SAT/RESP FLOW/RESP EFFT: ICD-10-PCS | Mod: 26,,, | Performed by: INTERNAL MEDICINE

## 2023-10-21 ENCOUNTER — IMMUNIZATION (OUTPATIENT)
Dept: INTERNAL MEDICINE | Facility: CLINIC | Age: 43
End: 2023-10-21
Payer: COMMERCIAL

## 2023-10-21 PROCEDURE — 90471 FLU VACCINE (QUAD) GREATER THAN OR EQUAL TO 3YO PRESERVATIVE FREE IM: ICD-10-PCS | Mod: S$GLB,,, | Performed by: INTERNAL MEDICINE

## 2023-10-21 PROCEDURE — 90686 FLU VACCINE (QUAD) GREATER THAN OR EQUAL TO 3YO PRESERVATIVE FREE IM: ICD-10-PCS | Mod: S$GLB,,, | Performed by: INTERNAL MEDICINE

## 2023-10-21 PROCEDURE — 90471 IMMUNIZATION ADMIN: CPT | Mod: S$GLB,,, | Performed by: INTERNAL MEDICINE

## 2023-10-21 PROCEDURE — 90686 IIV4 VACC NO PRSV 0.5 ML IM: CPT | Mod: S$GLB,,, | Performed by: INTERNAL MEDICINE

## 2023-10-25 ENCOUNTER — PATIENT MESSAGE (OUTPATIENT)
Dept: HEPATOLOGY | Facility: CLINIC | Age: 43
End: 2023-10-25
Payer: COMMERCIAL

## 2023-10-25 NOTE — LETTER
October 31, 2023        Danilo Zambrano             Hepatology Clinic - [facility]  [address]  [facility] [address]  Phone: [phone]   Patient: Danilo Zambrano   MR Number: [id number]   YOB: 1980   Date of Visit: 10/25/2023       [employer] Appeals Department,    Mr. Zambrano is followed in the [facility] for fatty liver, elevated liver enzymes, elevated bilirubin and an enlarged spleen. Fatty liver can cause liver fibrosis and cirrhosis. Therefore, it is recommended that all patients with fatty liver receive at least yearly fibrosis staging with either a fibroscan and/or an MRI elastography to assess for scar tissue or cirrhosis. For any findings of liver fibrosis or for any other signs of cirrhosis, proceeding to an MRI elastography is recommended.     One of the first signs of cirrhosis can be an enlarged spleen and an elevated bilirubin. Mr. Zambrano has both an enlarged spleen and an elevated bilirubin.  Mr. Zambrano's initial fibroscan using an extra large probe to measure for liver fibrosis showed severe fatty liver and scar tissue in the liver. Given the results of his initial fibroscan, an MRI elastography was done to compare liver fibrosis measurements to determine if he would need to proceed with a liver biopsy. This test was medically necessary in the workup for evaluation of his liver disease.      Please re-consider his MRI elastography as a medically necessary test.       If you have questions, please do not hesitate to call me. Thanks in advance.     Sincerely,      Allison Farrar NP

## 2023-10-27 NOTE — TELEPHONE ENCOUNTER
Called pt, never received any correspondence from his insurance or instructions for PA or appeal. Per Epic, it was noted as approved and per [facility] pre service it was noted as approved     Pt reports he was initially told it was approved, then later when he tried to get some of his paid deposit back, the insurance company said it was experiemental and was not approved    He said he spoke to Martha and the representative was very helpful and told him that it should have been approved but because he was outside of the 14 day window for peer to peer/appeal, she couldn't so anything    Per [facility] pre service rep, it is noted as approved, so not sure what the actual status is. I tried to call the number given by the pt without success. Will wait for further information from the pt       [phone]  [employer]     Claim ID [id number]    CLAIM [id number]

## 2023-10-31 NOTE — TELEPHONE ENCOUNTER
Called provider line provided by patient     Denied by [employer] because of lack of medical necessity    Code is considered experimental, subject to medical review     Appeal via fax [phone]  Attn: Appeals  30-45 days     Faxed appeal letter with clinic note to [employer] appeals. Also excalated to pre- to investigate why his MRI was listed in Epic as covered

## 2023-11-01 ENCOUNTER — PATIENT MESSAGE (OUTPATIENT)
Dept: BARIATRICS | Facility: CLINIC | Age: 43
End: 2023-11-01
Payer: COMMERCIAL

## 2023-11-11 DIAGNOSIS — E66.9 OBESITY, CLASS I, BMI 30.0-34.9 (SEE ACTUAL BMI): ICD-10-CM

## 2023-11-13 DIAGNOSIS — G47.33 OSA (OBSTRUCTIVE SLEEP APNEA): Primary | ICD-10-CM

## 2023-11-17 RX ORDER — SEMAGLUTIDE 1.7 MG/.75ML
1.7 INJECTION, SOLUTION SUBCUTANEOUS
Qty: 3 ML | Refills: 0 | OUTPATIENT
Start: 2023-11-17

## 2023-11-28 ENCOUNTER — OFFICE VISIT (OUTPATIENT)
Dept: BARIATRICS | Facility: CLINIC | Age: 43
End: 2023-11-28
Payer: COMMERCIAL

## 2023-11-28 VITALS
HEART RATE: 82 BPM | HEIGHT: 74 IN | OXYGEN SATURATION: 98 % | DIASTOLIC BLOOD PRESSURE: 72 MMHG | BODY MASS INDEX: 31.89 KG/M2 | WEIGHT: 248.5 LBS | SYSTOLIC BLOOD PRESSURE: 120 MMHG

## 2023-11-28 DIAGNOSIS — E66.9 OBESITY, CLASS I, BMI 30.0-34.9 (SEE ACTUAL BMI): ICD-10-CM

## 2023-11-28 PROCEDURE — 99999 PR PBB SHADOW E&M-EST. PATIENT-LVL IV: CPT | Mod: PBBFAC,,, | Performed by: INTERNAL MEDICINE

## 2023-11-28 PROCEDURE — 99999 PR PBB SHADOW E&M-EST. PATIENT-LVL IV: ICD-10-PCS | Mod: PBBFAC,,, | Performed by: INTERNAL MEDICINE

## 2023-11-28 PROCEDURE — 99214 PR OFFICE/OUTPT VISIT, EST, LEVL IV, 30-39 MIN: ICD-10-PCS | Mod: S$GLB,,, | Performed by: INTERNAL MEDICINE

## 2023-11-28 PROCEDURE — 99214 OFFICE O/P EST MOD 30 MIN: CPT | Mod: S$GLB,,, | Performed by: INTERNAL MEDICINE

## 2023-11-28 RX ORDER — SEMAGLUTIDE 2.4 MG/.75ML
2.4 INJECTION, SOLUTION SUBCUTANEOUS
Qty: 9 ML | Refills: 1 | Status: SHIPPED | OUTPATIENT
Start: 2023-11-28 | End: 2024-03-13 | Stop reason: SDUPTHER

## 2023-11-28 RX ORDER — SEMAGLUTIDE 2.4 MG/.75ML
2.4 INJECTION, SOLUTION SUBCUTANEOUS
Qty: 3 ML | Refills: 1 | Status: SHIPPED | OUTPATIENT
Start: 2023-11-28 | End: 2023-11-28 | Stop reason: SDUPTHER

## 2023-11-28 NOTE — PROGRESS NOTES
"Subjective     Patient ID: Danilo Zambrano is a 43 y.o. male.    Chief Complaint: Follow-up    CC: weight    Pt here today for follow-up. Has lost 44.2 lbs (-11 lbs muscle. -25.2 lbs fat). Was to start [address] pb meal planner, exercise, and currently on wegovy 2.4 mg weekly. Denies SE. He feels like he is doing better with eating. Having smoothie shaun smoothies, eggs, berries, turkey, cereal. Exercise- has not done much since having a baby.     New BMR: 2060    New PBF: 30.6%      Initial:  BMR:2247    PBF: 34.6%      Lab Results       Component                Value               Date                       ALT                      47 (H)              08/21/2023                 AST                      28                  08/21/2023                 ALKPHOS                  77                  08/21/2023                 BILITOT                  1.2 (H)             08/21/2023              Lab Results       Component                Value               Date                       HGBA1C                   5.6                 06/13/2023                 HGBA1C                   5.5                 11/18/2021                 HGBA1C                   5.2                 06/19/2019            Lab Results       Component                Value               Date                       LDLCALC                  68.8                08/12/2022                 CREATININE               0.9                 05/09/2023                  Review of Systems       Objective   /72 (BP Location: Right arm, Patient Position: Sitting, BP Method: Large (Manual))   Pulse 82   Ht 6' 2" (1.88 m)   Wt 112.7 kg (248 lb 8 oz)   SpO2 98%   BMI 31.91 kg/m²    Physical Exam  Vitals reviewed.   Constitutional:       General: He is not in acute distress.     Appearance: He is well-developed. He is obese.   HENT:      Head: Normocephalic and atraumatic.   Eyes:      General: No scleral icterus.     Pupils: Pupils are equal, round, and reactive " to light.   Neck:      Thyroid: No thyromegaly.   Cardiovascular:      Rate and Rhythm: Normal rate and regular rhythm.   Pulmonary:      Effort: Pulmonary effort is normal. No respiratory distress.   Musculoskeletal:         General: Normal range of motion.      Cervical back: Normal range of motion and neck supple.   Skin:     General: Skin is warm and dry.   Neurological:      Mental Status: He is alert and oriented to person, place, and time.   Psychiatric:         Behavior: Behavior normal.         Judgment: Judgment normal.            Assessment and Plan     1. Obesity, Class I, BMI 30.0-34.9 (see actual BMI)  -     Discontinue: semaglutide, weight loss, (WEGOVY) 2.4 mg/0.75 mL PnIj; Inject 2.4 mg into the skin every 7 days.  Dispense: 3 mL; Refill: 1  -     semaglutide, weight loss, (WEGOVY) 2.4 mg/0.75 mL PnIj; Inject 2.4 mg into the skin every 7 days.  Dispense: 9 mL; Refill: 1          Danilo Lockett was seen today for follow-up.    Diagnoses and all orders for this visit:    Obesity, Class I, BMI 30.0-34.9 (see actual BMI)  -     Discontinue: semaglutide, weight loss, (WEGOVY) 2.4 mg/0.75 mL PnIj; Inject 2.4 mg into the skin every 7 days.  -     semaglutide, weight loss, (WEGOVY) 2.4 mg/0.75 mL PnIj; Inject 2.4 mg into the skin every 7 days.          Wegovy2.4 mg once a week       Decrease portions as soon as you start wegovy. Avoid fried, rich or greasy foods.      Some nausea in the first 2 weeks is not unusual.     If you get pain across the upper abdomen and around to your back, please call the office.       [email or web address].[email or web address] to sign up for coupon card.     Exercise 30 min 3 days a week    Increase low impact activity as tolerated.  Avoid high impact activity, very heavy lifting or other exercise regimens that may cause discomfort.     [email or web address], [email or web address], [email or web address] for meal options.     [address] pb meal planner, meal  ideas and exercise handouts given  previously.     Holiday tips given       No follow-ups on file.

## 2023-11-28 NOTE — PATIENT INSTRUCTIONS
PLEASE ARRIVE 15-20 min EARLY FOR YOUR APPOINTMENTS. This allows us to perform all of the services for your appointment in a timely fashion. Arriving late for your appointment not only decreases the time available for the doctor to see you, it also leads to delays for every other patient scheduled after you. Patients arriving after their appointment time may be asked to wait until all patients that arrived on time are seen, and/or there is another available slot for you to be worked in. When this is not possible, you may be asked to reschedule.   Thank you for your consideration in this matter.         Wegovy2.4 mg once a week       Decrease portions as soon as you start wegovy. Avoid fried, rich or greasy foods.      Some nausea in the first 2 weeks is not unusual.     If you get pain across the upper abdomen and around to your back, please call the office.       Www[employer] to sign up for coupon card.     Exercise 30 min 3 days a week    Increase low impact activity as tolerated.  Avoid high impact activity, very heavy lifting or other exercise regimens that may cause discomfort.     [email or web address], [email or web address], [email or web address] for meal options.     [address] pb meal planner, meal  ideas and exercise handouts given previously.       Helpful tips to survive the holidays:  Dont save yourself for the meal: Make sure you continue to eat high protein small meals every 3-4 hours to ensure to do not become over-hungry. Avoid temptation by not showing up to a holiday party or gathering hungry.   Plan ahead. Bring a dish to a party if you know there may not be an appropriate option.   Choose sugar-free drinks: Stick to water or other sugar-free beverages and make sure you are getting 6-8 cups of fluid each day (but not with meals!). Avoid alcohol, carbonated beverages, and high-fat/high-sugar beverages like hot chocolate and eggnog. Try sugar-free hot cocoa made with skim milk or  water, or sugar-free spiced tea to add some holiday flair to your beverage (see sugar-free mulled cider recipe below)  Take your time: Eat mindfully. Dont graze on food throughout the day. Sit down to enjoy your small meals. Chew slowly and thoroughly. Cut your food into small pieces before eating.  Listen to your body: Stop eating as soon as you feel full. Do not feel pressured to try certain (or all) foods or to eat all of the food on your plate. Listen to your hunger cues.   REMEMBER: Make your holidays about spending time with family and friends instead of focusing gatherings around food.  Keep up your exercise routine: Make sure you continue to get regular exercise throughout the holiday season. Encourage friends and family to be active by taking a walk together after a meal, to look at holiday lights, or to window-shop.    Good Holiday Meal Options:  Roasted Turkey, NO skin. Use low sodium broth instead of gravy.   Stuffed Bell Peppers made WITHOUT bread crumbs or Rice. Try using parmesan cheese instead  Gumbo, NO rice. Try picking out mostly the meat/seafood and vegetables with little broth.   Green Bean Casserole made with 98% fat free cream of mushroom soup and crushed almonds/pecans instead of fried onions  Side salad w/ low fat dressing. Try a different kind of salad maybe use Kale or spinach.   Roasted non-starchy vegetables like brussel sprouts, broccoli, green beans, zucchini, butternut squash, cauliflower  Cauliflower Mash (steam or roast cauliflower, puree w/ low fat cheese, dash of fat free milk and 2-3 sprays of I cant believe its not butter spray. Add garlic powder and black pepper to season). Use Low sodium broth instead of gravy.   Try Loaded Cauliflower Mash (Make cauliflower like above cauliflower mash. Top with diced turkey galloway, ¼ cup low fat cheddar cheese and bake @ 350* F for 5-10 minutes, until cheese is melted. Top with minced chives, black pepper and garlic to taste).   Homemade  cranberry sauce using Splenda or another alternative sweetener. Boil fresh cranberries and add splenda to taste. Boil until cranberries break open and then simmer until it reaches the consistency you want (less time for more watery sauce and simmer for longer to create a thicker sauce).   Deviled eggs: make using low fat knight, mustard, DILL relish (not sweet relish).   Vegetable tray w/ Greek yogurt Ranch Dip. Mix 1 packet of [place] ranch dip mix w/ 16 oz low fat plain greek yogurt.     Good Holiday Dessert Options:  High protein Pumpkin Cheesecake (see recipe below)  Pumpkin Whip (see recipe below)  Quest Apple Pie or Cinnamon Roll flavored protein bar (warm in microwave for 10-15 seconds)  Eggnog Protein shake (see recipe below)  Fresh fruit w/ low fat cheese  Sugar-free Jello Parfaits. Layer Red and Green sugar-free jello in cups and top w/ 2 tbsp Sugar-free cool-whip    Pumpkin Cheesecake    8 ounces fat free cream cheese, softened   2 scoops of vanilla protein powder (<4 g sugar per serving)   ¼ tsp Fine salt   2 eggs, at room temperature   1/3 cup fat free sour cream  1/3 cup fat free half and half  1 15 -ounce can pure pumpkin puree   1 tablespoon pumpkin pie spice, plus more for dusting   Unsalted nuts, crushed  *Add splenda to taste    Directions     1. Preheat the oven to 300 degrees F. Line 18 muffin cups with paper liners. Sprinkle 1 tsp crushed unsalted nuts at the bottom of each of muffin cup liner.     2. In a large bowl, beat the cream cheese, vanilla protein powder and 1/4 teaspoon fine salt on medium-high speed until smooth and creamy, 2 to 3 minutes. Scrape down the sides, reduce speed to low and beat in the eggs, 1 at a time, until combined. Beat in 1/3 cup fat free sour cream and fat free half and half. Stir in the pumpkin puree and pumpkin pie spice until smooth. Divide evenly among cookie-lined paper cups, filling almost all the way to the top.     3. Bake until the filling is just set,  40 to 45 minutes. A sharp knife inserted into the center will come out moist, but clean. Cool completely in tins on a wire rack. Refrigerate until cold, 4 hours, or overnight. Top with a dusting of pumpkin pie spice.    Recipe altered from the following recipe: http://[email or web address].BLOVES/recipes/food-network-silviano/mini-pumpkin-cheesecakes-recipe.print.html?oc=linkback    Pumpkin Whip    Box of sugar-free vanilla pudding  Can of pumpkin puree  Pumpkin Pie spice (sprinkle to taste)  ½ cup of sugar-free Cool Whip    Directions:  Make sugar-free pudding according to package directions using fat free or 1% milk. Stir in pumpkin and cool whip. Add pumpkin pie spice to taste.     Egg Nog Protein shake    8 oz skim or 1% milk  1 scoop vanilla protein powder  1 tbsp sugar-free vanilla pudding mix  ½ tsp butter flavor extract  ½ tsp rum extract  ½ tsp cinnamon     Shake together or blend with ice and serve.     Sugar-Free Mulled Cider    3 oz diet cran-apple juice  6 oz water  1 packet sugar-free apple cider mix  ½ tsp apple pie spice  ½ tsp butter flavor extract  1 tbsp Sugar-free Syrup    Mix together. Warm if needed and serve w/ orange wedge and cinnamon stick.

## 2023-12-05 ENCOUNTER — PATIENT MESSAGE (OUTPATIENT)
Dept: ENDOCRINOLOGY | Facility: CLINIC | Age: 43
End: 2023-12-05
Payer: COMMERCIAL

## 2024-03-09 ENCOUNTER — HOSPITAL ENCOUNTER (OUTPATIENT)
Facility: HOSPITAL | Age: 44
Discharge: HOME | End: 2024-03-12
Attending: STUDENT IN AN ORGANIZED HEALTH CARE EDUCATION/TRAINING PROGRAM | Admitting: HOSPITALIST
Payer: COMMERCIAL

## 2024-03-09 DIAGNOSIS — L03.90 CELLULITIS: Primary | ICD-10-CM

## 2024-03-09 DIAGNOSIS — L03.116 LEFT LEG CELLULITIS: ICD-10-CM

## 2024-03-09 DIAGNOSIS — M62.838 MUSCLE SPASMS OF LOWER EXTREMITY: ICD-10-CM

## 2024-03-09 PROBLEM — A41.9 SEPSIS DUE TO CELLULITIS: Status: ACTIVE | Noted: 2024-03-09

## 2024-03-09 PROBLEM — E66.9 OBESITY (BMI 30-39.9): Chronic | Status: ACTIVE | Noted: 2023-06-21

## 2024-03-09 LAB
ALBUMIN SERPL BCP-MCNC: 4.4 G/DL (ref 3.5–5.2)
ALP SERPL-CCNC: 91 U/L (ref 55–135)
ALT SERPL W/O P-5'-P-CCNC: 17 U/L (ref 10–44)
ANION GAP SERPL CALC-SCNC: 14 MMOL/L (ref 8–16)
AST SERPL-CCNC: 19 U/L (ref 10–40)
BASOPHILS # BLD AUTO: 0.05 K/UL (ref 0–0.2)
BASOPHILS NFR BLD: 0.3 % (ref 0–1.9)
BILIRUB SERPL-MCNC: 3.1 MG/DL (ref 0.1–1)
BUN SERPL-MCNC: 21 MG/DL (ref 6–20)
BUN SERPL-MCNC: 21 MG/DL (ref 6–30)
CALCIUM SERPL-MCNC: 10 MG/DL (ref 8.7–10.5)
CHLORIDE SERPL-SCNC: 96 MMOL/L (ref 95–110)
CHLORIDE SERPL-SCNC: 99 MMOL/L (ref 95–110)
CK SERPL-CCNC: 99 U/L (ref 20–200)
CO2 SERPL-SCNC: 22 MMOL/L (ref 23–29)
CREAT SERPL-MCNC: 1.2 MG/DL (ref 0.5–1.4)
CREAT SERPL-MCNC: 1.2 MG/DL (ref 0.5–1.4)
CRP SERPL-MCNC: 293.7 MG/L (ref 0–8.2)
DIFFERENTIAL METHOD BLD: ABNORMAL
EOSINOPHIL # BLD AUTO: 0 K/UL (ref 0–0.5)
EOSINOPHIL NFR BLD: 0 % (ref 0–8)
ERYTHROCYTE [DISTWIDTH] IN BLOOD BY AUTOMATED COUNT: 12.8 % (ref 11.5–14.5)
EST. GFR  (NO RACE VARIABLE): >60 ML/MIN/1.73 M^2
GLUCOSE SERPL-MCNC: 106 MG/DL (ref 70–110)
GLUCOSE SERPL-MCNC: 96 MG/DL (ref 70–110)
HCT VFR BLD AUTO: 45.8 % (ref 40–54)
HCT VFR BLD CALC: 43 %PCV (ref 36–54)
HCV AB SERPL QL IA: NORMAL
HGB BLD-MCNC: 16 G/DL (ref 14–18)
HIV 1+2 AB+HIV1 P24 AG SERPL QL IA: NORMAL
IMM GRANULOCYTES # BLD AUTO: 0.07 K/UL (ref 0–0.04)
IMM GRANULOCYTES NFR BLD AUTO: 0.5 % (ref 0–0.5)
LACTATE SERPL-SCNC: 2.2 MMOL/L (ref 0.5–2.2)
LYMPHOCYTES # BLD AUTO: 2.1 K/UL (ref 1–4.8)
LYMPHOCYTES NFR BLD: 14.2 % (ref 18–48)
MAGNESIUM SERPL-MCNC: 1.7 MG/DL (ref 1.6–2.6)
MCH RBC QN AUTO: 30 PG (ref 27–31)
MCHC RBC AUTO-ENTMCNC: 34.9 G/DL (ref 32–36)
MCV RBC AUTO: 86 FL (ref 82–98)
MONOCYTES # BLD AUTO: 0.7 K/UL (ref 0.3–1)
MONOCYTES NFR BLD: 4.9 % (ref 4–15)
NEUTROPHILS # BLD AUTO: 12 K/UL (ref 1.8–7.7)
NEUTROPHILS NFR BLD: 80.1 % (ref 38–73)
NRBC BLD-RTO: 0 /100 WBC
PLATELET # BLD AUTO: 162 K/UL (ref 150–450)
PMV BLD AUTO: 10.1 FL (ref 9.2–12.9)
POC IONIZED CALCIUM: 1.04 MMOL/L (ref 1.06–1.42)
POC TCO2 (MEASURED): 25 MMOL/L (ref 23–27)
POTASSIUM BLD-SCNC: 3.2 MMOL/L (ref 3.5–5.1)
POTASSIUM SERPL-SCNC: 3.8 MMOL/L (ref 3.5–5.1)
PROT SERPL-MCNC: 8.5 G/DL (ref 6–8.4)
RBC # BLD AUTO: 5.33 M/UL (ref 4.6–6.2)
SAMPLE: ABNORMAL
SODIUM BLD-SCNC: 136 MMOL/L (ref 136–145)
SODIUM SERPL-SCNC: 135 MMOL/L (ref 136–145)
WBC # BLD AUTO: 15.04 K/UL (ref 3.9–12.7)

## 2024-03-09 PROCEDURE — 86140 C-REACTIVE PROTEIN: CPT | Performed by: PHYSICIAN ASSISTANT

## 2024-03-09 PROCEDURE — 83605 ASSAY OF LACTIC ACID: CPT | Performed by: PHYSICIAN ASSISTANT

## 2024-03-09 PROCEDURE — 96366 THER/PROPH/DIAG IV INF ADDON: CPT

## 2024-03-09 PROCEDURE — G0378 HOSPITAL OBSERVATION PER HR: HCPCS

## 2024-03-09 PROCEDURE — 25000003 PHARM REV CODE 250: Performed by: HOSPITALIST

## 2024-03-09 PROCEDURE — 96365 THER/PROPH/DIAG IV INF INIT: CPT

## 2024-03-09 PROCEDURE — 86803 HEPATITIS C AB TEST: CPT | Performed by: PHYSICIAN ASSISTANT

## 2024-03-09 PROCEDURE — 87389 HIV-1 AG W/HIV-1&-2 AB AG IA: CPT | Performed by: PHYSICIAN ASSISTANT

## 2024-03-09 PROCEDURE — 85025 COMPLETE CBC W/AUTO DIFF WBC: CPT | Performed by: PHYSICIAN ASSISTANT

## 2024-03-09 PROCEDURE — 96376 TX/PRO/DX INJ SAME DRUG ADON: CPT

## 2024-03-09 PROCEDURE — 25000003 PHARM REV CODE 250: Performed by: PHYSICIAN ASSISTANT

## 2024-03-09 PROCEDURE — 63600175 PHARM REV CODE 636 W HCPCS: Performed by: STUDENT IN AN ORGANIZED HEALTH CARE EDUCATION/TRAINING PROGRAM

## 2024-03-09 PROCEDURE — 96375 TX/PRO/DX INJ NEW DRUG ADDON: CPT | Mod: 59

## 2024-03-09 PROCEDURE — 82550 ASSAY OF CK (CPK): CPT | Performed by: PHYSICIAN ASSISTANT

## 2024-03-09 PROCEDURE — 83735 ASSAY OF MAGNESIUM: CPT | Performed by: PHYSICIAN ASSISTANT

## 2024-03-09 PROCEDURE — 93005 ELECTROCARDIOGRAM TRACING: CPT

## 2024-03-09 PROCEDURE — 87040 BLOOD CULTURE FOR BACTERIA: CPT | Mod: 59 | Performed by: PHYSICIAN ASSISTANT

## 2024-03-09 PROCEDURE — 25000003 PHARM REV CODE 250: Performed by: STUDENT IN AN ORGANIZED HEALTH CARE EDUCATION/TRAINING PROGRAM

## 2024-03-09 PROCEDURE — 25500020 PHARM REV CODE 255: Performed by: STUDENT IN AN ORGANIZED HEALTH CARE EDUCATION/TRAINING PROGRAM

## 2024-03-09 PROCEDURE — 93010 ELECTROCARDIOGRAM REPORT: CPT | Mod: ,,, | Performed by: INTERNAL MEDICINE

## 2024-03-09 PROCEDURE — 96361 HYDRATE IV INFUSION ADD-ON: CPT

## 2024-03-09 PROCEDURE — 99285 EMERGENCY DEPT VISIT HI MDM: CPT | Mod: 25

## 2024-03-09 PROCEDURE — 63600175 PHARM REV CODE 636 W HCPCS: Performed by: PHYSICIAN ASSISTANT

## 2024-03-09 PROCEDURE — 80053 COMPREHEN METABOLIC PANEL: CPT | Performed by: PHYSICIAN ASSISTANT

## 2024-03-09 RX ORDER — ACETAMINOPHEN 325 MG/1
650 TABLET ORAL
Status: COMPLETED | OUTPATIENT
Start: 2024-03-09 | End: 2024-03-09

## 2024-03-09 RX ORDER — FINASTERIDE 1 MG/1
1 TABLET, FILM COATED ORAL DAILY
COMMUNITY

## 2024-03-09 RX ORDER — MINOXIDIL 2 %
1 SOLUTION, NON-ORAL TOPICAL 2 TIMES DAILY
COMMUNITY

## 2024-03-09 RX ORDER — MORPHINE SULFATE 4 MG/ML
4 INJECTION, SOLUTION INTRAMUSCULAR; INTRAVENOUS
Status: COMPLETED | OUTPATIENT
Start: 2024-03-09 | End: 2024-03-09

## 2024-03-09 RX ORDER — MUPIROCIN 20 MG/G
OINTMENT TOPICAL 2 TIMES DAILY
Status: DISCONTINUED | OUTPATIENT
Start: 2024-03-09 | End: 2024-03-12 | Stop reason: HOSPADM

## 2024-03-09 RX ORDER — ONDANSETRON HYDROCHLORIDE 2 MG/ML
4 INJECTION, SOLUTION INTRAVENOUS
Status: COMPLETED | OUTPATIENT
Start: 2024-03-09 | End: 2024-03-09

## 2024-03-09 RX ORDER — SODIUM CHLORIDE 0.9 % (FLUSH) 0.9 %
10 SYRINGE (ML) INJECTION
Status: CANCELLED | OUTPATIENT
Start: 2024-03-09

## 2024-03-09 RX ORDER — DIPHENHYDRAMINE HCL 25 MG
25 CAPSULE ORAL EVERY 6 HOURS PRN
Status: DISCONTINUED | OUTPATIENT
Start: 2024-03-09 | End: 2024-03-12 | Stop reason: HOSPADM

## 2024-03-09 RX ORDER — OXYCODONE HYDROCHLORIDE 5 MG/1
5 TABLET ORAL ONCE AS NEEDED
Status: COMPLETED | OUTPATIENT
Start: 2024-03-09 | End: 2024-03-09

## 2024-03-09 RX ORDER — IBUPROFEN 600 MG/1
600 TABLET ORAL EVERY 6 HOURS PRN
Status: DISCONTINUED | OUTPATIENT
Start: 2024-03-09 | End: 2024-03-12 | Stop reason: HOSPADM

## 2024-03-09 RX ORDER — ACETAMINOPHEN 325 MG/1
650 TABLET ORAL EVERY 6 HOURS PRN
Status: DISCONTINUED | OUTPATIENT
Start: 2024-03-09 | End: 2024-03-12 | Stop reason: HOSPADM

## 2024-03-09 RX ORDER — CALCIUM CARBONATE 200(500)MG
500 TABLET,CHEWABLE ORAL 3 TIMES DAILY PRN
Status: DISCONTINUED | OUTPATIENT
Start: 2024-03-09 | End: 2024-03-12 | Stop reason: HOSPADM

## 2024-03-09 RX ORDER — DIAZEPAM 10 MG/2ML
5 INJECTION INTRAMUSCULAR
Status: DISCONTINUED | OUTPATIENT
Start: 2024-03-09 | End: 2024-03-09

## 2024-03-09 RX ORDER — DOXYCYCLINE HYCLATE 100 MG
100 TABLET ORAL
Status: COMPLETED | OUTPATIENT
Start: 2024-03-09 | End: 2024-03-09

## 2024-03-09 RX ORDER — CLINDAMYCIN PHOSPHATE 600 MG/50ML
600 INJECTION, SOLUTION INTRAVENOUS
Status: COMPLETED | OUTPATIENT
Start: 2024-03-09 | End: 2024-03-09

## 2024-03-09 RX ORDER — KETOROLAC TROMETHAMINE 10 MG/1
10 TABLET, FILM COATED ORAL EVERY 6 HOURS PRN
Status: DISCONTINUED | OUTPATIENT
Start: 2024-03-09 | End: 2024-03-12 | Stop reason: HOSPADM

## 2024-03-09 RX ORDER — TALC
6 POWDER (GRAM) TOPICAL NIGHTLY PRN
Status: CANCELLED | OUTPATIENT
Start: 2024-03-09

## 2024-03-09 RX ORDER — ONDANSETRON 8 MG/1
8 TABLET, ORALLY DISINTEGRATING ORAL EVERY 8 HOURS PRN
Status: DISCONTINUED | OUTPATIENT
Start: 2024-03-09 | End: 2024-03-12 | Stop reason: HOSPADM

## 2024-03-09 RX ADMIN — ONDANSETRON 4 MG: 2 INJECTION INTRAMUSCULAR; INTRAVENOUS at 03:03

## 2024-03-09 RX ADMIN — ACETAMINOPHEN 650 MG: 325 TABLET ORAL at 09:03

## 2024-03-09 RX ADMIN — DOXYCYCLINE HYCLATE 100 MG: 100 TABLET, COATED ORAL at 03:03

## 2024-03-09 RX ADMIN — CLINDAMYCIN IN 5 PERCENT DEXTROSE 600 MG: 12 INJECTION, SOLUTION INTRAVENOUS at 03:03

## 2024-03-09 RX ADMIN — MORPHINE SULFATE 4 MG: 4 INJECTION INTRAVENOUS at 11:03

## 2024-03-09 RX ADMIN — SODIUM CHLORIDE, POTASSIUM CHLORIDE, SODIUM LACTATE AND CALCIUM CHLORIDE 1000 ML: 600; 310; 30; 20 INJECTION, SOLUTION INTRAVENOUS at 11:03

## 2024-03-09 RX ADMIN — OXYCODONE 5 MG: 5 TABLET ORAL at 10:03

## 2024-03-09 RX ADMIN — VANCOMYCIN HYDROCHLORIDE 2000 MG: 10 INJECTION, POWDER, LYOPHILIZED, FOR SOLUTION INTRAVENOUS at 12:03

## 2024-03-09 RX ADMIN — SODIUM CHLORIDE, POTASSIUM CHLORIDE, SODIUM LACTATE AND CALCIUM CHLORIDE 1000 ML: 600; 310; 30; 20 INJECTION, SOLUTION INTRAVENOUS at 03:03

## 2024-03-09 RX ADMIN — IOHEXOL 100 ML: 350 INJECTION, SOLUTION INTRAVENOUS at 02:03

## 2024-03-09 RX ADMIN — ACETAMINOPHEN 650 MG: 325 TABLET ORAL at 11:03

## 2024-03-09 RX ADMIN — ONDANSETRON 4 MG: 2 INJECTION INTRAMUSCULAR; INTRAVENOUS at 11:03

## 2024-03-09 NOTE — ED PROVIDER NOTES
Encounter Date: 3/9/2024       History     Chief Complaint   Patient presents with    Leg Pain     L lower leg pain , swelling and redness, had food poisoning 2 d ago     43-year-old male with history of stable angina presents for complaints.  Symptoms started drinking a frosty from [employer] with nausea vomiting and diarrhea.  Patient states that his son had the same symptoms and attributed this to food poisoning.  However, today he started to have severe cramping in his bilateral legs and a rash on his left leg.  He reports associated chills and numbness in his arm.  Noted to have a low-grade fever upon arrival in the ED. denies abdominal pain, weakness prior similar episodes.  States that he was started on Wegovy recently and thinks this might be contributing to electrolyte issues.      Review of patient's allergies indicates:  No Known Allergies  Past Medical History:   Diagnosis Date    Genital herpes     Stable angina 07/03/2017     Past Surgical History:   Procedure Laterality Date    cardiac cath  07/03/2017    stable angina, Dr. Dobbins    COLONOSCOPY N/A 6/16/2023    Procedure: COLONOSCOPY;  Surgeon: Isaac Reyez MD;  Location: [address];  Service: Endoscopy;  Laterality: N/A;  golyte prep  prep instructions sent to pt via portal -[employer]  timing 1-4 weeks  6/12 - Precall: no answer. SG    NASAL SEPTUM SURGERY      TONSILLECTOMY       Family History   Problem Relation Age of Onset    Heart disease Father         40s    Heart disease Maternal Grandfather     Heart disease Paternal Grandfather     Heart disease Paternal Uncle     Cirrhosis Paternal Uncle      Social History     Tobacco Use    Smoking status: Never     Passive exposure: Never    Smokeless tobacco: Never   Substance Use Topics    Alcohol use: Yes     Comment: once a month max    Drug use: No     Review of Systems    Physical Exam     Initial Vitals [03/09/24 0849]   BP Pulse Resp Temp SpO2   112/72 100 16 100.1 °F (37.8 °C) 99 %       MAP       --         Physical Exam    Nursing note and vitals reviewed.  Constitutional: He appears well-developed and well-nourished. He is not diaphoretic. He appears ill. No distress.   HENT:   Head: Normocephalic and atraumatic.   Cardiovascular:  Normal rate, regular rhythm, normal heart sounds and intact distal pulses.     Exam reveals no gallop and no friction rub.       No murmur heard.  Pulmonary/Chest: Breath sounds normal. No respiratory distress. He has no wheezes. He has no rhonchi. He has no rales. He exhibits no tenderness.   Musculoskeletal:         General: Normal range of motion.      Comments: Thigh appears normal without swelling but exquisitely tender along the medial I with firmness underlying.  Compartments are soft.     Neurological: He is alert and oriented to person, place, and time.   Skin: Skin is warm and dry. Rash noted. There is erythema. There is pallor.   Erythema of the left lower leg.  Tender to palpation.  Please see photo below.   Psychiatric: He has a normal mood and affect.         ED Course   Procedures  Labs Reviewed   CBC W/ AUTO DIFFERENTIAL - Abnormal; Notable for the following components:       Result Value    WBC 15.04 (*)     Gran # (ANC) 12.0 (*)     Immature Grans (Abs) 0.07 (*)     Gran % 80.1 (*)     Lymph % 14.2 (*)     All other components within normal limits   COMPREHENSIVE METABOLIC PANEL - Abnormal; Notable for the following components:    Sodium 135 (*)     CO2 22 (*)     BUN 21 (*)     Total Protein 8.5 (*)     Total Bilirubin 3.1 (*)     All other components within normal limits   C-REACTIVE PROTEIN - Abnormal; Notable for the following components:    .7 (*)     All other components within normal limits   ISTAT PROCEDURE - Abnormal; Notable for the following components:    POC Potassium 3.2 (*)     POC Ionized Calcium 1.04 (*)     All other components within normal limits   CULTURE, BLOOD   CULTURE, BLOOD   HIV 1 / 2 ANTIBODY    Narrative:      Release to patient->Immediate   HEPATITIS C ANTIBODY    Narrative:     Release to patient->Immediate   LACTIC ACID, PLASMA   MAGNESIUM   CK   ISTAT CHEM8          Imaging Results              CT Thigh With Contrast Left (Final result)  Result time 03/09/24 14:47:03      Final result by Rubia Rogers MD (03/09/24 14:47:03)                   Impression:      Nonspecific reactive nodes in the left inguinal region associated with mild soft tissue edema of the anterior superior left thigh.    Mild nonspecific circumferential subcutaneous soft tissue edema of the distal left lower extremity.      Electronically signed by: Rubia Rogers MD  Date:    03/09/2024  Time:    14:47               Narrative:    EXAMINATION:  CT LEG (TIBIA-FIBULA) WITH CONTRAST LEFT; CT THIGH WITH CONTRAST LEFT    CLINICAL HISTORY:  Soft tissue infection suspected, lower leg, xray done;; Soft tissue infection suspected, thigh, xray done;    TECHNIQUE:  1.25 mm axial images of the left proximal and distal extremity acquired after the administration 100 cc of Omni 350 IV contrast, coronal and sagittal images reformatted.    COMPARISON:  None    FINDINGS:  Slightly enlarged left inguinal nodes, an index node 1.4 cm (2:249) short axis.    Subtle subcutaneous soft tissue edema proximal anterior thigh.    Subtle circumferential subcutaneous soft tissue edema of the distal left lower extremity/calf region.    There is no soft tissue abscess.  No osseous abnormality seen.    No abnormal enhancement of the musculature or intermuscular fascia region.    The remainder of the visualized osseous structures and soft tissues appear normal.    No soft tissue air, no foreign body.                                       CT Leg (Tibia-Fibula) Wtih Contrast Left (Final result)  Result time 03/09/24 14:47:03      Final result by Rubia Rogers MD (03/09/24 14:47:03)                   Impression:      Nonspecific reactive nodes in the left inguinal  region associated with mild soft tissue edema of the anterior superior left thigh.    Mild nonspecific circumferential subcutaneous soft tissue edema of the distal left lower extremity.      Electronically signed by: Rubia Rogers MD  Date:    03/09/2024  Time:    14:47               Narrative:    EXAMINATION:  CT LEG (TIBIA-FIBULA) WITH CONTRAST LEFT; CT THIGH WITH CONTRAST LEFT    CLINICAL HISTORY:  Soft tissue infection suspected, lower leg, xray done;; Soft tissue infection suspected, thigh, xray done;    TECHNIQUE:  1.25 mm axial images of the left proximal and distal extremity acquired after the administration 100 cc of Omni 350 IV contrast, coronal and sagittal images reformatted.    COMPARISON:  None    FINDINGS:  Slightly enlarged left inguinal nodes, an index node 1.4 cm (2:249) short axis.    Subtle subcutaneous soft tissue edema proximal anterior thigh.    Subtle circumferential subcutaneous soft tissue edema of the distal left lower extremity/calf region.    There is no soft tissue abscess.  No osseous abnormality seen.    No abnormal enhancement of the musculature or intermuscular fascia region.    The remainder of the visualized osseous structures and soft tissues appear normal.    No soft tissue air, no foreign body.                                       X-Ray Tibia Fibula 2 View Left (Final result)  Result time 03/09/24 13:31:01      Final result by Chandana Lopez MD (03/09/24 13:31:01)                   Impression:      Please see discussion above.      Electronically signed by: Chandana Lopez  Date:    03/09/2024  Time:    13:31               Narrative:    EXAMINATION:  XR TIBIA FIBULA 2 VIEW LEFT; XR FEMUR 2 VIEW LEFT; XR FOOT COMPLETE 3 VIEW LEFT    CLINICAL HISTORY:  Cellulitis, unspecified    TECHNIQUE:  AP and lateral views of the left tibia and fibula were performed.    Four views left femur.    Three views left foot.    COMPARISON:  None.    FINDINGS:  No definite evidence of  acute fracture or dislocation.  Visualized joint spaces appear grossly maintained.    No definite acute appearing erosive change or periosteal reaction.    No definite radiopaque foreign body.    Query mild DJD of the left hip joint.    In the foot, Lisfranc joint appears congruent.  Joint spaces appear maintained.    Reticulation of the subcutaneous fat planes could relate to edema, noting that cellulitis could appear similar in the given context.                                       X-Ray Femur Ap/Lat Left (Final result)  Result time 03/09/24 13:31:01      Final result by Chandana Lopez MD (03/09/24 13:31:01)                   Impression:      Please see discussion above.      Electronically signed by: Chandana Lopez  Date:    03/09/2024  Time:    13:31               Narrative:    EXAMINATION:  XR TIBIA FIBULA 2 VIEW LEFT; XR FEMUR 2 VIEW LEFT; XR FOOT COMPLETE 3 VIEW LEFT    CLINICAL HISTORY:  Cellulitis, unspecified    TECHNIQUE:  AP and lateral views of the left tibia and fibula were performed.    Four views left femur.    Three views left foot.    COMPARISON:  None.    FINDINGS:  No definite evidence of acute fracture or dislocation.  Visualized joint spaces appear grossly maintained.    No definite acute appearing erosive change or periosteal reaction.    No definite radiopaque foreign body.    Query mild DJD of the left hip joint.    In the foot, Lisfranc joint appears congruent.  Joint spaces appear maintained.    Reticulation of the subcutaneous fat planes could relate to edema, noting that cellulitis could appear similar in the given context.                                       X-Ray Foot Complete Left (Final result)  Result time 03/09/24 13:31:01      Final result by Chandana Lopez MD (03/09/24 13:31:01)                   Impression:      Please see discussion above.      Electronically signed by: Chandana Lopez  Date:    03/09/2024  Time:    13:31               Narrative:     EXAMINATION:  XR TIBIA FIBULA 2 VIEW LEFT; XR FEMUR 2 VIEW LEFT; XR FOOT COMPLETE 3 VIEW LEFT    CLINICAL HISTORY:  Cellulitis, unspecified    TECHNIQUE:  AP and lateral views of the left tibia and fibula were performed.    Four views left femur.    Three views left foot.    COMPARISON:  None.    FINDINGS:  No definite evidence of acute fracture or dislocation.  Visualized joint spaces appear grossly maintained.    No definite acute appearing erosive change or periosteal reaction.    No definite radiopaque foreign body.    Query mild DJD of the left hip joint.    In the foot, Lisfranc joint appears congruent.  Joint spaces appear maintained.    Reticulation of the subcutaneous fat planes could relate to edema, noting that cellulitis could appear similar in the given context.                                       Medications   Tdap (BOOSTRIX) vaccine injection 0.5 mL (has no administration in time range)   lactated ringers bolus 1,000 mL (0 mLs Intravenous Stopped 3/9/24 1242)   acetaminophen tablet 650 mg (650 mg Oral Given 3/9/24 1145)   clindamycin in D5W 600 mg/50 mL IVPB 600 mg (600 mg Intravenous New Bag 3/9/24 1522)   vancomycin 2 g in dextrose 5 % 500 mL IVPB (0 mg Intravenous Stopped 3/9/24 1417)   morphine injection 4 mg (4 mg Intravenous Given 3/9/24 1150)   ondansetron injection 4 mg (4 mg Intravenous Given 3/9/24 1150)   iohexoL (OMNIPAQUE 350) injection 100 mL (100 mLs Intravenous Given 3/9/24 1422)   ondansetron injection 4 mg (4 mg Intravenous Given 3/9/24 1518)   doxycycline tablet 100 mg (100 mg Oral Given 3/9/24 1518)   lactated ringers bolus 1,000 mL (1,000 mLs Intravenous New Bag 3/9/24 1517)     Medical Decision Making  43-year-old male presenting for leg with your thigh pain, chills and vomiting.  Temp elevated 100.1° F, with otherwise normal vitals he appears pale and ill.    Differential diagnosis:  Cellulitis   His thigh is exquisitely tender to palpation, I am concerned this represents  ascending infection  Not overtly septic but does appear ill, will do septic workup   No obvious abscess   Doubt DVT   Dehydration   Electrolyte derangement    Will check labs, give mycin, clindamycin for toxin release, fluids, analgesics, obtain imaging and reassess.    Labs are notable for leukocytosis and significantly elevated inflammatory markers.  Imaging shows findings symptoms cellulitis without gas.  However, given rapid progression of infection, general surgery consulted who evaluated the patient and do not think his presentation consistent with necrotizing fasciitis.  He will be admitted to Hospital Medicine for further management.  Patient comfortable with admission.  I discussed this patient with my supervising physician who also evaluated this patient.    Amount and/or Complexity of Data Reviewed  Labs: ordered. Decision-making details documented in ED Course.  Radiology: ordered and independent interpretation performed. Decision-making details documented in ED Course.    Risk  OTC drugs.  Prescription drug management.  Parenteral controlled substances.  Decision regarding hospitalization.               ED Course as of 03/09/24 1617   Sat Mar 09, 2024   1140 WBC(!): 15.04 [CC]   1153 Lactic Acid Level: 2.2 [CC]   1208 Magnesium : 1.7 [CC]   1208 Creatinine: 1.2 [CC]   1208 BILIRUBIN TOTAL(!): 3.1 [CC]   1208 Potassium: 3.8 [CC]   1227 CRP(!): 293.7 [CC]   1315 X-Ray Femur Ap/Lat Left  Per my independent interpretation, no soft tissue gas [CC]   145 I called the pharmacy to see what the delay is and receiving clindamycin.  They said that it is just now being sent, had previously been marked for a pyxis?  [CC]   1453 I went to go reassess the patient when informed by RN that streaming erythema encroaching up the knee.  Although his clinical presentation seems inconsistent with necrotizing fasciitis, will consult general surgery given rapidly progressive infection [CC]   1507 Case discussed with General  surgery who will evaluate the patient [CC]      ED Course User Index  [CC] Manju Kellogg PA-C                           Clinical Impression:  Final diagnoses:  [M62.838] Muscle spasms of lower extremity  [L03.90] Cellulitis (Primary)          ED Disposition Condition    Observation Stable                Manju Kellogg PA-C  03/09/24 0878

## 2024-03-09 NOTE — SUBJECTIVE & OBJECTIVE
No current facility-administered medications on file prior to encounter.     Current Outpatient Medications on File Prior to Encounter   Medication Sig    rosuvastatin (CRESTOR) 10 MG tablet Take 1 tablet (10 mg total) by mouth once daily.    semaglutide, weight loss, (WEGOVY) 2.4 mg/0.75 mL PnIj Inject 2.4 mg into the skin every 7 days.    valACYclovir (VALTREX) 500 MG tablet TAKE 1 TABLET BY MOUTH TWICE DAILY FOR OUTBREAK/RECURRENCE EPISODES       Review of patient's allergies indicates:  No Known Allergies    Past Medical History:   Diagnosis Date    Genital herpes     Stable angina 07/03/2017     Past Surgical History:   Procedure Laterality Date    cardiac cath  07/03/2017    stable angina, Dr. Dobbins    COLONOSCOPY N/A 6/16/2023    Procedure: COLONOSCOPY;  Surgeon: Isaac Reyez MD;  Location: [address];  Service: Endoscopy;  Laterality: N/A;  golyte prep  prep instructions sent to pt via portal -JM  timing 1-4 weeks  6/12 - Precall: no answer. SG    NASAL SEPTUM SURGERY      TONSILLECTOMY       Family History       Problem Relation (Age of Onset)    Cirrhosis Paternal Uncle    Heart disease Father, Maternal Grandfather, Paternal Grandfather, Paternal Uncle          Tobacco Use    Smoking status: Never     Passive exposure: Never    Smokeless tobacco: Never   Substance and Sexual Activity    Alcohol use: Yes     Comment: once a month max    Drug use: No    Sexual activity: Yes     Review of Systems per HPI  Objective:     Vital Signs (Most Recent):  Temp: 99.9 °F (37.7 °C) (03/09/24 1203)  Pulse: 96 (03/09/24 1303)  Resp: 18 (03/09/24 1303)  BP: 95/63 (03/09/24 1303)  SpO2: 95 % (03/09/24 1303) Vital Signs (24h Range):  Temp:  [99.9 °F (37.7 °C)-100.1 °F (37.8 °C)] 99.9 °F (37.7 °C)  Pulse:  [] 96  Resp:  [16-18] 18  SpO2:  [95 %-99 %] 95 %  BP: ()/(63-72) 95/63     Weight: 108.9 kg (240 lb)  Body mass index is 30.81 kg/m².     Physical Exam  Vitals reviewed.   Constitutional:        Appearance: Normal appearance. He is obese.      Comments: No acute distress. Appears nontoxic   HENT:      Head: Normocephalic.   Eyes:      Extraocular Movements: Extraocular movements intact.      Conjunctiva/sclera: Conjunctivae normal.   Cardiovascular:      Rate and Rhythm: Normal rate and regular rhythm.   Pulmonary:      Effort: Pulmonary effort is normal. No respiratory distress.   Abdominal:      Palpations: Abdomen is soft.      Tenderness: There is no abdominal tenderness.   Musculoskeletal:         General: No signs of injury.      Cervical back: Normal range of motion and neck supple.      Comments: No significant edema in BLEs   Skin:     Comments: LLE with warmth and erythema over the anterior lower leg. Not significantly tender. No crepitus. No blisters or abscesses.     Normal ROM of the foot/ankle     2+ DP pulses bilaterally   Neurological:      General: No focal deficit present.      Mental Status: He is alert and oriented to person, place, and time.                  I have reviewed all pertinent lab results within the past 24 hours.  Labs reviewed  LRINEC score is 4--low suspicion for nec fasc    Significant Diagnostics:  I have reviewed all pertinent imaging results/findings within the past 24 hours.  CT LE without evidence of abscess, air, or fascial enhancement.

## 2024-03-09 NOTE — ED NOTES
I-STAT Chem-8+ Results:   Value Reference Range   Sodium 136 136-145 mmol/L   Potassium  3.2 3.5-5.1 mmol/L   Chloride 96  mmol/L   Ionized Calcium 1.04 1.06-1.42 mmol/L   CO2 (measured) 25 23-29 mmol/L   Glucose 106  mg/dL   BUN 21 6-30 mg/dL   Creatinine 1.2 0.5-1.4 mg/dL   Hematocrit 43 36-54%

## 2024-03-09 NOTE — H&P
[facility]  [facility] Physical    Patient Name: Danilo Zambrano  MRN: [id number]  Patient Class: OP- Observation  Admission Date: 3/9/2024  Attending Physician: Mehul Sandoval MD   Primary Care Provider: Zheng Dobbins MD (Inactive)         Patient information was obtained from patient, parent, past medical records, and ER records.     Subjective:     Principal Problem:Left leg cellulitis    Chief Complaint:   Chief Complaint   Patient presents with    Leg Pain     L lower leg pain , swelling and redness, had food poisoning 2 d ago        HPI: Danilo Zambrano is a 43 year old white man with obesity, fatty liver. He lives in [place], [place]. He is .    He presented to [facility] Emergency Department on Saturday 3/9/2024 with left leg erythema and pain associated with fevers and chills. On Wednesday he walked outside when it was raining wearing shorts and sandals. He had food poisoning on Thursday causing muscle cramps in his thighs. The pain worsened and he noticed the erythema on his left leg on the day of presentation. In the emergency department, his temperature was 100.1° Fahrenheit, his pulse was 100 bpm, and [place] [address]/uL, CRP was elevated at 293.7 mg/L, and total bilrubin was elevated at 3.1 mg/dL. He was given vancomycin, clindamycin, and doxycycline. Imaging showed cellulitis with no abscess. He was seen by General Surgery. He was admitted to Hospital Medicine Team W.    Past Medical History:   Diagnosis Date    Genital herpes     Stable angina 07/03/2017       Past Surgical History:   Procedure Laterality Date    cardiac cath  07/03/2017    stable angina, Dr. Dobbins    COLONOSCOPY N/A 6/16/2023    Procedure: COLONOSCOPY;  Surgeon: Isaac Reyez MD;  [facility] ([address] [place] [address]);  Service: Endoscopy;  Laterality: N/A;  golyte prep  prep instructions sent to pt via portal -  timing 1-4 weeks  6/12 -  Precall: no answer. SG    NASAL SEPTUM SURGERY      TONSILLECTOMY         Review of patient's allergies indicates:  No Known Allergies    No current facility-administered medications on file prior to encounter.     Current Outpatient Medications on File Prior to Encounter   Medication Sig    finasteride (PROPECIA) 1 mg tablet Take 1 mg by mouth once daily.    minoxidiL (ROGAINE) 2 % external solution Apply 1 mL topically 2 (two) times daily.    rosuvastatin (CRESTOR) 10 MG tablet Take 1 tablet (10 mg total) by mouth once daily.    semaglutide, weight loss, (WEGOVY) 2.4 mg/0.75 mL PnIj Inject 2.4 mg into the skin every 7 days.    valACYclovir (VALTREX) 500 MG tablet TAKE 1 TABLET BY MOUTH TWICE DAILY FOR OUTBREAK/RECURRENCE EPISODES     Family History       Problem Relation (Age of Onset)    Cirrhosis Paternal Uncle    Heart disease Father, Maternal Grandfather, Paternal Grandfather, Paternal Uncle          Tobacco Use    Smoking status: Never     Passive exposure: Never    Smokeless tobacco: Never   Substance and Sexual Activity    Alcohol use: Yes     Comment: once a month max    Drug use: No    Sexual activity: Yes     Review of Systems   Constitutional:  Positive for chills and fever.   HENT:  Negative for trouble swallowing and voice change.    Eyes:  Negative for pain and redness.   Respiratory:  Negative for cough and shortness of breath.    Cardiovascular:  Negative for chest pain and palpitations.   Gastrointestinal:  Negative for nausea and vomiting.   Genitourinary:  Negative for difficulty urinating and dysuria.   Musculoskeletal:  Negative for neck pain and neck stiffness.   Skin:  Positive for color change. Negative for pallor.   Neurological:  Negative for seizures and syncope.     Objective:     Vital Signs (Most Recent):  Temp: 99.9 °F (37.7 °C) (03/09/24 1203)  Pulse: 96 (03/09/24 1303)  Resp: 18 (03/09/24 1303)  BP: 95/63 (03/09/24 1303)  SpO2: 95 % (03/09/24 1303) Vital Signs (24h Range):  Temp:   [99.9 °F (37.7 °C)-100.1 °F (37.8 °C)] 99.9 °F (37.7 °C)  Pulse:  [] 96  Resp:  [16-18] 18  SpO2:  [95 %-99 %] 95 %  BP: ()/(63-72) 95/63     Weight: 108.9 kg (240 lb)  Body mass index is 30.81 kg/m².     Physical Exam  Vitals and nursing note reviewed.   Constitutional:       General: He is not in acute distress.     Appearance: He is well-developed. He is obese. He is not diaphoretic.   HENT:      Head: Normocephalic and atraumatic.   Eyes:      General: No scleral icterus.     Conjunctiva/sclera: Conjunctivae normal.   Cardiovascular:      Rate and Rhythm: Regular rhythm. Tachycardia present.      Heart sounds: Normal heart sounds. No murmur heard.  Pulmonary:      Effort: Pulmonary effort is normal. No respiratory distress.      Breath sounds: No wheezing or rales.   Abdominal:      General: There is no distension.      Palpations: Abdomen is soft.      Tenderness: There is no abdominal tenderness. There is no guarding.   Musculoskeletal:         General: No deformity or signs of injury.      Right lower leg: No edema.      Left lower leg: No edema.   Skin:     General: Skin is warm and dry.      Coloration: Skin is not jaundiced or pale.      Findings: Erythema (left leg) present.   Neurological:      Mental Status: He is alert and oriented to person, place, and time. Mental status is at baseline.   Psychiatric:         Attention and Perception: Attention normal.         Mood and Affect: Mood and affect normal.         Behavior: Behavior is cooperative.                Significant Labs: All pertinent labs within the past 24 hours have been reviewed.    Significant Imaging: I have reviewed all pertinent imaging results/findings within the past 24 hours.  CT Thigh With Contrast Left CT Leg (Tibia-Fibula) Wtih Contrast Left 3/09/24: FINDINGS:   Slightly enlarged left inguinal nodes, an index node 1.4 cm (2:249) short axis.   Subtle subcutaneous soft tissue edema proximal anterior thigh.   Subtle  circumferential subcutaneous soft tissue edema of the distal left lower extremity/calf region.   There is no soft tissue abscess.  No osseous abnormality seen.   No abnormal enhancement of the musculature or intermuscular fascia region.   The remainder of the visualized osseous structures and soft tissues appear normal.   No soft tissue air, no foreign body.   Impression:  Nonspecific reactive nodes in the left inguinal region associated with mild soft tissue edema of the anterior superior left thigh.   Mild nonspecific circumferential subcutaneous soft tissue edema of the distal left lower extremity.   Assessment/Plan:     * Left leg cellulitis  Sepsis due to cellulitis   Give vancomycin. Monitor appearance. Repeat labs in the morning.    Elevated bilirubin  Repeat labs in the morning.      Obesity (BMI 30-39.9)  He takes semagutide and is followed in [facility] clinic.      VTE Risk Mitigation (From admission, onward)      None               On 03/09/2024, patient should be placed in hospital observation services under my care.        Pharmacokinetic Initial Assessment: IV Vancomycin    Assessment/Plan:    Initiate intravenous vancomycin with loading dose of 2000 mg once followed by a maintenance dose of vancomycin 1500 mg IV every 12 hours  Desired empiric serum trough concentration is 10 to 20 mcg/mL  Draw vancomycin trough level 60 min prior to fourth dose on 3/11 at approximately 0400  Pharmacy will continue to follow and monitor vancomycin.      Please contact pharmacy at extension [phone] with any questions regarding this assessment.     Thank you for the consult,   Guille Luo       Patient brief summary:  Danilo Zambrano is a 43 y.o. male initiated on antimicrobial therapy with IV Vancomycin for treatment of suspected skin & soft tissue infection    Drug Allergies:   Review of patient's allergies indicates:  No Known Allergies    Actual Body Weight:   108.9 kg    Renal Function:   Estimated  Creatinine Clearance: 106.4 mL/min (based on SCr of 1.2 mg/dL).,     Dialysis Method (if applicable):  N/A      Mehul Sandoval MD  Department of Hospital Medicine  [facility] - Emergency Dept

## 2024-03-09 NOTE — HPI
42 yo M with BMI 30 on semaglutide who presents with pain and erythema to his LLE associated with fevers and chills. He states that he had food poisoning on Thursday, and his BLEs began to cramp following this. He continued to have pain which was worse with ambulation and with his leg in a dependent position. He had some fevers following this and noticed erythema to his anterior left shin today. He presented to the ED for further evaluation. Given concern for rapidly spreading erythema, general surgery was consulted to rule out Nec Fasc.

## 2024-03-09 NOTE — Clinical Note
Diagnosis: Cellulitis [[id number]]   Future Attending Provider: DANE CROWELL [[id number]]   Is the patient being sent to ED Observation?: No

## 2024-03-09 NOTE — ASSESSMENT & PLAN NOTE
44 yo M on semaglutide for weight loss presenting with 2 days of bilateral LE cramping and 1 day of cellulitis to his anterior left lower leg. His clinical presentation, imaging, and LRINEC score are reassuring. We have a low suspicion for a necrotizing soft tissue infection.    -No surgical intervention indicated at this time  -Serial exams with monitoring borders of erythema.   -Recommend evaluation by medicine for admission for treatment of cellulitis  -Recommend continued IV Abx at this time.  -Discontinuing Semaglutide on discharge is likely indicated

## 2024-03-09 NOTE — HPI
Danilo Zambrano is a 43 year old white man with obesity, fatty liver. He lives in [place], [place]. He is .    He presented to [facility] Emergency Department on Saturday 3/9/2024 with left leg erythema and pain associated with fevers and chills. He had been walking with sandals outside through grass and on sidewalks, and it was raining often, so he may have walked through puddles as well. He had food poisoning on Thursday causing muscle cramps in his thighs. He was feeling very ill so did not wash whatever he picked up outside off his legs. The pain worsened and he noticed the erythema on his left leg on the day of presentation. The pain was severe whenever he put weight or walked on the leg. In the emergency department, his temperature was 100.1° Fahrenheit, his pulse was 100 bpm, and his W[place] 7[address]/uL, CRP was elevated at 293.7 mg/L, and total bilrubin was elevated at 3.1 mg/dL. He was given vancomycin, clindamycin, and doxycycline. Imaging showed cellulitis with no abscess. He was seen by General Surgery. He was admitted to Hospital Medicine Team HARJIT

## 2024-03-09 NOTE — ED TRIAGE NOTES
Danilo Zambrano, a 43 y.o. male presents to the ED w/ complaint of lrg pain,    Triage note:  Chief Complaint   Patient presents with    Leg Pain     L lower leg pain , swelling and redness, had food poisoning 2 d ago     Review of patient's allergies indicates:  No Known Allergies  Past Medical History:   Diagnosis Date    Genital herpes     Stable angina 07/03/2017

## 2024-03-09 NOTE — CONSULTS
[facility] - Emergency Dept  General Surgery  Consult Note    Patient Name: Danilo Zambrano  MRN: [id number]  Code Status: No Order  Admission Date: 3/9/2024  Hospital Length of Stay: 0 days  Attending Physician: Mehul Sandoval MD  Primary Care Provider: Zheng Dobbins MD (Inactive)    Patient information was obtained from patient, parent, and ER records.     Inpatient consult to General Surgery  Consult performed by: Merlin Sands MD  Consult ordered by: Manju Kellogg PA-C        Subjective:     Principal Problem: Cellulitis    History of Present Illness: 44 yo M with BMI 30 on semaglutide who presents with pain and erythema to his LLE associated with fevers and chills. He states that he had food poisoning on Thursday, and his BLEs began to cramp following this. He continued to have pain which was worse with ambulation and with his leg in a dependent position. He had some fevers following this and noticed erythema to his anterior left shin today. He presented to the ED for further evaluation. Given concern for rapidly spreading erythema, general surgery was consulted to rule out Nec Fasc.    No current facility-administered medications on file prior to encounter.     Current Outpatient Medications on File Prior to Encounter   Medication Sig    rosuvastatin (CRESTOR) 10 MG tablet Take 1 tablet (10 mg total) by mouth once daily.    semaglutide, weight loss, (WEGOVY) 2.4 mg/0.75 mL PnIj Inject 2.4 mg into the skin every 7 days.    valACYclovir (VALTREX) 500 MG tablet TAKE 1 TABLET BY MOUTH TWICE DAILY FOR OUTBREAK/RECURRENCE EPISODES       Review of patient's allergies indicates:  No Known Allergies    Past Medical History:   Diagnosis Date    Genital herpes     Stable angina 07/03/2017     Past Surgical History:   Procedure Laterality Date    cardiac cath  07/03/2017    stable angina, Dr. Dobbins    COLONOSCOPY N/A 6/16/2023    Procedure: COLONOSCOPY;  Surgeon: Isaac Reyez MD;  Location: [facility]  (4TH FLR);  Service: Endoscopy;  Laterality: N/A;  golyte prep  prep instructions sent to pt via portal -JM  timing 1-4 weeks  6/12 - Precall: no answer. SG    NASAL SEPTUM SURGERY      TONSILLECTOMY       Family History       Problem Relation (Age of Onset)    Cirrhosis Paternal Uncle    Heart disease Father, Maternal Grandfather, Paternal Grandfather, Paternal Uncle          Tobacco Use    Smoking status: Never     Passive exposure: Never    Smokeless tobacco: Never   Substance and Sexual Activity    Alcohol use: Yes     Comment: once a month max    Drug use: No    Sexual activity: Yes     Review of Systems per HPI  Objective:     Vital Signs (Most Recent):  Temp: 99.9 °F (37.7 °C) (03/09/24 1203)  Pulse: 96 (03/09/24 1303)  Resp: 18 (03/09/24 1303)  BP: 95/63 (03/09/24 1303)  SpO2: 95 % (03/09/24 1303) Vital Signs (24h Range):  Temp:  [99.9 °F (37.7 °C)-100.1 °F (37.8 °C)] 99.9 °F (37.7 °C)  Pulse:  [] 96  Resp:  [16-18] 18  SpO2:  [95 %-99 %] 95 %  BP: ()/(63-72) 95/63     Weight: 108.9 kg (240 lb)  Body mass index is 30.81 kg/m².     Physical Exam  Vitals reviewed.   Constitutional:       Appearance: Normal appearance. He is obese.      Comments: No acute distress. Appears nontoxic   HENT:      Head: Normocephalic.   Eyes:      Extraocular Movements: Extraocular movements intact.      Conjunctiva/sclera: Conjunctivae normal.   Cardiovascular:      Rate and Rhythm: Normal rate and regular rhythm.   Pulmonary:      Effort: Pulmonary effort is normal. No respiratory distress.   Abdominal:      Palpations: Abdomen is soft.      Tenderness: There is no abdominal tenderness.   Musculoskeletal:         General: No signs of injury.      Cervical back: Normal range of motion and neck supple.      Comments: No significant edema in BLEs   Skin:     Comments: LLE with warmth and erythema over the anterior lower leg. Not significantly tender. No crepitus. No blisters or abscesses.     Normal ROM of the  foot/ankle     2+ DP pulses bilaterally   Neurological:      General: No focal deficit present.      Mental Status: He is alert and oriented to person, place, and time.                  I have reviewed all pertinent lab results within the past 24 hours.  Labs reviewed  LRINEC score is 4--low suspicion for nec fasc    Significant Diagnostics:  I have reviewed all pertinent imaging results/findings within the past 24 hours.  CT LE without evidence of abscess, air, or fascial enhancement.  Assessment/Plan:     * Cellulitis  44 yo M on semaglutide for weight loss presenting with 2 days of bilateral LE cramping and 1 day of cellulitis to his anterior left lower leg. His clinical presentation, imaging, and LRINEC score are reassuring. We have a low suspicion for a necrotizing soft tissue infection.    -No surgical intervention indicated at this time  -Serial exams with monitoring borders of erythema.   -Recommend evaluation by medicine for admission for treatment of cellulitis  -Recommend continued IV Abx at this time.  -Discontinuing Semaglutide on discharge is likely indicated    Please call with clinical changes or concerns    VTE Risk Mitigation (From admission, onward)      None            Thank you for your consult. I will sign off. Please contact us if you have any additional questions.    Merlin Sands MD  General Surgery  [facility] - Emergency Dept

## 2024-03-09 NOTE — SUBJECTIVE & OBJECTIVE
Past Medical History:   Diagnosis Date    Genital herpes     Stable angina 07/03/2017       Past Surgical History:   Procedure Laterality Date    cardiac cath  07/03/2017    stable angina, Dr. Dobbins    COLONOSCOPY N/A 6/16/2023    Procedure: COLONOSCOPY;  Surgeon: Isaac Reyez MD;  Location: [address];  Service: Endoscopy;  Laterality: N/A;  golyte prep  prep instructions sent to pt via portal -JM  timing 1-4 weeks  6/12 - Precall: no answer. SG    NASAL SEPTUM SURGERY      TONSILLECTOMY         Review of patient's allergies indicates:  No Known Allergies    No current facility-administered medications on file prior to encounter.     Current Outpatient Medications on File Prior to Encounter   Medication Sig    finasteride (PROPECIA) 1 mg tablet Take 1 mg by mouth once daily.    minoxidiL (ROGAINE) 2 % external solution Apply 1 mL topically 2 (two) times daily.    rosuvastatin (CRESTOR) 10 MG tablet Take 1 tablet (10 mg total) by mouth once daily.    semaglutide, weight loss, (WEGOVY) 2.4 mg/0.75 mL PnIj Inject 2.4 mg into the skin every 7 days.    valACYclovir (VALTREX) 500 MG tablet TAKE 1 TABLET BY MOUTH TWICE DAILY FOR OUTBREAK/RECURRENCE EPISODES     Family History       Problem Relation (Age of Onset)    Cirrhosis Paternal Uncle    Heart disease Father, Maternal Grandfather, Paternal Grandfather, Paternal Uncle          Tobacco Use    Smoking status: Never     Passive exposure: Never    Smokeless tobacco: Never   Substance and Sexual Activity    Alcohol use: Yes     Comment: once a month max    Drug use: No    Sexual activity: Yes     Review of Systems   Constitutional:  Positive for chills and fever.   HENT:  Negative for trouble swallowing and voice change.    Eyes:  Negative for pain and redness.   Respiratory:  Negative for cough and shortness of breath.    Cardiovascular:  Negative for chest pain and palpitations.   Gastrointestinal:  Negative for nausea and vomiting.   Genitourinary:   Negative for difficulty urinating and dysuria.   Musculoskeletal:  Negative for neck pain and neck stiffness.   Skin:  Positive for color change. Negative for pallor.   Neurological:  Negative for seizures and syncope.     Objective:     Vital Signs (Most Recent):  Temp: 99.9 °F (37.7 °C) (03/09/24 1203)  Pulse: 96 (03/09/24 1303)  Resp: 18 (03/09/24 1303)  BP: 95/63 (03/09/24 1303)  SpO2: 95 % (03/09/24 1303) Vital Signs (24h Range):  Temp:  [99.9 °F (37.7 °C)-100.1 °F (37.8 °C)] 99.9 °F (37.7 °C)  Pulse:  [] 96  Resp:  [16-18] 18  SpO2:  [95 %-99 %] 95 %  BP: ()/(63-72) 95/63     Weight: 108.9 kg (240 lb)  Body mass index is 30.81 kg/m².     Physical Exam  Vitals and nursing note reviewed.   Constitutional:       General: He is not in acute distress.     Appearance: He is well-developed. He is obese. He is not diaphoretic.   HENT:      Head: Normocephalic and atraumatic.   Eyes:      General: No scleral icterus.     Conjunctiva/sclera: Conjunctivae normal.   Cardiovascular:      Rate and Rhythm: Regular rhythm. Tachycardia present.      Heart sounds: Normal heart sounds. No murmur heard.  Pulmonary:      Effort: Pulmonary effort is normal. No respiratory distress.      Breath sounds: No wheezing or rales.   Abdominal:      General: There is no distension.      Palpations: Abdomen is soft.      Tenderness: There is no abdominal tenderness. There is no guarding.   Musculoskeletal:         General: No deformity or signs of injury.      Right lower leg: No edema.      Left lower leg: No edema.   Skin:     General: Skin is warm and dry.      Coloration: Skin is not jaundiced or pale.      Findings: Erythema (left leg) present.   Neurological:      Mental Status: He is alert and oriented to person, place, and time. Mental status is at baseline.   Psychiatric:         Attention and Perception: Attention normal.         Mood and Affect: Mood and affect normal.         Behavior: Behavior is cooperative.                 Significant Labs: All pertinent labs within the past 24 hours have been reviewed.    Significant Imaging: I have reviewed all pertinent imaging results/findings within the past 24 hours.  CT Thigh With Contrast Left CT Leg (Tibia-Fibula) Wtih Contrast Left 3/09/24: FINDINGS:   Slightly enlarged left inguinal nodes, an index node 1.4 cm (2:249) short axis.   Subtle subcutaneous soft tissue edema proximal anterior thigh.   Subtle circumferential subcutaneous soft tissue edema of the distal left lower extremity/calf region.   There is no soft tissue abscess.  No osseous abnormality seen.   No abnormal enhancement of the musculature or intermuscular fascia region.   The remainder of the visualized osseous structures and soft tissues appear normal.   No soft tissue air, no foreign body.   Impression:  Nonspecific reactive nodes in the left inguinal region associated with mild soft tissue edema of the anterior superior left thigh.   Mild nonspecific circumferential subcutaneous soft tissue edema of the distal left lower extremity.

## 2024-03-10 PROBLEM — A41.9 SEPSIS DUE TO CELLULITIS: Status: RESOLVED | Noted: 2024-03-09 | Resolved: 2024-03-10

## 2024-03-10 PROBLEM — L03.90 SEPSIS DUE TO CELLULITIS: Status: RESOLVED | Noted: 2024-03-09 | Resolved: 2024-03-10

## 2024-03-10 LAB
ALBUMIN SERPL BCP-MCNC: 3.2 G/DL (ref 3.5–5.2)
ALP SERPL-CCNC: 67 U/L (ref 55–135)
ALT SERPL W/O P-5'-P-CCNC: 14 U/L (ref 10–44)
ANION GAP SERPL CALC-SCNC: 10 MMOL/L (ref 8–16)
AST SERPL-CCNC: 16 U/L (ref 10–40)
BILIRUB SERPL-MCNC: 1.8 MG/DL (ref 0.1–1)
BUN SERPL-MCNC: 14 MG/DL (ref 6–20)
CALCIUM SERPL-MCNC: 8.7 MG/DL (ref 8.7–10.5)
CHLORIDE SERPL-SCNC: 104 MMOL/L (ref 95–110)
CO2 SERPL-SCNC: 23 MMOL/L (ref 23–29)
CREAT SERPL-MCNC: 0.8 MG/DL (ref 0.5–1.4)
ERYTHROCYTE [DISTWIDTH] IN BLOOD BY AUTOMATED COUNT: 13.1 % (ref 11.5–14.5)
EST. GFR  (NO RACE VARIABLE): >60 ML/MIN/1.73 M^2
GLUCOSE SERPL-MCNC: 90 MG/DL (ref 70–110)
HCT VFR BLD AUTO: 37.5 % (ref 40–54)
HGB BLD-MCNC: 12.9 G/DL (ref 14–18)
MCH RBC QN AUTO: 30.1 PG (ref 27–31)
MCHC RBC AUTO-ENTMCNC: 34.4 G/DL (ref 32–36)
MCV RBC AUTO: 88 FL (ref 82–98)
PLATELET # BLD AUTO: 127 K/UL (ref 150–450)
PMV BLD AUTO: 10.1 FL (ref 9.2–12.9)
POTASSIUM SERPL-SCNC: 3.4 MMOL/L (ref 3.5–5.1)
PROT SERPL-MCNC: 6.3 G/DL (ref 6–8.4)
RBC # BLD AUTO: 4.28 M/UL (ref 4.6–6.2)
SODIUM SERPL-SCNC: 137 MMOL/L (ref 136–145)
WBC # BLD AUTO: 10.71 K/UL (ref 3.9–12.7)

## 2024-03-10 PROCEDURE — 63600175 PHARM REV CODE 636 W HCPCS: Performed by: HOSPITALIST

## 2024-03-10 PROCEDURE — 96367 TX/PROPH/DG ADDL SEQ IV INF: CPT

## 2024-03-10 PROCEDURE — 96376 TX/PRO/DX INJ SAME DRUG ADON: CPT

## 2024-03-10 PROCEDURE — G0378 HOSPITAL OBSERVATION PER HR: HCPCS

## 2024-03-10 PROCEDURE — 85027 COMPLETE CBC AUTOMATED: CPT | Performed by: HOSPITALIST

## 2024-03-10 PROCEDURE — 80053 COMPREHEN METABOLIC PANEL: CPT | Performed by: HOSPITALIST

## 2024-03-10 PROCEDURE — 25000003 PHARM REV CODE 250: Performed by: HOSPITALIST

## 2024-03-10 PROCEDURE — 36415 COLL VENOUS BLD VENIPUNCTURE: CPT | Performed by: HOSPITALIST

## 2024-03-10 RX ADMIN — VANCOMYCIN HYDROCHLORIDE 1500 MG: 1.5 INJECTION, POWDER, LYOPHILIZED, FOR SOLUTION INTRAVENOUS at 05:03

## 2024-03-10 RX ADMIN — MUPIROCIN: 20 OINTMENT TOPICAL at 10:03

## 2024-03-10 RX ADMIN — ACETAMINOPHEN 650 MG: 325 TABLET ORAL at 04:03

## 2024-03-10 RX ADMIN — MUPIROCIN: 20 OINTMENT TOPICAL at 09:03

## 2024-03-10 RX ADMIN — KETOROLAC TROMETHAMINE 10 MG: 10 TABLET, FILM COATED ORAL at 09:03

## 2024-03-10 RX ADMIN — DEXTROSE MONOHYDRATE 1 G: 2.5 INJECTION INTRAVENOUS at 04:03

## 2024-03-10 RX ADMIN — DIPHENHYDRAMINE HYDROCHLORIDE 25 MG: 25 CAPSULE ORAL at 10:03

## 2024-03-10 RX ADMIN — KETOROLAC TROMETHAMINE 10 MG: 10 TABLET, FILM COATED ORAL at 10:03

## 2024-03-10 NOTE — ASSESSMENT & PLAN NOTE
Sepsis resolved. Giving vancomycin and topical mupirocin. Elevate leg. Monitor appearance. Add cefazolin. Giving acetaminophen, ibuprofen, and ketorloac prn for pain.

## 2024-03-10 NOTE — SUBJECTIVE & OBJECTIVE
Interval History: Erythema spread to posterior leg.    Review of Systems   Respiratory:  Negative for cough and shortness of breath.    Gastrointestinal:  Negative for abdominal pain and vomiting.   Skin:  Positive for color change. Negative for pallor.   Neurological:  Positive for headaches. Negative for seizures and syncope.     Objective:     Vital Signs (Most Recent):  Temp: 98.7 °F (37.1 °C) (03/10/24 1209)  Pulse: 80 (03/10/24 1209)  Resp: 16 (03/10/24 1209)  BP: 107/75 (03/10/24 1209)  SpO2: 96 % (03/10/24 1209) Vital Signs (24h Range):  Temp:  [98.6 °F (37 °C)-100.6 °F (38.1 °C)] 98.7 °F (37.1 °C)  Pulse:  [79-97] 80  Resp:  [16-18] 16  SpO2:  [94 %-100 %] 96 %  BP: ()/(52-76) 107/75     Weight: 108.9 kg (240 lb)  Body mass index is 30.81 kg/m².    Intake/Output Summary (Last 24 hours) at 3/10/2024 1601  Last data filed at 3/9/2024 1617  Gross per 24 hour   Intake 999 ml   Output --   Net 999 ml         Physical Exam  Vitals and nursing note reviewed.   Constitutional:       General: He is not in acute distress.     Appearance: He is well-developed. He is obese. He is not diaphoretic.      Interventions: He is not intubated.  Pulmonary:      Effort: Pulmonary effort is normal. No accessory muscle usage or respiratory distress. He is not intubated.   Skin:     General: Skin is dry.      Coloration: Skin is not jaundiced or pale.      Findings: Erythema (left leg) present.   Neurological:      Mental Status: He is alert and oriented to person, place, and time. Mental status is at baseline.      Motor: No seizure activity.   Psychiatric:         Attention and Perception: Attention normal.         Mood and Affect: Affect normal.         Behavior: Behavior is not agitated. Behavior is cooperative.             Significant Labs: All pertinent labs within the past 24 hours have been reviewed.    Significant Imaging: I have reviewed all pertinent imaging results/findings within the past 24 hours.

## 2024-03-10 NOTE — HOSPITAL COURSE
He was put on vancomycin and topical mupirocin. He was given a dose of oxycodone by the night shift, but it did not help much. Acetaminophen and ketorolac helped the pain more. Leukocytosis and tachycardia resolved. Erythema spread to the posterior leg. Cefazolin was added. It was changed to ceftriaxone on 3/11/2024. Symptoms dramatically improved on 3/12/2024. Total bilirubin decreased to normal. He was prescribed clindamycin and discharged home.

## 2024-03-10 NOTE — PLAN OF CARE
Sw was unable to complete assessment, pt was asleep. Will attempt to complete assessment at a later time.         Richie Parker LMSW  Case Management  Emergency Department  [phone]

## 2024-03-10 NOTE — PLAN OF CARE
Pt would not arouse to Sw attempting to complete dc planning assessment. Sw placed call to spouse Cira Jackson at  and left VM to have her return Sw's call to complete dc needs assessment, will follow.

## 2024-03-10 NOTE — PROGRESS NOTES
[facility] Medicine  Progress Note    Patient Name: Danilo Zambrano  MRN: [id number]  Patient Class: OP- Observation   Admission Date: 3/9/2024  Length of Stay: 0 days  Attending Physician: Mehul Sandoval MD  Primary Care Provider: Zheng Dobbins MD (Inactive)        Subjective:     Principal Problem:Left leg cellulitis        HPI:  Danilo Zambrano is a 43 year old white man with obesity, fatty liver. He lives in [place], [place]. He is .    He presented to [facility] Emergency Department on Saturday 3/9/2024 with left leg erythema and pain associated with fevers and chills. He had been walking with sandals outside through grass and on sidewalks, and it was raining often, so he may have walked through puddles as well. He had food poisoning on Thursday causing muscle cramps in his thighs. He was feeling very ill so did not wash whatever he picked up outside off his legs. The pain worsened and he noticed the erythema on his left leg on the day of presentation. The pain was severe whenever he put weight or walked on the leg. In the emergency department, his temperature was 100.1° Fahrenheit, his pulse was 100 bpm, and his WBC was elevated [place] [address]/uL, CRP was elevated at 293.7 mg/L, and total bilrubin was elevated at 3.1 mg/dL. He was given vancomycin, clindamycin, and doxycycline. Imaging showed cellulitis with no abscess. He was seen by General Surgery. He was admitted to Hospital Medicine Team W.    Overview/Hospital Course:  He was put on vancomycin and topical mupirocin. He was given a dose of oxycodone by the night shift, but it did not help much. Acetaminophen and ketorolac helped the pain more. Leukocytosis and tachycardia resolved.     Interval History: Erythema spread to posterior leg.    Review of Systems   Respiratory:  Negative for cough and shortness of breath.    Gastrointestinal:  Negative for abdominal pain and vomiting.   Skin:  Positive  for color change. Negative for pallor.   Neurological:  Positive for headaches. Negative for seizures and syncope.     Objective:     Vital Signs (Most Recent):  Temp: 98.7 °F (37.1 °C) (03/10/24 1209)  Pulse: 80 (03/10/24 1209)  Resp: 16 (03/10/24 1209)  BP: 107/75 (03/10/24 1209)  SpO2: 96 % (03/10/24 1209) Vital Signs (24h Range):  Temp:  [98.6 °F (37 °C)-100.6 °F (38.1 °C)] 98.7 °F (37.1 °C)  Pulse:  [79-97] 80  Resp:  [16-18] 16  SpO2:  [94 %-100 %] 96 %  BP: ()/(52-76) 107/75     Weight: 108.9 kg (240 lb)  Body mass index is 30.81 kg/m².    Intake/Output Summary (Last 24 hours) at 3/10/2024 1601  Last data filed at 3/9/2024 1617  Gross per 24 hour   Intake 999 ml   Output --   Net 999 ml         Physical Exam  Vitals and nursing note reviewed.   Constitutional:       General: He is not in acute distress.     Appearance: He is well-developed. He is obese. He is not diaphoretic.      Interventions: He is not intubated.  Pulmonary:      Effort: Pulmonary effort is normal. No accessory muscle usage or respiratory distress. He is not intubated.   Skin:     General: Skin is dry.      Coloration: Skin is not jaundiced or pale.      Findings: Erythema (left leg) present.   Neurological:      Mental Status: He is alert and oriented to person, place, and time. Mental status is at baseline.      Motor: No seizure activity.   Psychiatric:         Attention and Perception: Attention normal.         Mood and Affect: Affect normal.         Behavior: Behavior is not agitated. Behavior is cooperative.             Significant Labs: All pertinent labs within the past 24 hours have been reviewed.    Significant Imaging: I have reviewed all pertinent imaging results/findings within the past 24 hours.    Assessment/Plan:      * Left leg cellulitis  Sepsis resolved. Giving vancomycin and topical mupirocin. Elevate leg. Monitor appearance. Add cefazolin. Giving acetaminophen, ibuprofen, and ketorloac prn for pain.     Elevated  bilirubin  Improved since admission. Could be related to semaglutide.      Obesity (BMI 30-39.9)  He takes semagutide and is followed in [facility].      VTE Risk Mitigation (From admission, onward)      None            Discharge Planning   KELVIN:      Code Status: Full Code   Is the patient medically ready for discharge?: No    Reason for patient still in hospital (select all that apply): Patient trending condition and Treatment                     Mehul Sandoval MD  Department of Hospital Medicine   [facility] Surg

## 2024-03-11 LAB
CREAT SERPL-MCNC: 0.8 MG/DL (ref 0.5–1.4)
EST. GFR  (NO RACE VARIABLE): >60 ML/MIN/1.73 M^2
OHS QRS DURATION: 102 MS
OHS QTC CALCULATION: 419 MS
VANCOMYCIN TROUGH SERPL-MCNC: 11.1 UG/ML (ref 10–22)
VANCOMYCIN TROUGH SERPL-MCNC: 32.2 UG/ML (ref 10–22)

## 2024-03-11 PROCEDURE — 36415 COLL VENOUS BLD VENIPUNCTURE: CPT | Performed by: HOSPITALIST

## 2024-03-11 PROCEDURE — 25000003 PHARM REV CODE 250: Performed by: HOSPITALIST

## 2024-03-11 PROCEDURE — 82565 ASSAY OF CREATININE: CPT | Performed by: HOSPITALIST

## 2024-03-11 PROCEDURE — 96375 TX/PRO/DX INJ NEW DRUG ADDON: CPT

## 2024-03-11 PROCEDURE — 80202 ASSAY OF VANCOMYCIN: CPT | Mod: 91 | Performed by: HOSPITALIST

## 2024-03-11 PROCEDURE — G0378 HOSPITAL OBSERVATION PER HR: HCPCS

## 2024-03-11 PROCEDURE — 63600175 PHARM REV CODE 636 W HCPCS: Performed by: HOSPITALIST

## 2024-03-11 PROCEDURE — 96376 TX/PRO/DX INJ SAME DRUG ADON: CPT

## 2024-03-11 RX ADMIN — VANCOMYCIN HYDROCHLORIDE 1500 MG: 1.5 INJECTION, POWDER, LYOPHILIZED, FOR SOLUTION INTRAVENOUS at 04:03

## 2024-03-11 RX ADMIN — MUPIROCIN: 20 OINTMENT TOPICAL at 08:03

## 2024-03-11 RX ADMIN — DEXTROSE MONOHYDRATE 1 G: 2.5 INJECTION INTRAVENOUS at 09:03

## 2024-03-11 RX ADMIN — VANCOMYCIN HYDROCHLORIDE 1500 MG: 1.5 INJECTION, POWDER, LYOPHILIZED, FOR SOLUTION INTRAVENOUS at 06:03

## 2024-03-11 RX ADMIN — ACETAMINOPHEN 650 MG: 325 TABLET ORAL at 09:03

## 2024-03-11 RX ADMIN — DIPHENHYDRAMINE HYDROCHLORIDE 25 MG: 25 CAPSULE ORAL at 12:03

## 2024-03-11 RX ADMIN — MUPIROCIN: 20 OINTMENT TOPICAL at 09:03

## 2024-03-11 RX ADMIN — CEFTRIAXONE 2 G: 2 INJECTION, POWDER, FOR SOLUTION INTRAMUSCULAR; INTRAVENOUS at 04:03

## 2024-03-11 RX ADMIN — DIPHENHYDRAMINE HYDROCHLORIDE 25 MG: 25 CAPSULE ORAL at 08:03

## 2024-03-11 RX ADMIN — DEXTROSE MONOHYDRATE 1 G: 2.5 INJECTION INTRAVENOUS at 01:03

## 2024-03-11 RX ADMIN — KETOROLAC TROMETHAMINE 10 MG: 10 TABLET, FILM COATED ORAL at 08:03

## 2024-03-11 NOTE — PROGRESS NOTES
Pharmacokinetic Assessment Follow Up: IV Vancomycin    Vancomycin serum concentration assessment(s):    The trough level was drawn incorrectly and cannot be used to guide therapy at this time.    Vancomycin Regimen Plan:    The level was drawn this morning at 0519 however the vancomycin dose was administered at 0456 therefore, the level does not represent a trough  Will plan to follow up with a vancomycin trough this afternoon at 1600  Would strongly recommend daily or q48h BMP's for renal function monitoring while on vancomycin    Drug levels (last 3 results):  Recent Labs   Lab Result Units 03/11/24  0519   Vancomycin-Trough ug/mL 32.2*     Pharmacy will continue to follow and monitor vancomycin.    Please contact pharmacy at extension [phone] for questions regarding this assessment.    Thank you for the consult,   Kurt Lao, PharmD, [facility]      Patient brief summary:  Danilo Zambrano is a 43 y.o. male initiated on antimicrobial therapy with IV Vancomycin for treatment of skin & soft tissue infection    Drug Allergies:   Review of patient's allergies indicates:  No Known Allergies    Actual Body Weight:   108.9 kg    Renal Function:   Estimated Creatinine Clearance: 159.7 mL/min (based on SCr of 0.8 mg/dL).,     Dialysis Method (if applicable):  N/A    CBC (last 72 hours):  Recent Labs   Lab Result Units 03/09/24  1112 03/10/24  0541   WBC K/uL 15.04* 10.71   Hemoglobin g/dL 16.0 12.9*   Hematocrit % 45.8 37.5*   Platelets K/uL 162 127*   Gran % % 80.1*  --    Lymph % % 14.2*  --    Mono % % 4.9  --    Eosinophil % % 0.0  --    Basophil % % 0.3  --    Differential Method  Automated  --        Metabolic Panel (last 72 hours):  Recent Labs   Lab Result Units 03/09/24  1112 03/10/24  0542   Sodium mmol/L 135* 137   Potassium mmol/L 3.8 3.4*   Chloride mmol/L 99 104   CO2 mmol/L 22* 23   Glucose mg/dL 96 90   BUN mg/dL 21* 14   Creatinine mg/dL 1.2 0.8   Albumin g/dL 4.4 3.2*   Total Bilirubin mg/dL 3.1* 1.8*    Alkaline Phosphatase U/L 91 67   AST U/L 19 16   ALT U/L 17 14   Magnesium mg/dL 1.7  --        Vancomycin Administrations:  vancomycin given in the last 96 hours                     vancomycin 1,500 mg in dextrose 5 % (D5W) 250 mL IVPB (Vial-Mate) (mg) 1,500 mg New Bag 03/11/24 0456     1,500 mg New Bag 03/10/24 1726     1,500 mg New Bag  0516    vancomycin 2 g in dextrose 5 % 500 mL IVPB (mg) 2,000 mg New Bag 03/09/24 1217                    Microbiologic Results:  Microbiology Results (last 7 days)       Procedure Component Value Units Date/Time    Blood culture #2 **CANNOT BE ORDERED STAT** [[id number]] Collected: 03/09/24 1055    Order Status: Completed Specimen: Blood from Peripheral, Antecubital, Left Updated: 03/10/24 1412     Blood Culture, Routine No Growth to date      No Growth to date    Blood culture #1 **CANNOT BE ORDERED STAT** [[id number]] Collected: 03/09/24 1005    Order Status: Completed Specimen: Blood from Peripheral, Forearm, Right Updated: 03/10/24 1212     Blood Culture, Routine No Growth to date      No Growth to date

## 2024-03-11 NOTE — PLAN OF CARE
[facility] - Med Surg  Initial Discharge Assessment       Primary Care Provider: Zheng Dobbins MD (Inactive)    Admission Diagnosis: Cellulitis [L03.90]  Muscle spasms of lower extremity [M62.838]    Admission Date: 3/9/2024  Expected Discharge Date: 3/12/2024    Transition of Care Barriers: (P) None    Payor: [employer]ET[employer] / Plan: [employer]ET[employer] MANAGED CHOICE POS / Product Type: PPO /     Extended Emergency Contact Information  Primary Emergency Contact: Cira Jackson  Address: [address] [place] [address]           [place] [address] [facility]  Home Phone: [phone]  Relation: Spouse    Discharge Plan A: (P) Home with family  Discharge Plan B: (P) Home      [id number] - [place] - [address]  AT [facility] [address]  [address] [place] [address]  Phone: [phone] Fax: [phone]      CM met with patient to discuss discharge planning. Patient lives home with Cira Jackson (spouse) [phone] in a single story home with no steps to enter. Prior to hospital admission, patient was independent with ADLs and did not require equipment. Will continue to follow for post acute needs. Discharge Plan A and Plan B have been determined by review of patient's clinical status, future medical and therapeutic needs, and coverage/benefits for post-acute care in coordination with multidisciplinary team members.  Initial Assessment (most recent)       Adult Discharge Assessment - 03/11/24 1417          Discharge Assessment    Assessment Type Discharge Planning Assessment (P)      Confirmed/corrected address, phone number and insurance Yes (P)      Confirmed Demographics Correct on Facesheet (P)      Source of Information patient (P)      Does patient/caregiver understand observation status Yes (P)      Communicated KELVIN with patient/caregiver Date not available/Unable to determine (P)      Reason For Admission Left leg Cellulitis (P)      People in Home spouse (P)      Facility Arrived From: Home (P)       Do you expect to return to your current living situation? Yes (P)      Do you have help at home or someone to help you manage your care at home? Yes (P)      Who are your caregiver(s) and their phone number(s)? Cira Jackson (spouse) [phone] (P)      Prior to hospitilization cognitive status: Alert/Oriented (P)      Current cognitive status: Alert/Oriented (P)      Walking or Climbing Stairs Difficulty no (P)      Dressing/Bathing Difficulty no (P)      Home Layout Able to live on 1st floor (P)      Equipment Currently Used at Home none (P)      Readmission within 30 days? No (P)      Patient currently being followed by outpatient case management? No (P)      Do you currently have service(s) that help you manage your care at home? No (P)      Do you take prescription medications? Yes (P)      Do you have prescription coverage? Yes (P)      Coverage [employer] Managed Choice (P)      Do you have any problems affording any of your prescribed medications? No (P)      Is the patient taking medications as prescribed? yes (P)      How do you get to doctors appointments? car, drives self (P)      Are you on dialysis? No (P)      Do you take coumadin? No (P)      Discharge Plan A Home with family (P)      Discharge Plan B Home (P)      DME Needed Upon Discharge  none (P)      Discharge Plan discussed with: Patient (P)      Transition of Care Barriers None (P)         Physical Activity    On average, how many days per week do you engage in moderate to strenuous exercise (like a brisk walk)? 1 day (P)      On average, how many minutes do you engage in exercise at this level? 30 min (P)         Financial Resource Strain    How hard is it for you to pay for the very basics like food, housing, medical care, and heating? Not hard at all (P)         Housing Stability    In the last 12 months, was there a time when you were not able to pay the mortgage or rent on time? No (P)      In the last 12 months, how many places have you  lived? 1 (P)      In the last 12 months, was there a time when you did not have a steady place to sleep or slept in a shelter (including now)? No (P)         Transportation Needs    In the past 12 months, has lack of transportation kept you from medical appointments or from getting medications? No (P)      In the past 12 months, has lack of transportation kept you from meetings, work, or from getting things needed for daily living? No (P)         Food Insecurity    Within the past 12 months, you worried that your food would run out before you got the money to buy more. Never true (P)      Within the past 12 months, the food you bought just didn't last and you didn't have money to get more. Never true (P)         Stress    Do you feel stress - tense, restless, nervous, or anxious, or unable to sleep at night because your mind is troubled all the time - these days? To some extent (P)         Social Connections    In a typical week, how many times do you talk on the phone with family, friends, or neighbors? More than three times a week (P)      How often do you get together with friends or relatives? More than three times a week (P)      How often do you attend Jainism or Advent services? More than 4 times per year (P)      Do you belong to any clubs or organizations such as Jainism groups, unions, fraternal or athletic groups, or school groups? No (P)      How often do you attend meetings of the clubs or organizations you belong to? Never (P)      Are you , , , , never , or living with a partner?  (P)         Alcohol Use    Q1: How often do you have a drink containing alcohol? Monthly or less (P)      Q2: How many drinks containing alcohol do you have on a typical day when you are drinking? 1 or 2 (P)      Q3: How often do you have six or more drinks on one occasion? Never (P)         OTHER    Name(s) of People in Home Cira Pascuale (spouse) [phone] (P)                       Paul Hills, RNCM  Case Management  [phone]

## 2024-03-11 NOTE — PROGRESS NOTES
Pharmacokinetic Assessment Follow Up: IV Vancomycin    Vancomycin serum concentration assessment(s):    The trough level was drawn correctly and can be used to guide therapy at this time. The measurement is within the desired definitive target range of 10 to 15 mcg/mL.    Vancomycin Regimen Plan:    Continue regimen to Vancomycin 1500 mg IV every 12 hours with next serum trough concentration measured at 1700 prior to 9th dose on 3/13/24    Drug levels (last 3 results):  Recent Labs   Lab Result Units 03/11/24  0519 03/11/24  1611   Vancomycin-Trough ug/mL 32.2* 11.1       Pharmacy will continue to follow and monitor vancomycin.    Please contact pharmacy for questions regarding this assessment.    Thank you for the consult,   Katherine E Mcardle       Patient brief summary:  Danilo Zambrano is a 43 y.o. male initiated on antimicrobial therapy with IV Vancomycin for treatment of skin & soft tissue infection    The patient's current regimen is 1500mg q12h    Drug Allergies:   Review of patient's allergies indicates:  No Known Allergies    Actual Body Weight:   108.9kg    Renal Function:   Estimated Creatinine Clearance: 159.7 mL/min (based on SCr of 0.8 mg/dL).,     Dialysis Method (if applicable):  N/A    CBC (last 72 hours):  Recent Labs   Lab Result Units 03/09/24  1112 03/10/24  0541   WBC K/uL 15.04* 10.71   Hemoglobin g/dL 16.0 12.9*   Hematocrit % 45.8 37.5*   Platelets K/uL 162 127*   Gran % % 80.1*  --    Lymph % % 14.2*  --    Mono % % 4.9  --    Eosinophil % % 0.0  --    Basophil % % 0.3  --    Differential Method  Automated  --        Metabolic Panel (last 72 hours):  Recent Labs   Lab Result Units 03/09/24  1112 03/10/24  0542 03/11/24  1611   Sodium mmol/L 135* 137  --    Potassium mmol/L 3.8 3.4*  --    Chloride mmol/L 99 104  --    CO2 mmol/L 22* 23  --    Glucose mg/dL 96 90  --    BUN mg/dL 21* 14  --    Creatinine mg/dL 1.2 0.8 0.8   Albumin g/dL 4.4 3.2*  --    Total Bilirubin mg/dL 3.1*  1.8*  --    Alkaline Phosphatase U/L 91 67  --    AST U/L 19 16  --    ALT U/L 17 14  --    Magnesium mg/dL 1.7  --   --        Vancomycin Administrations:  vancomycin given in the last 96 hours                     vancomycin 1,500 mg in dextrose 5 % (D5W) 250 mL IVPB (Vial-Mate) (mg) 1,500 mg New Bag 03/11/24 1811     1,500 mg New Bag  0456     1,500 mg New Bag 03/10/24 1726     1,500 mg New Bag  0516    vancomycin 2 g in dextrose 5 % 500 mL IVPB (mg) 2,000 mg New Bag 03/09/24 1217                    Microbiologic Results:  Microbiology Results (last 7 days)       Procedure Component Value Units Date/Time    Blood culture #2 **CANNOT BE ORDERED STAT** [[id number]] Collected: 03/09/24 1055    Order Status: Completed Specimen: Blood from Peripheral, Antecubital, Left Updated: 03/11/24 1412     Blood Culture, Routine No Growth to date      No Growth to date      No Growth to date    Blood culture #1 **CANNOT BE ORDERED STAT** [[id number]] Collected: 03/09/24 1005    Order Status: Completed Specimen: Blood from Peripheral, Forearm, Right Updated: 03/11/24 1212     Blood Culture, Routine No Growth to date      No Growth to date      No Growth to date

## 2024-03-11 NOTE — SUBJECTIVE & OBJECTIVE
Interval History: Erythema about the same. Still hurts to hang leg down or try to walk on it.    Review of Systems   Respiratory:  Negative for cough and shortness of breath.    Gastrointestinal:  Negative for abdominal pain and vomiting.   Skin:  Positive for color change. Negative for pallor.   Neurological:  Positive for headaches. Negative for seizures and syncope.     Objective:     Vital Signs (Most Recent):  Temp: 98 °F (36.7 °C) (03/11/24 1140)  Pulse: 70 (03/11/24 1140)  Resp: 16 (03/11/24 1140)  BP: 108/74 (03/11/24 1140)  SpO2: 97 % (03/11/24 1140) Vital Signs (24h Range):  Temp:  [98 °F (36.7 °C)-98.5 °F (36.9 °C)] 98 °F (36.7 °C)  Pulse:  [70-82] 70  Resp:  [16-18] 16  SpO2:  [94 %-98 %] 97 %  BP: ()/(58-75) 108/74     Weight: 108.9 kg (240 lb)  Body mass index is 30.81 kg/m².    Intake/Output Summary (Last 24 hours) at 3/11/2024 1513  Last data filed at 3/11/2024 1237  Gross per 24 hour   Intake --   Output 300 ml   Net -300 ml           Physical Exam  Vitals and nursing note reviewed.   Constitutional:       General: He is not in acute distress.     Appearance: He is well-developed. He is obese. He is not diaphoretic.      Interventions: He is not intubated.  Pulmonary:      Effort: Pulmonary effort is normal. No accessory muscle usage or respiratory distress. He is not intubated.   Skin:     General: Skin is dry.      Coloration: Skin is not jaundiced or pale.      Findings: Erythema (left leg) present.   Neurological:      Mental Status: He is alert and oriented to person, place, and time. Mental status is at baseline.      Motor: No seizure activity.   Psychiatric:         Attention and Perception: Attention normal.         Mood and Affect: Affect normal.         Behavior: Behavior is not agitated. Behavior is cooperative.             Significant Labs: All pertinent labs within the past 24 hours have been reviewed.    Significant Imaging: I have reviewed all pertinent imaging results/findings  within the past 24 hours.

## 2024-03-11 NOTE — PROGRESS NOTES
[facility] Medicine  Progress Note    Patient Name: Danilo Zambrano  MRN: [id number]  Patient Class: OP- Observation   Admission Date: 3/9/2024  Length of Stay: 0 days  Attending Physician: Mehul Sandoval MD  Primary Care Provider: Zheng Dobbins MD (Inactive)        Subjective:     Principal Problem:Left leg cellulitis        HPI:  Danilo Zambrano is a 43 year old white man with obesity, fatty liver. He lives in [place], [place]. He is .    He presented to [facility] on Saturday 3/9/2024 with left leg erythema and pain associated with fevers and chills. He had been walking with sandals outside through grass and on sidewalks, and it was raining often, so he may have walked through puddles as well. He had food poisoning on Thursday causing muscle cramps in his thighs. He was feeling very ill so did not wash whatever he picked up outside off his legs. The pain worsened and he noticed the erythema on his left leg on the day of presentation. The pain was severe whenever he put weight or walked on the leg. In the emergency department, his temperature was 100.1° Fahrenheit, his pulse was 100 bpm, and his WBC was elevated [place] [address]/uL, CRP was elevated at 293.7 mg/L, and total bilrubin was elevated at 3.1 mg/dL. He was given vancomycin, clindamycin, and doxycycline. Imaging showed cellulitis with no abscess. He was seen by General Surgery. He was admitted to Hospital Medicine Team W.    Overview/Hospital Course:  He was put on vancomycin and topical mupirocin. He was given a dose of oxycodone by the night shift, but it did not help much. Acetaminophen and ketorolac helped the pain more. Leukocytosis and tachycardia resolved. Erythema spread to the posterior leg. Cefazolin was added. It was changed to ceftriaxone on 3/11/2024.     Interval History: Erythema about the same. Still hurts to hang leg down or try to walk on it.    Review of  Systems   Respiratory:  Negative for cough and shortness of breath.    Gastrointestinal:  Negative for abdominal pain and vomiting.   Skin:  Positive for color change. Negative for pallor.   Neurological:  Positive for headaches. Negative for seizures and syncope.     Objective:     Vital Signs (Most Recent):  Temp: 98 °F (36.7 °C) (03/11/24 1140)  Pulse: 70 (03/11/24 1140)  Resp: 16 (03/11/24 1140)  BP: 108/74 (03/11/24 1140)  SpO2: 97 % (03/11/24 1140) Vital Signs (24h Range):  Temp:  [98 °F (36.7 °C)-98.5 °F (36.9 °C)] 98 °F (36.7 °C)  Pulse:  [70-82] 70  Resp:  [16-18] 16  SpO2:  [94 %-98 %] 97 %  BP: ()/(58-75) 108/74     Weight: 108.9 kg (240 lb)  Body mass index is 30.81 kg/m².    Intake/Output Summary (Last 24 hours) at 3/11/2024 1513  Last data filed at 3/11/2024 1237  Gross per 24 hour   Intake --   Output 300 ml   Net -300 ml           Physical Exam  Vitals and nursing note reviewed.   Constitutional:       General: He is not in acute distress.     Appearance: He is well-developed. He is obese. He is not diaphoretic.      Interventions: He is not intubated.  Pulmonary:      Effort: Pulmonary effort is normal. No accessory muscle usage or respiratory distress. He is not intubated.   Skin:     General: Skin is dry.      Coloration: Skin is not jaundiced or pale.      Findings: Erythema (left leg) present.   Neurological:      Mental Status: He is alert and oriented to person, place, and time. Mental status is at baseline.      Motor: No seizure activity.   Psychiatric:         Attention and Perception: Attention normal.         Mood and Affect: Affect normal.         Behavior: Behavior is not agitated. Behavior is cooperative.             Significant Labs: All pertinent labs within the past 24 hours have been reviewed.    Significant Imaging: I have reviewed all pertinent imaging results/findings within the past 24 hours.    Assessment/Plan:      * Left leg cellulitis  Sepsis resolved but pain and  erythema have not improved. Giving vancomycin, cephalexin, and topical mupirocin. Change cephalexin to ceftriaxone. Elevate leg. Monitor appearance. Giving acetaminophen, ibuprofen, and ketorloac prn for pain.     Elevated bilirubin  Improved since admission. Could be related to semaglutide.      Obesity (BMI 30-39.9)  He takes semagutide and is followed in [facility].      VTE Risk Mitigation (From admission, onward)      None            Discharge Planning   KELVIN: 3/12/2024     Code Status: Full Code   Is the patient medically ready for discharge?: No    Reason for patient still in hospital (select all that apply): Patient trending condition and Treatment  Discharge Plan A: Home with family                  Mehul Sandoval MD  Department of Hospital Medicine   [facility] Surg

## 2024-03-11 NOTE — PLAN OF CARE
Problem: Adult Inpatient Plan of Care  Goal: Plan of Care Review  Outcome: Ongoing, Progressing  Flowsheets (Taken 3/11/2024 0433)  Plan of Care Reviewed With:   patient   parent  Goal: Patient-Specific Goal (Individualized)  Outcome: Ongoing, Progressing  Goal: Absence of Hospital-Acquired Illness or Injury  Outcome: Ongoing, Progressing  Intervention: Identify and Manage Fall Risk  Flowsheets (Taken 3/11/2024 0433)  Safety Promotion/Fall Prevention:   assistive device/personal item within reach   lighting adjusted   medications reviewed  Goal: Optimal Comfort and Wellbeing  Outcome: Ongoing, Progressing  Intervention: Monitor Pain and Promote Comfort  Flowsheets (Taken 3/11/2024 0433)  Pain Management Interventions: pain management plan reviewed with patient/caregiver  Goal: Readiness for Transition of Care  Outcome: Ongoing, Progressing     Problem: Adjustment to Illness (Sepsis/Septic Shock)  Goal: Optimal Coping  Outcome: Ongoing, Progressing     Problem: Bleeding (Sepsis/Septic Shock)  Goal: Absence of Bleeding  Outcome: Ongoing, Progressing     Problem: Glycemic Control Impaired (Sepsis/Septic Shock)  Goal: Blood Glucose Level Within Desired Range  Outcome: Ongoing, Progressing     Problem: Infection Progression (Sepsis/Septic Shock)  Goal: Absence of Infection Signs and Symptoms  Outcome: Ongoing, Progressing     Problem: Nutrition Impaired (Sepsis/Septic Shock)  Goal: Optimal Nutrition Intake  Outcome: Ongoing, Progressing

## 2024-03-11 NOTE — ASSESSMENT & PLAN NOTE
Sepsis resolved but pain and erythema have not improved. Giving vancomycin, cephalexin, and topical mupirocin. Change cephalexin to ceftriaxone. Elevate leg. Monitor appearance. Giving acetaminophen, ibuprofen, and ketorloac prn for pain.

## 2024-03-12 VITALS
HEART RATE: 81 BPM | DIASTOLIC BLOOD PRESSURE: 75 MMHG | HEIGHT: 74 IN | TEMPERATURE: 98 F | SYSTOLIC BLOOD PRESSURE: 133 MMHG | BODY MASS INDEX: 30.8 KG/M2 | OXYGEN SATURATION: 96 % | WEIGHT: 240 LBS | RESPIRATION RATE: 16 BRPM

## 2024-03-12 PROBLEM — R17 ELEVATED BILIRUBIN: Status: RESOLVED | Noted: 2023-08-28 | Resolved: 2024-03-12

## 2024-03-12 LAB
ALBUMIN SERPL BCP-MCNC: 3.2 G/DL (ref 3.5–5.2)
ALP SERPL-CCNC: 59 U/L (ref 55–135)
ALT SERPL W/O P-5'-P-CCNC: 14 U/L (ref 10–44)
ANION GAP SERPL CALC-SCNC: 5 MMOL/L (ref 8–16)
AST SERPL-CCNC: 16 U/L (ref 10–40)
BILIRUB SERPL-MCNC: 0.9 MG/DL (ref 0.1–1)
BUN SERPL-MCNC: 15 MG/DL (ref 6–20)
CALCIUM SERPL-MCNC: 9.2 MG/DL (ref 8.7–10.5)
CHLORIDE SERPL-SCNC: 108 MMOL/L (ref 95–110)
CO2 SERPL-SCNC: 27 MMOL/L (ref 23–29)
CREAT SERPL-MCNC: 0.9 MG/DL (ref 0.5–1.4)
ERYTHROCYTE [DISTWIDTH] IN BLOOD BY AUTOMATED COUNT: 13.1 % (ref 11.5–14.5)
EST. GFR  (NO RACE VARIABLE): >60 ML/MIN/1.73 M^2
GLUCOSE SERPL-MCNC: 90 MG/DL (ref 70–110)
HCT VFR BLD AUTO: 39.1 % (ref 40–54)
HGB BLD-MCNC: 13.1 G/DL (ref 14–18)
MCH RBC QN AUTO: 29.2 PG (ref 27–31)
MCHC RBC AUTO-ENTMCNC: 33.5 G/DL (ref 32–36)
MCV RBC AUTO: 87 FL (ref 82–98)
PLATELET # BLD AUTO: 179 K/UL (ref 150–450)
PMV BLD AUTO: 10.2 FL (ref 9.2–12.9)
POTASSIUM SERPL-SCNC: 4.4 MMOL/L (ref 3.5–5.1)
PROT SERPL-MCNC: 6.6 G/DL (ref 6–8.4)
RBC # BLD AUTO: 4.49 M/UL (ref 4.6–6.2)
SODIUM SERPL-SCNC: 140 MMOL/L (ref 136–145)
WBC # BLD AUTO: 7.77 K/UL (ref 3.9–12.7)

## 2024-03-12 PROCEDURE — 63600175 PHARM REV CODE 636 W HCPCS: Performed by: HOSPITALIST

## 2024-03-12 PROCEDURE — 85027 COMPLETE CBC AUTOMATED: CPT | Performed by: HOSPITALIST

## 2024-03-12 PROCEDURE — G0378 HOSPITAL OBSERVATION PER HR: HCPCS

## 2024-03-12 PROCEDURE — 25000003 PHARM REV CODE 250: Performed by: HOSPITALIST

## 2024-03-12 PROCEDURE — 96376 TX/PRO/DX INJ SAME DRUG ADON: CPT

## 2024-03-12 PROCEDURE — 36415 COLL VENOUS BLD VENIPUNCTURE: CPT | Performed by: HOSPITALIST

## 2024-03-12 PROCEDURE — 80053 COMPREHEN METABOLIC PANEL: CPT | Performed by: HOSPITALIST

## 2024-03-12 RX ORDER — CLINDAMYCIN HYDROCHLORIDE 300 MG/1
300 CAPSULE ORAL 4 TIMES DAILY
Qty: 24 CAPSULE | Refills: 0 | Status: SHIPPED | OUTPATIENT
Start: 2024-03-12 | End: 2024-03-18

## 2024-03-12 RX ADMIN — VANCOMYCIN HYDROCHLORIDE 1500 MG: 1.5 INJECTION, POWDER, LYOPHILIZED, FOR SOLUTION INTRAVENOUS at 06:03

## 2024-03-12 RX ADMIN — MUPIROCIN: 20 OINTMENT TOPICAL at 09:03

## 2024-03-12 RX ADMIN — CEFTRIAXONE 2 G: 2 INJECTION, POWDER, FOR SOLUTION INTRAMUSCULAR; INTRAVENOUS at 03:03

## 2024-03-12 NOTE — NURSING
Pt declined a new IV offered after patient notified RN of IV leaking. Pt requested to pause Vancomycin until after shift change.

## 2024-03-12 NOTE — DISCHARGE SUMMARY
[facility] Medicine  Discharge Summary      Patient Name: Danilo Zambrano  MRN: [id number]  CITLALI: [id number]  Patient Class: OP- Observation  Admission Date: 3/9/2024  Hospital Length of Stay: 0 days  Discharge Date and Time: 3/12/2024  5:30 PM  Attending Physician: Mehul Sandoval MD   Discharging Provider: Mehul Sandoval MD  Primary Care Provider: Zhegn Dobbins MD (Inactive)  Hospital Medicine Team: [facility] Mehul Sandoval MD  Primary Care Team: [facility]    HPI:   Danilo Zambrano is a 43 year old white man with obesity, fatty liver. He lives in [place], [place]. He is .    He presented to [facility] Emergency Department on Saturday 3/9/2024 with left leg erythema and pain associated with fevers and chills. He had been walking with sandals outside through grass and on sidewalks, and it was raining often, so he may have walked through puddles as well. He had food poisoning on Thursday causing muscle cramps in his thighs. He was feeling very ill so did not wash whatever he picked up outside off his legs. The pain worsened and he noticed the erythema on his left leg on the day of presentation. The pain was severe whenever he put weight or walked on the leg. In the emergency department, his temperature was 100.1° Fahrenheit, his pulse was 100 bpm, and his W[place] was elevated at 73188/uL, CRP was elevated at 293.7 mg/L, and total bilrubin was elevated at 3.1 mg/dL. He was given vancomycin, clindamycin, and doxycycline. Imaging showed cellulitis with no abscess. He was seen by General Surgery. He was admitted to Hospital Medicine Team W.    * No surgery found *      Hospital Course:   He was put on vancomycin and topical mupirocin. He was given a dose of oxycodone by the night shift, but it did not help much. Acetaminophen and ketorolac helped the pain more. Leukocytosis and tachycardia resolved. Erythema spread to the posterior leg. Cefazolin  was added. It was changed to ceftriaxone on 3/11/2024. Symptoms dramatically improved on 3/12/2024. Total bilirubin decreased to normal. He was prescribed clindamycin and discharged home.      Goals of Care Treatment Preferences:  Code Status: Full Code      Consults:   Consults (From admission, onward)          Status Ordering Provider     Pharmacy to dose Vancomycin consult  Once        Provider:  (Not yet assigned)   See Matt for full Linked Orders Report.    Acknowledged DANE CROWELL A     Inpatient consult to General Surgery  Once        Provider:  (Not yet assigned)    DOREEN Goins          Final Active Diagnoses:    Diagnosis Date Noted POA    PRINCIPAL PROBLEM:  Left leg cellulitis [L03.116] 03/09/2024 Yes    Obesity (BMI 30-39.9) [E66.9] 06/21/2023 Yes     Chronic      Problems Resolved During this Admission:    Diagnosis Date Noted Date Resolved POA    Sepsis due to cellulitis [L03.90, A41.9] 03/09/2024 03/10/2024 Yes    Elevated bilirubin [R17] 08/28/2023 03/12/2024 Yes       Discharged Condition: good    Disposition: Home or Self Care    Follow Up:   Follow-up Information       Zheng Dobbins MD Follow up.    Why: If symptoms worsen, As needed  Contact information:  [address]  [address] [address] [phone]                           Patient Instructions:      Diet Adult Regular     Notify your health care provider if you experience any of the following:  persistent nausea and vomiting or diarrhea     Notify your health care provider if you experience any of the following:  worsening rash     Activity as tolerated       Significant Diagnostic Studies:   CT Thigh With Contrast Left CT Leg (Tibia-Fibula) Wtih Contrast Left 3/09/24: FINDINGS:   Slightly enlarged left inguinal nodes, an index node 1.4 cm (2:249) short axis.   Subtle subcutaneous soft tissue edema proximal anterior thigh.   Subtle circumferential subcutaneous soft tissue edema of the distal  left lower extremity/calf region.   There is no soft tissue abscess.  No osseous abnormality seen.   No abnormal enhancement of the musculature or intermuscular fascia region.   The remainder of the visualized osseous structures and soft tissues appear normal.   No soft tissue air, no foreign body.   Impression:  Nonspecific reactive nodes in the left inguinal region associated with mild soft tissue edema of the anterior superior left thigh.   Mild nonspecific circumferential subcutaneous soft tissue edema of the distal left lower extremity.     Medications:  Reconciled Home Medications:      Medication List        START taking these medications      clindamycin 300 MG capsule  Commonly known as: CLEOCIN  Take 1 capsule (300 mg total) by mouth 4 (four) times daily. for 6 days            CONTINUE taking these medications      finasteride 1 mg tablet  Commonly known as: PROPECIA  Take 1 mg by mouth once daily.     minoxidiL 2 % external solution  Commonly known as: ROGAINE  Apply 1 mL topically 2 (two) times daily.     rosuvastatin 10 MG tablet  Commonly known as: CRESTOR  Take 1 tablet (10 mg total) by mouth once daily.     valACYclovir 500 MG tablet  Commonly known as: VALTREX  TAKE 1 TABLET BY MOUTH TWICE DAILY FOR OUTBREAK/RECURRENCE EPISODES     WEGOVY 2.4 mg/0.75 mL Pnij  Generic drug: semaglutide (weight loss)  Inject 2.4 mg into the skin every 7 days.              Indwelling Lines/Drains at time of discharge: none    Time spent on the discharge of patient: 35 minutes         Mehul Sandoval MD  Department of Hospital Medicine  [facility] Surg

## 2024-03-12 NOTE — PLAN OF CARE
APPOINTMENT:    Patient Appointment(s) scheduled with  Coco Philip MD Thursday Mar 14, 2024 9:30 AM

## 2024-03-13 ENCOUNTER — OFFICE VISIT (OUTPATIENT)
Dept: BARIATRICS | Facility: CLINIC | Age: 44
End: 2024-03-13
Payer: COMMERCIAL

## 2024-03-13 VITALS
HEART RATE: 93 BPM | SYSTOLIC BLOOD PRESSURE: 124 MMHG | BODY MASS INDEX: 30.31 KG/M2 | OXYGEN SATURATION: 98 % | HEIGHT: 74 IN | WEIGHT: 236.13 LBS | DIASTOLIC BLOOD PRESSURE: 78 MMHG

## 2024-03-13 DIAGNOSIS — E66.9 OBESITY, CLASS I, BMI 30.0-34.9 (SEE ACTUAL BMI): ICD-10-CM

## 2024-03-13 PROCEDURE — 99999 PR PBB SHADOW E&M-EST. PATIENT-LVL IV: CPT | Mod: PBBFAC,,, | Performed by: INTERNAL MEDICINE

## 2024-03-13 PROCEDURE — 99214 OFFICE O/P EST MOD 30 MIN: CPT | Mod: S$GLB,,, | Performed by: INTERNAL MEDICINE

## 2024-03-13 RX ORDER — SULFAMETHOXAZOLE AND TRIMETHOPRIM 800; 160 MG/1; MG/1
1 TABLET ORAL 2 TIMES DAILY
COMMUNITY
Start: 2024-02-20

## 2024-03-13 RX ORDER — SEMAGLUTIDE 2.4 MG/.75ML
2.4 INJECTION, SOLUTION SUBCUTANEOUS
Qty: 9 ML | Refills: 1 | Status: SHIPPED | OUTPATIENT
Start: 2024-03-13

## 2024-03-13 NOTE — PROGRESS NOTES
Pt discharged, he left [facility] in wheelchair propelled by in house transport, father was at his side, discharge instructions reviewed with pt all questions answered, meds were delivered to the bedside.

## 2024-03-13 NOTE — PLAN OF CARE
[facility] - Med Surg  Discharge Final Note    Primary Care Provider: Zheng Dobbins MD (Inactive)    Expected Discharge Date: 3/12/2024      Patient discharged to home with no needs. Discharge Plan A and Plan B have been determined by review of patient's clinical status, future medical and therapeutic needs, and coverage/benefits for post-acute care in coordination with multidisciplinary team members.  Final Discharge Note (most recent)       Final Note - 03/13/24 0902          Final Note    Assessment Type Final Discharge Note (P)      Anticipated Discharge Disposition Home or Self Care (P)      Hospital Resources/Appts/Education Provided Appointments scheduled and added to AVS (P)         Post-Acute Status    Coverage [employer] Managed choice (P)      Discharge Delays None known at this time (P)                      Important Message from Medicare             Contact Info       Zheng Dobbins MD   Relationship: PCP - General    [facility] [address]  [address] [address]   Phone: [phone]       Next Steps: Follow up    Instructions: If symptoms worsen, As needed            ALLEN Barlow  Case Management  [phone]

## 2024-03-13 NOTE — PROGRESS NOTES
"Subjective     Patient ID: Danilo Zambrano is a 43 y.o. male.    Chief Complaint: Follow-up    CC: weight    Pt here today for follow-up. Has lost 66.6 lbs (-14.1 lbs muscle. -33.9  lbs fat). Was to start [address] [employer] pb meal planner, exercise, and currently on wegovy 2.4 mg weekly. Denies SE. He feels like he is doing better with eating. Having smoothie shaun smoothies, eggs, berries, turkey, cereal. Exercise- has been going to the park running and some other exercise.   Was just discharged with cellulitis.     New BMR: 2022    New PBF: 28.6%      Initial:  BMR:2247    PBF: 34.6%  Lab Results       Component                Value               Date                       ALT                      14                  03/12/2024                 AST                      16                  03/12/2024                 ALKPHOS                  59                  03/12/2024                 BILITOT                  0.9                 03/12/2024                Lab Results       Component                Value               Date                       HGBA1C                   5.6                 06/13/2023                 HGBA1C                   5.5                 11/18/2021                 HGBA1C                   5.2                 06/19/2019            Lab Results       Component                Value               Date                       LDLCALC                  68.8                08/12/2022                 CREATININE               0.9                 03/12/2024                  Review of Systems       Objective   /78 (BP Location: Left arm, Patient Position: Sitting, BP Method: Large (Manual))   Pulse 93   Ht 6' 2" (1.88 m)   Wt 107.1 kg (236 lb 1.6 oz)   SpO2 98%   BMI 30.31 kg/m²    Physical Exam  Vitals reviewed.   Constitutional:       General: He is not in acute distress.     Appearance: He is well-developed. He is obese.   HENT:      Head: Normocephalic and atraumatic.   Eyes:      General: No " scleral icterus.     Pupils: Pupils are equal, round, and reactive to light.   Neck:      Thyroid: No thyromegaly.   Cardiovascular:      Rate and Rhythm: Normal rate and regular rhythm.   Pulmonary:      Effort: Pulmonary effort is normal. No respiratory distress.   Musculoskeletal:         General: Normal range of motion.      Cervical back: Normal range of motion and neck supple.   Skin:     General: Skin is warm and dry.   Neurological:      Mental Status: He is alert and oriented to person, place, and time.   Psychiatric:         Behavior: Behavior normal.         Judgment: Judgment normal.            Assessment and Plan     1. Obesity, Class I, BMI 30.0-34.9 (see actual BMI)  -     semaglutide, weight loss, (WEGOVY) 2.4 mg/0.75 mL PnIj; Inject 2.4 mg into the skin every 7 days.  Dispense: 9 mL; Refill: 1            Danilo Lockett was seen today for follow-up.    Diagnoses and all orders for this visit:    Obesity, Class I, BMI 30.0-34.9 (see actual BMI)  -     semaglutide, weight loss, (WEGOVY) 2.4 mg/0.75 mL PnIj; Inject 2.4 mg into the skin every 7 days.            Wegovy2.4 mg once a week       Decrease portions as soon as you start wegovy. Avoid fried, rich or greasy foods.      Some nausea in the first 2 weeks is not unusual.     If you get pain across the upper abdomen and around to your back, please call the office.       [email or web address] to sign up for coupon card.     Exercise 30 min 3 days a week    Increase low impact activity as tolerated.  Avoid high impact activity, very heavy lifting or other exercise regimens that may cause discomfort.     https://[employer]ph[employer]meals.com, Www[employer].Tilt, https://[employer].com for meal options.     1800 k[address] pb meal planner, meal  ideas and exercise handouts given previously.   Solutions for constipation:   Miralax 1 capful a day in calorie free liquid. Hold if diarrhea.     Magnesium 400 mg at bedtime.     Benefiber 1 scoop in protein shake. Plenty of water.          Sample Weight Training Plan given       No follow-ups on file.

## 2024-03-13 NOTE — PATIENT INSTRUCTIONS
Wegovy2.4 mg once a week       Decrease portions as soon as you start wegovy. Avoid fried, rich or greasy foods.      Some nausea in the first 2 weeks is not unusual.     If you get pain across the upper abdomen and around to your back, please call the office.       Www[email or web address] to sign up for coupon card.     Exercise 30 min 3 days a week    Increase low impact activity as tolerated.  Avoid high impact activity, very heavy lifting or other exercise regimens that may cause discomfort.     [email or web address], Www[email or web address] [facility], https://[employer] for meal options.     [address] [employer] meal planner, meal  ideas and exercise handouts given previously.     Solutions for constipation:   Miralax 1 capful a day in calorie free liquid. Hold if diarrhea.     Magnesium 400 mg at bedtime.     Benefiber 1 scoop in protein shake. Plenty of water.         Sample Weight Training Plan ( adapted from [facility]):    1.Goblet Squat  How to:    Stand with feet hip-width apart and hold a weight vertically in front of chest, elbows pointing toward the floor.  Push hips back and bend knees to lower into a squat.  Drive through heels to stand back up to starting position. That's 1 rep. Complete three to five reps with a heavy weight.      2.Bent-Over Row  How to:    With a dumbbell in each hand and feet under hips, hinge at hips with knees slightly bent and arms just in front of legs.  Drive one elbow back toward hips, feeling shoulder blades squeeze together, pulling weight toward side body.  Slowly lower weight back down, then repeat with other arm. That's 1 rep. Complete three to five reps with a medium-heavy weight.        3.Lateral Lunge  How to:    Holding a weight at chest or dumbbells at each side, stand up straight with feet hip-width apart.  Take a large step to the right, sit hips back, and lower down until right knee is nearly parallel with the floor. Your left leg should be straight.  Return to  start. That's 1 rep. Complete 10 reps on each side.      4.Chest Press  How to:    Lie flat on back, or on a bench, with feet flat on the ground. With a dumbbell in each hand, extend arms directly over shoulders, palms facing toward feet.  Squeeze shoulder blades together and slowly bend elbows, lowering the weights out to the side, parallel with shoulders, until elbows form 90-degree angles.  Slowly drive the dumbbells back up to start, squeezing shoulder blades the entire time. Thats 1 rep. Complete three to five reps with a medium-heavy weight.      5.Split Stance Shoulder Press    How to:    Grab a pair of dumbbells or a resistance band. Stagger stance into a wide step, one foot forward and one back with hips squared, and hold the weights or band just above shoulders, elbows close to sides.  Leaning forward ever so slightly, bend both knees, and press through front heel while simultaneously lifting the weights or band to the geni, keeping elbows forward and arms in line with your ears.  Lower weights or band back to shoulders. That's 1 rep. Do 10 reps, alternating side for each set.        6.Alternating Reverse Lunge To Bicep Curl  How to:    Grab a pair of dumbbells and hold them at arm's length next to sides, palms facing each other. Stand tall with feet hip-width apart.  Step backward with right leg and lower body until front knee is bent 90 degrees. At the same time as you lunge, curl both dumbbells up to shoulders.  Lower the dumbbells as you return to the starting position. Step back with the other leg and repeat.That's 1 rep. Complete 12 reps with a medium weight.

## 2024-03-14 ENCOUNTER — OFFICE VISIT (OUTPATIENT)
Dept: INTERNAL MEDICINE | Facility: CLINIC | Age: 44
End: 2024-03-14
Payer: COMMERCIAL

## 2024-03-14 VITALS
DIASTOLIC BLOOD PRESSURE: 78 MMHG | HEIGHT: 74 IN | HEART RATE: 76 BPM | WEIGHT: 236.75 LBS | SYSTOLIC BLOOD PRESSURE: 112 MMHG | OXYGEN SATURATION: 99 % | BODY MASS INDEX: 30.38 KG/M2

## 2024-03-14 DIAGNOSIS — Z09 HOSPITAL DISCHARGE FOLLOW-UP: ICD-10-CM

## 2024-03-14 DIAGNOSIS — L03.116 LEFT LEG CELLULITIS: Primary | ICD-10-CM

## 2024-03-14 LAB
BACTERIA BLD CULT: NORMAL
BACTERIA BLD CULT: NORMAL

## 2024-03-14 PROCEDURE — 99213 OFFICE O/P EST LOW 20 MIN: CPT | Mod: S$GLB,,, | Performed by: STUDENT IN AN ORGANIZED HEALTH CARE EDUCATION/TRAINING PROGRAM

## 2024-03-14 PROCEDURE — 99999 PR PBB SHADOW E&M-EST. PATIENT-LVL IV: CPT | Mod: PBBFAC,,, | Performed by: STUDENT IN AN ORGANIZED HEALTH CARE EDUCATION/TRAINING PROGRAM

## 2024-03-14 NOTE — PATIENT INSTRUCTIONS
You may stop using the antibiotic cream. You can use vaseline or aquafor to keep the area moisturized. Continue your oral antibiotic as prescribed. You may use an NSAID such as ibuprofen or naproxen for pain.

## 2024-03-14 NOTE — PROGRESS NOTES
[facility] FOLLOW-UP VISIT      CHIEF COMPLAINT:   Chief Complaint   Patient presents with    Hospital Follow Up       HISTORY OF PRESENT ILLNESS: Danilo Zambrano is a 43 y.o. male who presents here today for follow-up of hospitalization. He was admitted 3/9-3/12 after presenting to the ED with left leg redness and pain, fever, and chills. He was started on IV antibiotics for cellulitis and admitted. By 3/12, symptoms had improved and he was transitioned to oral clindamycin and discharged home.     Since discharge, patient continues to have redness of his leg but overall better. Swelling has nearly resolved completely. Pain has improved dramatically. Denies any further fever or chills. He has been compliant with clindamycin. He was also told to use mupirocin cream but is running out. He has noticed some increased dryness of the area.       MEDICAL HISTORY:    Past Medical History:   Diagnosis Date    Genital herpes     Stable angina 07/03/2017       MEDICATIONS:    Current Outpatient Medications on File Prior to Visit   Medication Sig Dispense Refill    clindamycin (CLEOCIN) 300 MG capsule Take 1 capsule (300 mg total) by mouth 4 (four) times daily. for 6 days 24 capsule 0    finasteride (PROPECIA) 1 mg tablet Take 1 mg by mouth once daily.      minoxidiL (ROGAINE) 2 % external solution Apply 1 mL topically 2 (two) times daily.      rosuvastatin (CRESTOR) 10 MG tablet Take 1 tablet (10 mg total) by mouth once daily. 90 tablet 3    semaglutide, weight loss, (WEGOVY) 2.4 mg/0.75 mL PnIj Inject 2.4 mg into the skin every 7 days. 9 mL 1    sulfamethoxazole-trimethoprim 800-160mg (BACTRIM DS) 800-160 mg Tab Take 1 tablet by mouth 2 (two) times daily.      valACYclovir (VALTREX) 500 MG tablet TAKE 1 TABLET BY MOUTH TWICE DAILY FOR OUTBREAK/RECURRENCE EPISODES 90 tablet 0     No current facility-administered medications on file prior to visit.       PHYSICAL EXAM:    /78 (BP Location: Left  "arm, Patient Position: Sitting)   Pulse 76   Ht 6' 2" (1.88 m)   Wt 107.4 kg (236 lb 12.4 oz)   SpO2 99%   BMI 30.40 kg/m²     Physical Exam  Vitals and nursing note reviewed.   Constitutional:       General: He is not in acute distress.     Appearance: Normal appearance. He is not ill-appearing, toxic-appearing or diaphoretic.   HENT:      Head: Normocephalic and atraumatic.      Nose: Nose normal.   Eyes:      Extraocular Movements: Extraocular movements intact.      Conjunctiva/sclera: Conjunctivae normal.      Pupils: Pupils are equal, round, and reactive to light.   Cardiovascular:      Rate and Rhythm: Normal rate.   Pulmonary:      Effort: Pulmonary effort is normal. No respiratory distress.   Musculoskeletal:         General: No deformity. Normal range of motion.   Skin:     Findings: Erythema (anterior LLE) present. No rash.   Neurological:      General: No focal deficit present.      Mental Status: He is alert.      Motor: No weakness.      Gait: Gait normal.   Psychiatric:         Mood and Affect: Mood normal.         Behavior: Behavior normal.         Thought Content: Thought content normal.         Judgment: Judgment normal.              ASSESSMENT & PLAN:    Danilo Lockett was seen today for hospital follow up.    Diagnoses and all orders for this visit:    Left leg cellulitis  Hospital discharge follow-up  Patient admitted 3/9-3/12 for LLE cellulitis, initially treated with IV antibiotics and de-escalated to oral clindamycin. Patient reports improved erythema, nearly resolved pain, and no further fever/chills.   On exam, erythema of anterior LLE noted. No swelling, warmth, open wounds.  Continue Clindamycin course as prescribed.   NSAID PRN for pain.   Return/ED precautions discussed.          Coco Philip MD  [facility]    "

## 2024-06-17 DIAGNOSIS — E66.9 OBESITY, CLASS I, BMI 30.0-34.9 (SEE ACTUAL BMI): ICD-10-CM

## 2024-06-19 ENCOUNTER — PATIENT MESSAGE (OUTPATIENT)
Dept: BARIATRICS | Facility: CLINIC | Age: 44
End: 2024-06-19
Payer: COMMERCIAL

## 2024-08-05 RX ORDER — SEMAGLUTIDE 2.4 MG/.75ML
2.4 INJECTION, SOLUTION SUBCUTANEOUS
Qty: 9 ML | Refills: 0 | Status: SHIPPED | OUTPATIENT
Start: 2024-08-05

## 2024-08-07 ENCOUNTER — PATIENT MESSAGE (OUTPATIENT)
Dept: BARIATRICS | Facility: CLINIC | Age: 44
End: 2024-08-07
Payer: COMMERCIAL

## 2024-08-07 DIAGNOSIS — E66.9 OBESITY, CLASS I, BMI 30.0-34.9 (SEE ACTUAL BMI): ICD-10-CM

## 2024-08-12 RX ORDER — SEMAGLUTIDE 2.4 MG/.75ML
2.4 INJECTION, SOLUTION SUBCUTANEOUS
Qty: 9 ML | Refills: 0 | Status: SHIPPED | OUTPATIENT
Start: 2024-08-12

## 2024-09-17 ENCOUNTER — PROCEDURE VISIT (OUTPATIENT)
Dept: HEPATOLOGY | Facility: CLINIC | Age: 44
End: 2024-09-17
Payer: COMMERCIAL

## 2024-09-17 ENCOUNTER — OFFICE VISIT (OUTPATIENT)
Dept: HEPATOLOGY | Facility: CLINIC | Age: 44
End: 2024-09-17
Payer: COMMERCIAL

## 2024-09-17 ENCOUNTER — LAB VISIT (OUTPATIENT)
Dept: LAB | Facility: HOSPITAL | Age: 44
End: 2024-09-17
Payer: COMMERCIAL

## 2024-09-17 VITALS — HEIGHT: 74 IN | BODY MASS INDEX: 31.27 KG/M2 | WEIGHT: 243.63 LBS

## 2024-09-17 DIAGNOSIS — R79.89 ELEVATED FERRITIN: ICD-10-CM

## 2024-09-17 DIAGNOSIS — K76.0 FATTY LIVER: ICD-10-CM

## 2024-09-17 DIAGNOSIS — K76.0 METABOLIC DYSFUNCTION-ASSOCIATED STEATOTIC LIVER DISEASE (MASLD): Primary | ICD-10-CM

## 2024-09-17 DIAGNOSIS — E66.09 CLASS 1 OBESITY DUE TO EXCESS CALORIES WITH SERIOUS COMORBIDITY AND BODY MASS INDEX (BMI) OF 31.0 TO 31.9 IN ADULT: ICD-10-CM

## 2024-09-17 PROBLEM — R74.8 ELEVATED LIVER ENZYMES: Status: RESOLVED | Noted: 2019-07-08 | Resolved: 2024-09-17

## 2024-09-17 PROBLEM — E66.9 OBESITY (BMI 30-39.9): Chronic | Status: RESOLVED | Noted: 2023-06-21 | Resolved: 2024-09-17

## 2024-09-17 LAB
ALBUMIN SERPL BCP-MCNC: 4.6 G/DL (ref 3.5–5.2)
ALP SERPL-CCNC: 82 U/L (ref 55–135)
ALT SERPL W/O P-5'-P-CCNC: 22 U/L (ref 10–44)
AST SERPL-CCNC: 21 U/L (ref 10–40)
BILIRUB DIRECT SERPL-MCNC: 0.5 MG/DL (ref 0.1–0.3)
BILIRUB SERPL-MCNC: 1.4 MG/DL (ref 0.1–1)
FERRITIN SERPL-MCNC: 343 NG/ML (ref 20–300)
PROT SERPL-MCNC: 7.8 G/DL (ref 6–8.4)

## 2024-09-17 PROCEDURE — 80076 HEPATIC FUNCTION PANEL: CPT | Performed by: NURSE PRACTITIONER

## 2024-09-17 PROCEDURE — 99999 PR PBB SHADOW E&M-EST. PATIENT-LVL III: CPT | Mod: PBBFAC,,, | Performed by: NURSE PRACTITIONER

## 2024-09-17 PROCEDURE — 82728 ASSAY OF FERRITIN: CPT | Performed by: NURSE PRACTITIONER

## 2024-09-17 PROCEDURE — 91200 LIVER ELASTOGRAPHY: CPT | Mod: S$GLB,,, | Performed by: NURSE PRACTITIONER

## 2024-09-17 PROCEDURE — 99214 OFFICE O/P EST MOD 30 MIN: CPT | Mod: S$GLB,,, | Performed by: NURSE PRACTITIONER

## 2024-09-17 PROCEDURE — 36415 COLL VENOUS BLD VENIPUNCTURE: CPT | Performed by: NURSE PRACTITIONER

## 2024-09-17 NOTE — PATIENT INSTRUCTIONS
1. Fibroscan to look for fat or scar tissue in the liver showed ~40% fat in the liver, no scar tissue in the liver   2.  Follow up in 1 year with fibroscan before    These things are important to improve fatty liver:  Limit alcohol consumption, no more than 2 serving(s) of alcohol in any day (1 serving is 5 ounces of wine, 12 ounces of beer, or 1.5 ounces of liquor) and do NOT recommend any daily alcohol use    2 Weight loss goal of 20 lbs. [facility] offers benefits to patients so let me know if you want a referral to the [facility] gym. Also, [facility] has dieticians that can create a weight loss plan. If you are interested let me know and I can place a referral. If so, contact the dietician team at [facility] at email [email or web address] or call [phone].  to get scheduled. They do offer video visits   3. Avoid processed foods. No fried/fast foods. No sugary drinks or drinks with any calories.   4. Low carb/sugar and high protein diet (~ 120 grams of protein daily).Try to limit your carb intake to LESS than 30-45 grams of carbs with a meal or LESS than 5-10 grams with any snack (total of any snack foods eaten during that time). Use [employer] or Lose It odessa to add up your carbs through the day. Do NOT drink any beverages with calories or carbs (this can lead to high blood sugar and weight gain). The main thing to focus on is high protein, low carb)  5. Exercise, 5 days per week, 30 minutes per day, as tolerated  6. Recommend good cholesterol, blood pressure, blood sugar levels   7. There is a new medication called Rezdiffra for the treatment of F2-F3 liver scarring due to fatty liver. It is only indicated for patients with significant scar tissue from fatty liver (not you currently)    In some people, fatty liver can progress to steatohepatitis (inflamatory fatty liver) and possibly to cirrhosis, increasing the risk for liver cancer, liver failure, and  death. Therefore, the lifestyle changes are very important to decrease this risk.

## 2024-09-17 NOTE — PROCEDURES
FibroScan [place] (Vibration Controlled Transient Elastography)    Date/Time: 9/17/2024 2:30 PM    Performed by: Allison Farrar NP  Authorized by: Allison Farrar NP    Probe:  M    Universal Protocol: Patient's identity, procedure and site were verified, confirmatory pause was performed.  Discussed procedure including risks and potential complications.  Questions answered.  Patient verbalizes understanding and wishes to proceed with VCTE.     Procedure: After providing explanations of the procedure, patient was placed in the supine position with right arm in maximum abduction to allow optimal exposure of right lateral abdomen.  Patient was briefly assessed, Testing was performed in the mid-axillary location, 50Hz Shear Wave pulses were applied and the resulting Shear Wave and Propagation Speed detected with a 3.5 MHz ultrasonic signal, using the FibroScan probe, Skin to liver capsule distance and liver parenchyma were accessed during the entire examination with the FibroScan probe, Patient was instructed to breathe normally and to abstain from sudden movements during the procedure, allowing for random measurements of liver stiffness. At least 10 Shear Waves were produced, Individual measurements of each Shear Wave were calculated.  Patient tolerated the procedure well with no complications.  Meets discharge criteria as was dismissed.  Rates pain 0 out of 10.  Patient will follow up with ordering provider to review results.    Findings  Median liver stiffness score:  4  CAP Reading: dB/m:  265    IQR/med %:  5  Interpretation  Fibrosis interpretation is based on medial liver stiffness - Kilopascal (kPa).    Fibrosis Stage:  F 0-1  Steatosis interpretation is based on controlled attenuation parameter - (dB/m).    Steatosis Grade:  S2

## 2024-09-17 NOTE — PROGRESS NOTES
[facility] VISIT FOLLOW UP NOTE    PCP: Zheng Dobbins MD (Inactive)     CHIEF COMPLAINT: Metabolic associated steatotic liver disease, elevated ferritin     HPI: This is a 44 y.o. male with PMH noted below, presenting for follow up of above    Previous serologic w/u negative for Azeem's, alpha-1 antitrypsin deficiency, autoimmune etiology, and viral hepatitis A, B and C  no [place] on labs   Elevated ferritin, will repeat and get HH DNA if it remains elevated       Prior serologic workup:   Lab Results   Component Value Date    SMOOTHMUSCAB Negative 1:40 07/02/2019    AMAIFA Negative 1:40 07/02/2019    IGGSERUM 989 07/02/2019    FERRITIN 301 (H) 08/21/2023    FESATURATED 21 08/21/2023    CERULOPLSM 45.0 07/02/2019    HEPBSAG Negative 06/25/2019    HEPCAB Non-reactive 03/09/2024    HEPAIGM Negative 06/25/2019       Risk factors for fatty liver include obesity    Liver fibrosis staging:  -- fibroscan in 2019 noted F2, S3 (kPA 8.1, )  -- fibroscan 8/2023 noted F0, S3 (kPA 4.3, )  -- fibroscan 9/2024 noted F0, S2 (kPA 4, )    Interval HPI: Presents today alone.   100 lb weight gain in the past ~5+ years, Currently 243, previously 271 lbs   No SSB  Trying to limit portions and carbs  Wegovy 2.4 mg weekly but hit a plateau    Labs done 3/2024 show normal transaminase levels (ALT > AST, elevated since at least 2019, improving with weight loss )  Platelets WNL, alk phos WNL  Synthetic liver functioning WNL    Lab Results   Component Value Date    ALT 14 03/12/2024    AST 16 03/12/2024    ALKPHOS 59 03/12/2024    BILITOT 0.9 03/12/2024    ALBUMIN 3.2 (L) 03/12/2024    INR 1.0 07/17/2020     03/12/2024       Abd U/S done 8/2023 showed fatty liver, focal fatty sparing, + splenomegaly (15cm)    Denies family history of liver disease . Rare Alcohol consumption, see below  Social History     Substance and Sexual Activity   Alcohol Use Yes    Comment: once a month max       + Immunity to  "Hep A - needs Hep B vaccine, previously sent to [facility] pharm and ID         Allergy and medication list reviewed and updated     PMHX:  has a past medical history of Genital herpes and Stable angina (07/03/2017).    PSHX:  has a past surgical history that includes Nasal septum surgery; cardiac cath (07/03/2017); Tonsillectomy; and Colonoscopy (N/A, 6/16/2023).    FAMILY HISTORY: Updated and reviewed in [facility]    SOCIAL HISTORY:   Social History     Substance and Sexual Activity   Alcohol Use Yes    Comment: once a month max       Social History     Substance and Sexual Activity   Drug Use No       ROS:   GENERAL: Denies fatigue  CARDIOVASCULAR: Denies edema  GI: Denies abdominal pain  SKIN: Denies rash, itching   NEURO: Denies confusion, memory loss, or mood changes    PHYSICAL EXAM:   Friendly White male, in no acute distress; alert and oriented to person, place and time  VITALS: Ht 6' 2" (1.88 m)   Wt 110.5 kg (243 lb 9.7 oz)   BMI 31.28 kg/m²   EYES: Sclerae anicteric  GI: Soft, non-tender, non-distended. No ascites.  EXTREMITIES:  No edema.  SKIN: Warm and dry. No jaundice. No telangectasias noted. No palmar erythema.  NEURO:  No asterixis.  PSYCH:  Thought and speech pattern appropriate. Behavior normal      EDUCATION:  See instructions discussed with patient in Instructions section of the After Visit Summary     ASSESSMENT & PLAN:  44 y.o. male with:  1.  Metabolic associated steatotic liver disease   - see HPI  -- Immunity to Hep A and B : see HPI  -- Fibroscan 9/2024 noted F0, S2 - will repeat yearly   -- Recommendations discussed with patient:  Limit alcohol consumption  2 Weight loss goal of 50-75 lbs  3. Low carb/sugar, high fiber and protein diet, limit your carb intake to LESS than 30-45 grams of carbs with a meal or LESS than 5-10 grams with any snack   4. Exercise, 5 days per week, 30 minutes per day, as tolerated  5. Recommend good cholesterol, blood pressure, blood sugar levels   6. No indication for " Rezdiffra, no fibroiss     2. Elevated ferritin   -- sero w/u in HPI, will obtain HH DNA with ferritin elevated     3. Obesity  -- Body mass index is 31.28 kg/m².   -- increases risk for fatty liver    4. Splenomegaly  Not r/t liver disease, no fibrosis, message previously sent to PCP          Follow up in about 1 year (around 9/17/2025). With lab and fibroscan before  Orders Placed This Encounter   Procedures    FibroScan Transplant Hepatology(Vibration Controlled Transient Elastography)    Hepatic Function Panel    Hemochromatosis DNA Analysis (PCR)        Thank you for allowing me to participate in the care of MIS Church    I spent a total of 30 minutes on the day of the visit.This includes face to face time and non-face to face time preparing to see the patient (eg, review of tests), obtaining and/or reviewing separately obtained history, documenting clinical information in the electronic or other health record, independently interpreting results and communicating results to the patient/family/caregiver, and coordinating care.         CC'ed note to:

## 2024-09-17 NOTE — Clinical Note
Can you schedule his fibroscan before his video visit next year (typically at least 1 day before)? Thanks!

## 2024-09-18 ENCOUNTER — TELEPHONE (OUTPATIENT)
Dept: HEPATOLOGY | Facility: CLINIC | Age: 44
End: 2024-09-18
Payer: COMMERCIAL

## 2024-09-18 ENCOUNTER — PATIENT MESSAGE (OUTPATIENT)
Dept: HEPATOLOGY | Facility: CLINIC | Age: 44
End: 2024-09-18
Payer: COMMERCIAL

## 2024-09-18 PROBLEM — E66.09 CLASS 1 OBESITY DUE TO EXCESS CALORIES WITH SERIOUS COMORBIDITY AND BODY MASS INDEX (BMI) OF 31.0 TO 31.9 IN ADULT: Status: ACTIVE | Noted: 2024-09-18

## 2024-09-18 PROBLEM — E66.811 CLASS 1 OBESITY DUE TO EXCESS CALORIES WITH SERIOUS COMORBIDITY AND BODY MASS INDEX (BMI) OF 31.0 TO 31.9 IN ADULT: Status: ACTIVE | Noted: 2024-09-18

## 2024-09-18 NOTE — TELEPHONE ENCOUNTER
----- Message from Allison Farrar NP sent at 9/18/2024  8:49 AM CDT -----  Can you schedule his fibroscan before his video visit next year (typically at least 1 day before)?  Thanks!

## 2024-09-19 ENCOUNTER — LAB VISIT (OUTPATIENT)
Dept: LAB | Facility: HOSPITAL | Age: 44
End: 2024-09-19
Attending: NURSE PRACTITIONER
Payer: COMMERCIAL

## 2024-09-19 DIAGNOSIS — R79.89 ELEVATED FERRITIN: ICD-10-CM

## 2024-09-19 PROCEDURE — 36415 COLL VENOUS BLD VENIPUNCTURE: CPT

## 2024-09-19 PROCEDURE — 81256 HFE GENE: CPT

## 2024-09-24 ENCOUNTER — PATIENT MESSAGE (OUTPATIENT)
Dept: HEPATOLOGY | Facility: CLINIC | Age: 44
End: 2024-09-24
Payer: COMMERCIAL

## 2024-09-24 LAB
GENETIC VARIANT DETAILS BLD/T: NORMAL
GENETICIST REVIEW: NORMAL
LAB TEST METHOD: NORMAL
MOL DX INTERP BLD/T QL: NORMAL
PROVIDER SIGNING NAME: NORMAL
SPECIMEN SOURCE: NORMAL

## 2024-12-07 ENCOUNTER — PATIENT MESSAGE (OUTPATIENT)
Dept: HEPATOLOGY | Facility: CLINIC | Age: 44
End: 2024-12-07
Payer: COMMERCIAL

## 2024-12-07 DIAGNOSIS — R79.89 ELEVATED FERRITIN: Primary | ICD-10-CM

## 2025-01-16 DIAGNOSIS — E66.811 OBESITY, CLASS I, BMI 30.0-34.9 (SEE ACTUAL BMI): ICD-10-CM

## 2025-01-17 ENCOUNTER — PATIENT MESSAGE (OUTPATIENT)
Dept: HEPATOLOGY | Facility: CLINIC | Age: 45
End: 2025-01-17
Payer: COMMERCIAL

## 2025-01-17 RX ORDER — SEMAGLUTIDE 2.4 MG/.75ML
2.4 INJECTION, SOLUTION SUBCUTANEOUS
Qty: 9 ML | Refills: 0 | Status: CANCELLED | OUTPATIENT
Start: 2025-01-17

## 2025-01-23 ENCOUNTER — TELEPHONE (OUTPATIENT)
Dept: BARIATRICS | Facility: CLINIC | Age: 45
End: 2025-01-23
Payer: COMMERCIAL

## 2025-01-29 ENCOUNTER — TELEPHONE (OUTPATIENT)
Dept: BARIATRICS | Facility: CLINIC | Age: 45
End: 2025-01-29
Payer: COMMERCIAL

## 2025-01-29 NOTE — TELEPHONE ENCOUNTER
----- Message from Tiffany sent at 1/29/2025 11:06 AM CST -----  Regarding: returning missed call  Contact: Pt @[phone]  Pt is returning a missed call from someone in the office and is asking for a return call back soon. Thanks.         Reason for call: to get schedule

## 2025-01-31 PROBLEM — Z14.8 CARRIER OF HEMOCHROMATOSIS HFE GENE MUTATION: Status: ACTIVE | Noted: 2025-01-31

## 2025-01-31 NOTE — PROGRESS NOTES
Subjective:       Patient ID: Danilo Zambrano is a 44 y.o. male.    Chief Complaint: Est Care; Elevated Ferritin      Referring Provider: Allison Farrar NP  Diagnosis: Elevated ferritin--300    Five years ago this patient had a ferritin of 2274.  (July 3rd 2019  The last ferritin on September 17, 2024 was 301     Treatment History:  8/2023: Abd U/S showed fatty liver, focal fatty sparing, + splenomegaly (15cm)     Interval History:   02/03/2025   44-year-old gentleman who has [place].  He was referred by his hepatology group for an elevated ferritin level.  And a positive hemochromatosis gene.   He reports he was 1st diagnosed with liver issues 5-6 years ago.  He was noted to have an elevated ferritin when he got sick after a conference.  He had significant workup.  He was diagnosed with fatty liver.  He has been followed ever since.  He has been on exercise and weight loss.  He was chronic heartburn indigestion.  He was occasional chest pain.  He was had a colonoscopy in the past because of fissures.  He has been depressed and anxious after gaining some more weight.  He lost some weight when he was diagnosed.  He still has a BMI of 33.    He reports a cardiac evaluation years ago with a nuclear stress test with chest pain.  He still has chest discomfort that waxes and wanes.  However it goes away with a cocktail of Tylenol Motrin and Tums.    He does not sleep well         Past Medical History:   Diagnosis Date    Genital herpes     Stable angina 07/03/2017      Past Surgical History:   Procedure Laterality Date    cardiac cath  07/03/2017    stable angina, Dr. Dobbins    COLONOSCOPY N/A 6/16/2023    Procedure: COLONOSCOPY;  Surgeon: Isaac Reyez MD;  Location: [facility] ([address] [place] [address]);  Service: Endoscopy;  Laterality: N/A;  golyte prep  prep instructions sent to pt via portal -  timing 1-4 weeks  6/12 - Precall: no answer. SG    NASAL SEPTUM SURGERY      TONSILLECTOMY       Social  History     Socioeconomic History    Marital status:    Tobacco Use    Smoking status: Never     Passive exposure: Never    Smokeless tobacco: Never   Substance and Sexual Activity    Alcohol use: Yes     Comment: once a month max    Drug use: No    Sexual activity: Yes     Social Drivers of Health     Financial Resource Strain: Low Risk  (3/11/2024)    Overall Financial Resource Strain (CARDIA)     Difficulty of Paying Living Expenses: Not hard at all   Food Insecurity: No Food Insecurity (3/11/2024)    Hunger Vital Sign     Worried About Running Out of Food in the Last Year: Never true     Ran Out of Food in the Last Year: Never true   Transportation Needs: No Transportation Needs (3/11/2024)    PRAPARE - Transportation     Lack of Transportation (Medical): No     Lack of Transportation (Non-Medical): No   Physical Activity: Insufficiently Active (3/11/2024)    Exercise Vital Sign     Days of Exercise per Week: 1 day     Minutes of Exercise per Session: 30 min   Stress: Stress Concern Present (3/11/2024)    [employer] - Occupational Stress Questionnaire     Feeling of Stress : To some extent   Housing Stability: Low Risk  (3/11/2024)    Housing Stability Vital Sign     Unable to Pay for Housing in the Last Year: No     Number of Places Lived in the Last Year: 1     Unstable Housing in the Last Year: No      Family History   Problem Relation Name Age of Onset    Heart disease Father Parag Maxneeta         40s    Heart disease Maternal Grandfather      Heart disease Paternal Grandfather      Heart disease Paternal Uncle Dar Zambrano     Cirrhosis Paternal Uncle Dar Zambrano       Review of patient's allergies indicates:  No Known Allergies     Current Outpatient Medications:     finasteride (PROPECIA) 1 mg tablet, Take 1 mg by mouth once daily., Disp: , Rfl:     rosuvastatin (CRESTOR) 10 MG tablet, Take 1 tablet (10 mg total) by mouth once daily., Disp: 90 tablet, Rfl: 3     valACYclovir (VALTREX) 500 MG tablet, Take 500 mg by mouth 2 (two) times daily., Disp: , Rfl:      Review of Systems   Constitutional:  Positive for appetite change and unexpected weight change.   HENT:  Negative for mouth sores.    Eyes:  Negative for visual disturbance.   Respiratory:  Negative for cough and shortness of breath.    Cardiovascular:  Positive for chest pain.   Gastrointestinal:  Negative for abdominal pain and diarrhea.   Genitourinary:  Negative for frequency.   Musculoskeletal:  Negative for back pain.   Skin:  Negative for rash.   Neurological:  Negative for headaches.   Hematological:  Negative for adenopathy.   Psychiatric/Behavioral:  The patient is nervous/anxious.        A complete 12 point ROS was performed in full with pertinent positives as described in interval history. Remainder of ROS done in full and are negative.        Objective:      Vitals:    02/03/25 1125   BP: 118/82   Pulse: (!) 57   Resp: 18   Temp: 97.8 °F (36.6 °C)      Physical Exam  Vitals reviewed.   Constitutional:       Appearance: Normal appearance.   HENT:      Head: Normocephalic and atraumatic.      Mouth/Throat:      Mouth: Mucous membranes are moist.      Pharynx: Oropharynx is clear.   Eyes:      Extraocular Movements: Extraocular movements intact.      Conjunctiva/sclera: Conjunctivae normal.      Pupils: Pupils are equal, round, and reactive to light.   Cardiovascular:      Rate and Rhythm: Normal rate and regular rhythm.      Pulses: Normal pulses.      Heart sounds: Normal heart sounds.   Pulmonary:      Effort: Pulmonary effort is normal.      Breath sounds: Normal breath sounds.   Abdominal:      General: Abdomen is flat. Bowel sounds are normal. There is no distension.      Palpations: Abdomen is soft.   Musculoskeletal:         General: Normal range of motion.      Cervical back: Normal range of motion and neck supple.   Skin:     General: Skin is warm and dry.   Neurological:      General: No focal  deficit present.      Mental Status: He is alert and oriented to person, place, and time. Mental status is at baseline.   Psychiatric:         Attention and Perception: Attention normal.         Mood and Affect: Mood is anxious.         Behavior: Behavior is cooperative.         Thought Content: Thought content normal.     LABS AND IMAGING REVIEWED IN EPIC       MRI impression:   1. Liver fat fraction measures 10.0%, consistent with mild steatosis.  2. Liver elasticity measures 2.46 kPa consistent with normal or F0 fibrosis.  3. Liver R2* value of 46.2 Hz consistent with no significant iron overload (LIC estimated at 1.47 mg/g).  4. Splenomegaly.    Assessment:   heterozygous for H63D.         This result is consistent with at minimum a carrier status for hereditary hemochromatosis (HH).      The presence of a single H63D variant is unlikely to be of clinical significance in the absence of other   disease-causing variants ---with no evidence of iron overload on his MRI elastograph, and a previous normal iron saturation, his elevated ferritin is most likely an acute phase reactant and related to his fatty liver as well.      Nonalcoholic steatohepatitis  History of hyperlipidemia   Elevated BMI   Previous use of weight loss drugs       Recommendations  Agree with exercise   Agree with weight loss   Agree with the avoidance of iron supplementation---another supplements that may have iron hidden then them .    Low iron diet.    Check his iron level and ferritin today.    If his iron level saturation or ferritin are increasing.  We could repeat an MRI again.  If stable would check once a year, and include AFP liver imaging  Could consider seeing hepatology here, Dr. Jossue Ortega  Refer to PCP new PCP    Orders Placed This Encounter    Iron and TIBC    Ferritin    Ambulatory referral/consult to Internal Medicine    Follow up for Pending labs today.   If his ferritin iron iron saturation or elevated, would order a repeat  MRI   If stable this could be checked once a year.       Jossue Holloway MD    Total time 45 minutes, 30 minutes was spent with the patient today, 15 minutes spent in review of documentation and old labs and orders

## 2025-02-03 ENCOUNTER — OFFICE VISIT (OUTPATIENT)
Dept: HEMATOLOGY/ONCOLOGY | Facility: CLINIC | Age: 45
End: 2025-02-03
Payer: COMMERCIAL

## 2025-02-03 VITALS
BODY MASS INDEX: 33.77 KG/M2 | WEIGHT: 263.13 LBS | DIASTOLIC BLOOD PRESSURE: 82 MMHG | HEIGHT: 74 IN | SYSTOLIC BLOOD PRESSURE: 118 MMHG | RESPIRATION RATE: 18 BRPM | OXYGEN SATURATION: 97 % | HEART RATE: 57 BPM | TEMPERATURE: 98 F

## 2025-02-03 DIAGNOSIS — R79.89 ELEVATED FERRITIN: Primary | ICD-10-CM

## 2025-02-03 DIAGNOSIS — Z14.8 CARRIER OF HEMOCHROMATOSIS HFE GENE MUTATION: ICD-10-CM

## 2025-02-03 DIAGNOSIS — Z75.8 DOES NOT HAVE PRIMARY CARE PROVIDER: ICD-10-CM

## 2025-02-03 LAB
FERRITIN SERPL-MCNC: 258.49 NG/ML (ref 21.81–274.66)
IRON SATN MFR SERPL: 19 % (ref 20–50)
IRON SERPL-MCNC: 57 UG/DL (ref 65–175)
TIBC SERPL-MCNC: 245 UG/DL (ref 60–240)
TIBC SERPL-MCNC: 302 UG/DL (ref 250–450)
TRANSFERRIN SERPL-MCNC: 270 MG/DL (ref 174–364)

## 2025-02-03 PROCEDURE — 82728 ASSAY OF FERRITIN: CPT | Performed by: INTERNAL MEDICINE

## 2025-02-03 PROCEDURE — 36415 COLL VENOUS BLD VENIPUNCTURE: CPT | Performed by: INTERNAL MEDICINE

## 2025-02-03 PROCEDURE — 99204 OFFICE O/P NEW MOD 45 MIN: CPT | Mod: S$GLB,,, | Performed by: INTERNAL MEDICINE

## 2025-02-03 PROCEDURE — 83550 IRON BINDING TEST: CPT | Performed by: INTERNAL MEDICINE

## 2025-02-03 PROCEDURE — 99999 PR PBB SHADOW E&M-EST. PATIENT-LVL V: CPT | Mod: PBBFAC,,, | Performed by: INTERNAL MEDICINE

## 2025-02-03 RX ORDER — VALACYCLOVIR HYDROCHLORIDE 500 MG/1
500 TABLET, FILM COATED ORAL 2 TIMES DAILY
COMMUNITY

## 2025-02-03 RX ORDER — FINASTERIDE 1 MG/1
1 TABLET, FILM COATED ORAL DAILY
COMMUNITY

## 2025-02-14 ENCOUNTER — OFFICE VISIT (OUTPATIENT)
Dept: BARIATRICS | Facility: CLINIC | Age: 45
End: 2025-02-14
Payer: COMMERCIAL

## 2025-02-14 VITALS
DIASTOLIC BLOOD PRESSURE: 88 MMHG | HEIGHT: 74 IN | SYSTOLIC BLOOD PRESSURE: 125 MMHG | OXYGEN SATURATION: 97 % | WEIGHT: 267.38 LBS | BODY MASS INDEX: 34.31 KG/M2 | HEART RATE: 85 BPM

## 2025-02-14 DIAGNOSIS — E66.811 OBESITY, CLASS I, BMI 30.0-34.9 (SEE ACTUAL BMI): Primary | ICD-10-CM

## 2025-02-14 DIAGNOSIS — R16.2 HEPATOSPLENOMEGALY: ICD-10-CM

## 2025-02-14 DIAGNOSIS — G47.9 SLEEP DISTURBANCE: ICD-10-CM

## 2025-02-14 RX ORDER — SEMAGLUTIDE 0.5 MG/.5ML
0.5 INJECTION, SOLUTION SUBCUTANEOUS
Qty: 2 ML | Refills: 0 | Status: SHIPPED | OUTPATIENT
Start: 2025-02-14

## 2025-02-14 RX ORDER — SEMAGLUTIDE 1 MG/.5ML
1 INJECTION, SOLUTION SUBCUTANEOUS
Qty: 3 ML | Refills: 0 | Status: SHIPPED | OUTPATIENT
Start: 2025-02-14

## 2025-02-14 RX ORDER — SEMAGLUTIDE 0.25 MG/.5ML
0.25 INJECTION, SOLUTION SUBCUTANEOUS
Qty: 2 ML | Refills: 0 | Status: CANCELLED | OUTPATIENT
Start: 2025-02-14

## 2025-02-14 RX ORDER — SEMAGLUTIDE 2.4 MG/.75ML
2.4 INJECTION, SOLUTION SUBCUTANEOUS
Qty: 9 ML | Refills: 0 | Status: CANCELLED | OUTPATIENT
Start: 2025-02-14

## 2025-02-14 NOTE — PROGRESS NOTES
"Subjective     Patient ID: Danilo Zambrano is a 44 y.o. male.    Chief Complaint: Follow-up    CC: weight    Pt here today for follow-up. Has lost 66.6 lbs (-7.1 lbs muscle. -33.3 lbs fat). Was to start [address] [employer] pb meal planner, exercise, was on wegovy 2.4 mg weekly.   Denies SE. He feels like he is doing better with eating. Having smoothie shaun smoothies, eggs, berries, turkey, cereal. Exercise- has been going to the [employer] running and some other exercise.     2/15/25 Pt has been off Wegovy for 5 weeks now d/t not being able to refill per pt. Pt has gained 7 lbs of muscle since last visit.   Pt wants to be referred to Bariatrician  in [place] to continue care    New BMR: 2197    New PBF: 30.3      Initial:   BMR:2247    PBF: 34.6%  Lab Results       Component                Value               Date                       ALT                      14                  03/12/2024                 AST                      16                  03/12/2024                 ALKPHOS                  59                  03/12/2024                 BILITOT                  0.9                 03/12/2024                Lab Results       Component                Value               Date                       HGBA1C                   5.6                 06/13/2023                 HGBA1C                   5.5                 11/18/2021                 HGBA1C                   5.2                 06/19/2019            Lab Results       Component                Value               Date                       LDLCALC                  68.8                08/12/2022                 CREATININE               0.9                 03/12/2024                  Review of Systems       Objective   /88   Pulse 85   Ht 6' 2" (1.88 m)   Wt 121.3 kg (267 lb 6.4 oz)   SpO2 97%   BMI 34.33 kg/m²    Physical Exam  Vitals reviewed.   Constitutional:       General: He is not in acute distress.     Appearance: He is well-developed. He is obese. " "  HENT:      Head: Normocephalic and atraumatic.   Eyes:      General: No scleral icterus.     Pupils: Pupils are equal, round, and reactive to light.   Neck:      Thyroid: No thyromegaly.   Cardiovascular:      Rate and Rhythm: Normal rate and regular rhythm.   Pulmonary:      Effort: Pulmonary effort is normal. No respiratory distress.   Musculoskeletal:         General: Normal range of motion.      Cervical back: Normal range of motion and neck supple.   Skin:     General: Skin is warm and dry.   Neurological:      Mental Status: He is alert and oriented to person, place, and time.   Psychiatric:         Behavior: Behavior normal.         Judgment: Judgment normal.            Assessment and Plan     1. Obesity, Class I, BMI 30.0-34.9 (see actual BMI)  -     Ambulatory referral/consult to Bariatric/Obesity Medicine; Future; Expected date: 02/21/2025    2. Sleep disturbance  -     Ambulatory referral/consult to Bariatric/Obesity Medicine; Future; Expected date: 02/21/2025    3. Hepatosplenomegaly  -     Ambulatory referral/consult to Bariatric/Obesity Medicine; Future; Expected date: 02/21/2025    Other orders  -     semaglutide, weight loss, (WEGOVY) 0.5 mg/0.5 mL PnIj; Inject 0.5 mg into the skin every 7 days.  Dispense: 2 mL; Refill: 0  -     semaglutide, weight loss, (WEGOVY) 1 mg/0.5 mL PnIj; Inject 1 mg into the skin every 7 days.  Dispense: 3 mL; Refill: 0          Danilo Love" was seen today for follow-up.    Diagnoses and all orders for this visit:    Obesity, Class I, BMI 30.0-34.9 (see actual BMI)  -     Ambulatory referral/consult to Bariatric/Obesity Medicine; Future    Sleep disturbance  -     Ambulatory referral/consult to Bariatric/Obesity Medicine; Future    Hepatosplenomegaly  -     Ambulatory referral/consult to Bariatric/Obesity Medicine; Future    Other orders  -     semaglutide, weight loss, (WEGOVY) 0.5 mg/0.5 mL PnIj; Inject 0.5 mg into the skin every 7 days.  -     semaglutide, " weight loss, (WEGOVY) 1 mg/0.5 mL PnIj; Inject 1 mg into the skin every 7 days.  The following orders have not been finalized:  -     Cancel: semaglutide, weight loss, (WEGOVY) 2.4 mg/0.75 mL PnIj  -     Cancel: semaglutide, weight loss, (WEGOVY) 0.25 mg/0.5 mL PnIj        Wegovy 0.5 mg once a week then increase to 1 mg after on month.     Decrease portions as soon as you start wegovy. Avoid fried, rich or greasy foods.     Some nausea in the first 2 weeks is not unusual.     If you get pain across the upper abdomen and around to your back, please call the office.     [email or web address] to sign up for coupon card.     Exercise 30 min 3 days a week    Increase low impact activity as tolerated.  Avoid high impact activity, very heavy lifting or other exercise regimens that may cause discomfort.     [email or web address], [employer]ww[email or web address], https://[email or web address] for meal options.     [address] pb meal planner, meal  ideas and exercise handouts given previously.   Solutions for constipation:   Miralax 1 capful a day in calorie free liquid. Hold if diarrhea.            No follow-ups on file.

## 2025-04-01 ENCOUNTER — TELEPHONE (OUTPATIENT)
Dept: INTERNAL MEDICINE | Facility: CLINIC | Age: 45
End: 2025-04-01
Payer: COMMERCIAL

## 2025-04-01 NOTE — TELEPHONE ENCOUNTER
----- Message from Nurse Meme sent at 4/1/2025 10:07 AM CDT -----  Regarding: cristy Werner 04/08 @1:00  No labs needed.

## 2025-04-07 RX ORDER — SEMAGLUTIDE 1 MG/.5ML
1 INJECTION, SOLUTION SUBCUTANEOUS
Qty: 3 ML | Refills: 1 | Status: SHIPPED | OUTPATIENT
Start: 2025-04-07 | End: 2025-04-08

## 2025-04-08 ENCOUNTER — OFFICE VISIT (OUTPATIENT)
Dept: INTERNAL MEDICINE | Facility: CLINIC | Age: 45
End: 2025-04-08
Payer: COMMERCIAL

## 2025-04-08 VITALS
DIASTOLIC BLOOD PRESSURE: 67 MMHG | SYSTOLIC BLOOD PRESSURE: 115 MMHG | OXYGEN SATURATION: 98 % | RESPIRATION RATE: 18 BRPM | HEIGHT: 74 IN | BODY MASS INDEX: 34.78 KG/M2 | WEIGHT: 271 LBS | HEART RATE: 76 BPM

## 2025-04-08 DIAGNOSIS — Z00.00 WELLNESS EXAMINATION: Primary | ICD-10-CM

## 2025-04-08 DIAGNOSIS — R79.89 ELEVATED FERRITIN: ICD-10-CM

## 2025-04-08 DIAGNOSIS — E78.5 HYPERLIPIDEMIA, UNSPECIFIED HYPERLIPIDEMIA TYPE: ICD-10-CM

## 2025-04-08 DIAGNOSIS — E66.811 OBESITY (BMI 30.0-34.9): ICD-10-CM

## 2025-04-08 DIAGNOSIS — R10.9 RIGHT FLANK PAIN: ICD-10-CM

## 2025-04-08 DIAGNOSIS — R79.89 LOW TESTOSTERONE IN MALE: ICD-10-CM

## 2025-04-08 DIAGNOSIS — E55.9 VITAMIN D DEFICIENCY: ICD-10-CM

## 2025-04-08 DIAGNOSIS — G47.33 OSA (OBSTRUCTIVE SLEEP APNEA): ICD-10-CM

## 2025-04-08 RX ORDER — TIRZEPATIDE 5 MG/.5ML
5 INJECTION, SOLUTION SUBCUTANEOUS
Qty: 4 PEN | Refills: 0 | Status: SHIPPED | OUTPATIENT
Start: 2025-05-06

## 2025-04-08 RX ORDER — TIRZEPATIDE 7.5 MG/.5ML
7.5 INJECTION, SOLUTION SUBCUTANEOUS
Qty: 4 PEN | Refills: 11 | Status: SHIPPED | OUTPATIENT
Start: 2025-06-02

## 2025-04-08 RX ORDER — TIRZEPATIDE 2.5 MG/.5ML
2.5 INJECTION, SOLUTION SUBCUTANEOUS
Qty: 4 PEN | Refills: 0 | Status: SHIPPED | OUTPATIENT
Start: 2025-04-08

## 2025-04-08 RX ORDER — ROSUVASTATIN CALCIUM 10 MG/1
10 TABLET, COATED ORAL DAILY
Qty: 90 TABLET | Refills: 3 | Status: SHIPPED | OUTPATIENT
Start: 2025-04-08

## 2025-04-08 RX ORDER — VALACYCLOVIR HYDROCHLORIDE 500 MG/1
500 TABLET, FILM COATED ORAL
Qty: 30 TABLET | Refills: 5 | Status: SHIPPED | OUTPATIENT
Start: 2025-04-08

## 2025-04-08 NOTE — PROGRESS NOTES
Patient ID: [id number]      Subjective:     Chief Complaint: [facility] (Right hip pain x one week)      Danilo Zambrano is a 45 y.o. male.  Is a 45-year-old man indicate [facility].  He has an interesting past medical history including fatty liver with an essentially negative workup other than obesity.  Also history of hyperlipidemia and placed on statin therapy because of strong family history of premature heart disease.  Obesity has been his biggest health issue and likely contributing to other problems.  He was hospitalized last year for cellulitis of the leg with complete resolution.  Questions of low testosterone syndrome being evaluated in [place].  He moved here about a year ago and that got lost to follow-up.  Also obesity treatment was being done with Wegovy and only recently was at resumed.  He felt like he had plateaued on Wegovy itself in his asking about perhaps using Zetia bound and I think that would be fine.    Over the past 5 or 6 days he has had increasing pain in the right flank.  Very positional.  No rash other than where he has been rubbing his right flank area.  No blood in the urine or dysuria.  No fever chills.    Home meds reviewed     No known allergies     Past medical history mostly as above.  He has had colonoscopy a couple of times in the past.  One years ago he has been evaluated for Crohn's and another time yet her perirectal fissure.  Also extensive workup for low testosterone syndrome including numerous pituitary blood levels that were normal.  Also liver workup was extensive and included testing for hemochromatosis, hepatitis-C a CBC, Azeem's disease, etc..    He does not smoke.  He drinks alcohol every few months.    He is  and works for [employer].  Something to do with [place].  He does not exercise.  His diet has been whatever his Handy.  He had in his wife do have 3 young children at home.    Family history notable for premature coronary  "disease.    Review of systems notable for obstructive sleep apnea.  Weight up and down depending on medications.  He did miss a few months of Wegovy in all the way he lost returned.  No fevers chills or sweats.  No chest pain shortness for breath orthopnea PND pedal edema.  No cough or wheezing.  No nausea or vomiting change in bowel habits.  No blood in his stools or melanotic stools.  No urgency frequency dysuria.        Review of Systems    Outpatient Medications Marked as Taking for the 4/8/25 encounter (Office Visit) with Sony Rose MD   Medication Sig Dispense Refill    [DISCONTINUED] finasteride (PROPECIA) 1 mg tablet Take 1 mg by mouth once daily.      [DISCONTINUED] rosuvastatin (CRESTOR) 10 MG tablet Take 1 tablet (10 mg total) by mouth once daily. 90 tablet 3    [DISCONTINUED] semaglutide, weight loss, (WEGOVY) 1 mg/0.5 mL PnIj INJECT 1 MG INTO THE SKIN EVERY 7 DAYS 3 mL 1    [DISCONTINUED] valACYclovir (VALTREX) 500 MG tablet Take 500 mg by mouth as needed.         Objective:     /67 (BP Location: Right arm, Patient Position: Sitting)   Pulse 76   Resp 18   Ht 6' 2" (1.88 m)   Wt 122.9 kg (271 lb)   SpO2 98%   BMI 34.79 kg/m²     Physical Exam  Vitals reviewed.   Constitutional:       Appearance: Normal appearance.   HENT:      Head: Normocephalic and atraumatic.      Right Ear: Tympanic membrane normal.      Left Ear: Tympanic membrane normal.      Mouth/Throat:      Pharynx: Oropharynx is clear.   Eyes:      Pupils: Pupils are equal, round, and reactive to light.   Neck:      Vascular: No carotid bruit.   Cardiovascular:      Rate and Rhythm: Normal rate and regular rhythm.      Pulses: Normal pulses.      Heart sounds: Normal heart sounds.   Pulmonary:      Effort: Pulmonary effort is normal.      Breath sounds: Normal breath sounds.   Abdominal:      General: Abdomen is flat.      Palpations: Abdomen is soft. There is no mass.      Tenderness: There is no abdominal tenderness. " There is no guarding.   Musculoskeletal:         General: No swelling.      Cervical back: Neck supple.   Lymphadenopathy:      Cervical: No cervical adenopathy.   Skin:     General: Skin is warm and dry.   Neurological:      General: No focal deficit present.      Mental Status: He is alert and oriented to person, place, and time.     Extensive prior testing reviewed    Assessment:     1. Wellness     2. Right-sided flank pain.  Likely musculoskeletal.  Consider possibility of early shingles.  Doubt but consider kidney disease.  Also consider lung pathology    3. Obstructive sleep apnea      4.  Obesity.      5. History of very low vitamin-D level at 13     6. Hyperlipidemia.  On statin therapy     7. Strong family history of premature heart disease     8. History of low testosterone, never treated     9. On Propecia for hair benefits          Plan:   Update lab work with CBC CMP lipid TSH urinalysis vitamin-D level free testosterone and total testosterone levels.  Also check x-rays of the chest in LS spine.    Replace Zetia bound with Wegovy for more weight loss.    Follow-up 3 months.  Problem List Items Addressed This Visit          Oncology    Elevated ferritin    Relevant Medications    tirzepatide, weight loss, (ZEPBOUND) 5 mg/0.5 mL PnIj (Start on 5/6/2025)    Other Relevant Orders    CBC Auto Differential    Comprehensive Metabolic Panel    Lipid Panel    TSH    Urinalysis    PSA, Screening    Vitamin D    TESTOSTERONE, FREE (DIALYSIS) AND TOTAL, LC/MS/MS    Urinalysis, Reflex to Urine Culture       Endocrine    Low testosterone in male    Relevant Medications    tirzepatide, weight loss, (ZEPBOUND) 5 mg/0.5 mL PnIj (Start on 5/6/2025)    Other Relevant Orders    CBC Auto Differential    Comprehensive Metabolic Panel    Lipid Panel    TSH    Urinalysis    PSA, Screening    Vitamin D    TESTOSTERONE, FREE (DIALYSIS) AND TOTAL, LC/MS/MS    Urinalysis, Reflex to Urine Culture     Other Visit Diagnoses          Wellness examination    -  Primary    Relevant Medications    tirzepatide, weight loss, (ZEPBOUND) 5 mg/0.5 mL PnIj (Start on 5/6/2025)    Other Relevant Orders    CBC Auto Differential    Comprehensive Metabolic Panel    Lipid Panel    TSH    Urinalysis    PSA, Screening    Vitamin D    TESTOSTERONE, FREE (DIALYSIS) AND TOTAL, LC/MS/MS    Urinalysis, Reflex to Urine Culture      OLIVIA (obstructive sleep apnea)        Relevant Medications    tirzepatide, weight loss, (ZEPBOUND) 5 mg/0.5 mL PnIj (Start on 5/6/2025)    Other Relevant Orders    CBC Auto Differential    Comprehensive Metabolic Panel    Lipid Panel    TSH    Urinalysis    PSA, Screening    Vitamin D    TESTOSTERONE, FREE (DIALYSIS) AND TOTAL, LC/MS/MS    Urinalysis, Reflex to Urine Culture      Right flank pain        Relevant Medications    tirzepatide, weight loss, (ZEPBOUND) 5 mg/0.5 mL PnIj (Start on 5/6/2025)    Other Relevant Orders    CBC Auto Differential    Comprehensive Metabolic Panel    Lipid Panel    TSH    Urinalysis    PSA, Screening    Vitamin D    TESTOSTERONE, FREE (DIALYSIS) AND TOTAL, LC/MS/MS    X-Ray Lumbar Spine 5 View    Urinalysis, Reflex to Urine Culture      Vitamin D deficiency        Relevant Medications    tirzepatide, weight loss, (ZEPBOUND) 5 mg/0.5 mL PnIj (Start on 5/6/2025)    Other Relevant Orders    CBC Auto Differential    Comprehensive Metabolic Panel    Lipid Panel    TSH    Urinalysis    PSA, Screening    Vitamin D    TESTOSTERONE, FREE (DIALYSIS) AND TOTAL, LC/MS/MS    Urinalysis, Reflex to Urine Culture      Obesity (BMI 30.0-34.9)        Relevant Medications    tirzepatide, weight loss, (ZEPBOUND) 5 mg/0.5 mL PnIj (Start on 5/6/2025)    Other Relevant Orders    CBC Auto Differential    Comprehensive Metabolic Panel    Lipid Panel    TSH    Urinalysis    PSA, Screening    Vitamin D    TESTOSTERONE, FREE (DIALYSIS) AND TOTAL, LC/MS/MS      Hyperlipidemia, unspecified hyperlipidemia type        Relevant Medications     tirzepatide, weight loss, (ZEPBOUND) 5 mg/0.5 mL PnIj (Start on 5/6/2025)    Other Relevant Orders    CBC Auto Differential    Comprehensive Metabolic Panel    Lipid Panel    TSH    Urinalysis    PSA, Screening    Vitamin D    TESTOSTERONE, FREE (DIALYSIS) AND TOTAL, LC/MS/MS             Orders Placed This Encounter   Procedures    X-Ray Lumbar Spine 5 View     Standing Status:   Future     Expected Date:   4/8/2025     Expiration Date:   4/8/2026     May the Radiologist modify the order per protocol to meet the clinical needs of the patient?:   Yes    CBC Auto Differential     Standing Status:   Future     Expected Date:   4/8/2025     Expiration Date:   7/8/2025    Comprehensive Metabolic Panel     Standing Status:   Future     Expected Date:   4/8/2025     Expiration Date:   7/8/2025    Lipid Panel     Standing Status:   Future     Expected Date:   4/8/2025     Expiration Date:   7/8/2025    TSH     Standing Status:   Future     Expected Date:   4/8/2025     Expiration Date:   7/8/2025    Urinalysis     Standing Status:   Future     Number of Occurrences:   1     Expected Date:   4/8/2025     Expiration Date:   4/8/2026    PSA, Screening     Standing Status:   Future     Expected Date:   4/8/2025     Expiration Date:   10/8/2025    Vitamin D     Standing Status:   Future     Expected Date:   4/8/2025     Expiration Date:   6/7/2026    TESTOSTERONE, FREE (DIALYSIS) AND TOTAL, LC/MS/MS     Standing Status:   Future     Expected Date:   4/8/2025     Expiration Date:   7/7/2026     Send normal result to authorizing provider's In Basket if patient is active on MyChart::   Yes    Urinalysis, Reflex to Urine Culture     Standing Status:   Future     Expected Date:   4/8/2025     Expiration Date:   6/7/2026     Specimen Source:   Urine     Send normal result to authorizing provider's In Basket if patient is active on MyChart::   Yes        Medication List with Changes/Refills   New Medications    TIRZEPATIDE, WEIGHT  LOSS, (ZEPBOUND) 2.5 MG/0.5 ML PNIJ    Inject 2.5 mg into the skin every 7 days.       Start Date: 4/8/2025  End Date: --    TIRZEPATIDE, WEIGHT LOSS, (ZEPBOUND) 5 MG/0.5 ML PNIJ    Inject 5 mg into the skin every 7 days.       Start Date: 5/6/2025  End Date: --    TIRZEPATIDE, WEIGHT LOSS, (ZEPBOUND) 7.5 MG/0.5 ML PNIJ    Inject 7.5 mg into the skin every 7 days.       Start Date: 6/2/2025  End Date: --   Changed and/or Refilled Medications    Modified Medication Previous Medication    ROSUVASTATIN (CRESTOR) 10 MG TABLET rosuvastatin (CRESTOR) 10 MG tablet       Take 1 tablet (10 mg total) by mouth once daily.    Take 1 tablet (10 mg total) by mouth once daily.       Start Date: 4/8/2025  End Date: --    Start Date: 9/19/2023 End Date: 4/8/2025    VALACYCLOVIR (VALTREX) 500 MG TABLET valACYclovir (VALTREX) 500 MG tablet       Take 1 tablet (500 mg total) by mouth as needed.    Take 500 mg by mouth as needed.       Start Date: 4/8/2025  End Date: --    Start Date: --        End Date: 4/8/2025   Discontinued Medications    FINASTERIDE (PROPECIA) 1 MG TABLET    Take 1 mg by mouth once daily.       Start Date: --        End Date: 4/8/2025    SEMAGLUTIDE, WEIGHT LOSS, (WEGOVY) 0.5 MG/0.5 ML PNIJ    Inject 0.5 mg into the skin every 7 days.       Start Date: 2/14/2025 End Date: 4/8/2025    SEMAGLUTIDE, WEIGHT LOSS, (WEGOVY) 1 MG/0.5 ML PNIJ    INJECT 1 MG INTO THE SKIN EVERY 7 DAYS       Start Date: 4/7/2025  End Date: 4/8/2025            Follow up in about 3 months (around 7/8/2025). In addition to their scheduled follow up, the patient has also been instructed to follow up on as needed basis.       Sony Rose

## 2025-04-09 ENCOUNTER — HOSPITAL ENCOUNTER (OUTPATIENT)
Dept: RADIOLOGY | Facility: HOSPITAL | Age: 45
Discharge: HOME | End: 2025-04-09
Attending: INTERNAL MEDICINE
Payer: COMMERCIAL

## 2025-04-09 DIAGNOSIS — R10.9 RIGHT FLANK PAIN: ICD-10-CM

## 2025-04-09 PROCEDURE — 71046 X-RAY EXAM CHEST 2 VIEWS: CPT | Mod: TC

## 2025-04-10 ENCOUNTER — HOSPITAL ENCOUNTER (OUTPATIENT)
Dept: RADIOLOGY | Facility: HOSPITAL | Age: 45
Discharge: HOME | End: 2025-04-10
Attending: INTERNAL MEDICINE
Payer: COMMERCIAL

## 2025-04-10 PROCEDURE — 72110 X-RAY EXAM L-2 SPINE 4/>VWS: CPT | Mod: TC

## 2025-04-16 ENCOUNTER — PATIENT MESSAGE (OUTPATIENT)
Dept: INTERNAL MEDICINE | Facility: CLINIC | Age: 45
End: 2025-04-16
Payer: COMMERCIAL

## 2025-04-16 ENCOUNTER — RESULTS FOLLOW-UP (OUTPATIENT)
Dept: INTERNAL MEDICINE | Facility: CLINIC | Age: 45
End: 2025-04-16

## 2025-04-16 ENCOUNTER — TELEPHONE (OUTPATIENT)
Dept: INTERNAL MEDICINE | Facility: CLINIC | Age: 45
End: 2025-04-16
Payer: COMMERCIAL

## 2025-04-16 DIAGNOSIS — E55.9 VITAMIN D DEFICIENCY: Primary | ICD-10-CM

## 2025-04-16 RX ORDER — ERGOCALCIFEROL 1.25 MG/1
50000 CAPSULE ORAL
Qty: 24 CAPSULE | Refills: 0 | Status: SHIPPED | OUTPATIENT
Start: 2025-04-17

## 2025-04-16 NOTE — TELEPHONE ENCOUNTER
----- Message from Sony Rose MD sent at 4/15/2025  5:22 PM CDT -----  Tell him x-rays of the spine and chest are normal.  Tell him blood work is normal too,  except for persistently low vitamin-D level.  Recommend that we go with the prescription vitamin-D namely   vitamin-D 08942 IU capsule, 1 p.o. twice weekly. .  Other vitamin-D supplements for now.  Dispense 24.  No refills.  Follow-up with me 3 months with vitamin-D level prior.  This does not need to be   done fasting.    ----- Message -----  From: Interface, Rad Results In  Sent: 4/9/2025   5:40 PM CDT  To: Sony Rose MD

## 2025-05-08 ENCOUNTER — PATIENT MESSAGE (OUTPATIENT)
Dept: RESEARCH | Facility: HOSPITAL | Age: 45
End: 2025-05-08
Payer: COMMERCIAL

## 2025-05-20 ENCOUNTER — LAB VISIT (OUTPATIENT)
Dept: LAB | Facility: HOSPITAL | Age: 45
End: 2025-05-20
Payer: COMMERCIAL

## 2025-05-20 ENCOUNTER — OFFICE VISIT (OUTPATIENT)
Dept: BARIATRICS | Facility: CLINIC | Age: 45
End: 2025-05-20
Payer: COMMERCIAL

## 2025-05-20 VITALS
BODY MASS INDEX: 34.41 KG/M2 | HEART RATE: 68 BPM | OXYGEN SATURATION: 93 % | SYSTOLIC BLOOD PRESSURE: 139 MMHG | WEIGHT: 268.13 LBS | HEIGHT: 74 IN | DIASTOLIC BLOOD PRESSURE: 81 MMHG

## 2025-05-20 DIAGNOSIS — R16.2 HEPATOSPLENOMEGALY: ICD-10-CM

## 2025-05-20 DIAGNOSIS — M54.50 RIGHT LOW BACK PAIN, UNSPECIFIED CHRONICITY, UNSPECIFIED WHETHER SCIATICA PRESENT: Primary | ICD-10-CM

## 2025-05-20 DIAGNOSIS — E66.811 CLASS 1 OBESITY DUE TO EXCESS CALORIES WITH SERIOUS COMORBIDITY AND BODY MASS INDEX (BMI) OF 31.0 TO 31.9 IN ADULT: ICD-10-CM

## 2025-05-20 DIAGNOSIS — E66.09 CLASS 1 OBESITY DUE TO EXCESS CALORIES WITH SERIOUS COMORBIDITY AND BODY MASS INDEX (BMI) OF 31.0 TO 31.9 IN ADULT: ICD-10-CM

## 2025-05-20 LAB
AMYLASE SERPL-CCNC: 68 UNIT/L (ref 20–110)
LIPASE SERPL-CCNC: 45 U/L (ref 4–60)

## 2025-05-20 PROCEDURE — 36415 COLL VENOUS BLD VENIPUNCTURE: CPT

## 2025-05-20 PROCEDURE — 99213 OFFICE O/P EST LOW 20 MIN: CPT | Mod: S$GLB,,, | Performed by: NURSE PRACTITIONER

## 2025-05-20 PROCEDURE — 99999 PR PBB SHADOW E&M-EST. PATIENT-LVL IV: CPT | Mod: PBBFAC,,, | Performed by: NURSE PRACTITIONER

## 2025-05-20 PROCEDURE — 83690 ASSAY OF LIPASE: CPT

## 2025-05-20 PROCEDURE — 82150 ASSAY OF AMYLASE: CPT

## 2025-05-20 RX ORDER — SEMAGLUTIDE 1 MG/.5ML
1 INJECTION, SOLUTION SUBCUTANEOUS
Qty: 2 ML | Refills: 0 | Status: SHIPPED | OUTPATIENT
Start: 2025-05-20

## 2025-05-20 RX ORDER — SEMAGLUTIDE 1.7 MG/.75ML
1.7 INJECTION, SOLUTION SUBCUTANEOUS
Qty: 2.92 ML | Refills: 3 | Status: SHIPPED | OUTPATIENT
Start: 2025-05-20

## 2025-05-20 RX ORDER — SEMAGLUTIDE 1 MG/.5ML
1 INJECTION, SOLUTION SUBCUTANEOUS
COMMUNITY
Start: 2025-04-08 | End: 2025-05-20

## 2025-05-20 NOTE — PROGRESS NOTES
"Subjective     Patient ID: Danilo Zambrano is a 45 y.o. male.    Chief Complaint: Follow-up    CC: weight    Pt here today for follow-up. Has lost 24 lbs (+5.7 lbs muscle. -1.2 lbs fat). Was to start [address] pb meal planner, exercise, was on wegovy 2.4 mg weekly. Denies SE.     2/15/25 Pt has been off Wegovy for 5 weeks now d/t not being able to refill per pt. Pt has gained 7 lbs of muscle since last visit. Pt wants to be referred to Bariatrician in [place] to continue care    5/20/25 Pt was rx zepbound by PCP but insurance did not cover. Pt here today for restart Wegovy. Last dose last week 1 mg. Pt has c/o concerns of pancreas being on GLP no specific abd pain N/V/D, will order labs and U/S. Also c/o lower back pain ortho consult ordered    New BMR: 2121    New PBF: 33.4%      Initial:   BMR:2247    PBF: 34.6%    Lab Results       Component                Value               Date                       ALT                      14                  03/12/2024                 AST                      16                  03/12/2024                 ALKPHOS                  59                  03/12/2024                 BILITOT                  0.9                 03/12/2024                Lab Results       Component                Value               Date                       HGBA1C                   5.6                 06/13/2023                 HGBA1C                   5.5                 11/18/2021                 HGBA1C                   5.2                 06/19/2019            Lab Results       Component                Value               Date                       LDLCALC                  68.8                08/12/2022                 CREATININE               0.9                 03/12/2024                  Review of Systems       Objective   /81 (BP Location: Left arm, Patient Position: Sitting)   Pulse 68   Ht 6' 2" (1.88 m)   Wt 121.6 kg (268 lb 1.6 oz)   SpO2 (!) 93%   BMI 34.42 kg/m²  " "  Physical Exam  Vitals reviewed.   Constitutional:       General: He is not in acute distress.     Appearance: He is well-developed. He is obese.   HENT:      Head: Normocephalic and atraumatic.   Eyes:      General: No scleral icterus.     Pupils: Pupils are equal, round, and reactive to light.   Neck:      Thyroid: No thyromegaly.   Cardiovascular:      Rate and Rhythm: Normal rate and regular rhythm.   Pulmonary:      Effort: Pulmonary effort is normal. No respiratory distress.   Musculoskeletal:         General: Normal range of motion.      Cervical back: Normal range of motion and neck supple.   Skin:     General: Skin is warm and dry.   Neurological:      Mental Status: He is alert and oriented to person, place, and time.   Psychiatric:         Behavior: Behavior normal.         Judgment: Judgment normal.            Assessment and Plan     1. Right low back pain, unspecified chronicity, unspecified whether sciatica present  -     Ambulatory referral/consult to Orthopedics; Future; Expected date: 05/27/2025    2. Class 1 obesity due to excess calories with serious comorbidity and body mass index (BMI) of 31.0 to 31.9 in adult  -     semaglutide, weight loss, (WEGOVY) 1.7 mg/0.75 mL PnIj; Inject 1.7 mg into the skin every 7 days.  Dispense: 2.92 mL; Refill: 3  -     semaglutide, weight loss, (WEGOVY) 1 mg/0.5 mL PnIj; Inject 1 mg into the skin every 7 days.  Dispense: 2 mL; Refill: 0  -     Lipase; Future; Expected date: 05/20/2025  -     Amylase; Future; Expected date: 05/20/2025  -     US Abdomen Complete; Future; Expected date: 05/20/2025    3. Hepatosplenomegaly  -     semaglutide, weight loss, (WEGOVY) 1.7 mg/0.75 mL PnIj; Inject 1.7 mg into the skin every 7 days.  Dispense: 2.92 mL; Refill: 3  -     semaglutide, weight loss, (WEGOVY) 1 mg/0.5 mL PnIj; Inject 1 mg into the skin every 7 days.  Dispense: 2 mL; Refill: 0  -     Lipase; Future; Expected date: 05/20/2025            Danilo Love" was seen " today for follow-up.    Diagnoses and all orders for this visit:    Right low back pain, unspecified chronicity, unspecified whether sciatica present  -     Ambulatory referral/consult to Orthopedics; Future    Class 1 obesity due to excess calories with serious comorbidity and body mass index (BMI) of 31.0 to 31.9 in adult  -     semaglutide, weight loss, (WEGOVY) 1.7 mg/0.75 mL PnIj; Inject 1.7 mg into the skin every 7 days.  -     semaglutide, weight loss, (WEGOVY) 1 mg/0.5 mL PnIj; Inject 1 mg into the skin every 7 days.  -     Lipase; Future  -     Amylase; Future  -     US Abdomen Complete; Future    Hepatosplenomegaly  -     semaglutide, weight loss, (WEGOVY) 1.7 mg/0.75 mL PnIj; Inject 1.7 mg into the skin every 7 days.  -     semaglutide, weight loss, (WEGOVY) 1 mg/0.5 mL PnIj; Inject 1 mg into the skin every 7 days.  -     Lipase; Future          Wegovy 1 mg once a week then increase to 1.7 mg after on month.     Decrease portions as soon as you start wegovy. Avoid fried, rich or greasy foods.     Some nausea in the first 2 weeks is not unusual.     If you get pain across the upper abdomen and around to your back, please call the office.     Www[email or web address] to sign up for coupon card.     Exercise 30 min 3 days a week    Increase low impact activity as tolerated.  Avoid high impact activity, very heavy lifting or other exercise regimens that may cause discomfort.     https://[employer] - /l?k â€¢ splik/coursemeals.com, Www[employer], https://[employer].com for meal options.     [address] p[employer] meal planner, meal  ideas and exercise handouts given previously.   Solutions for constipation:   Miralax 1 capful a day in calorie free liquid. Hold if diarrhea.            No follow-ups on file.

## 2025-05-20 NOTE — PATIENT INSTRUCTIONS
Meal Ideas for Regular Bariatric Diet  *Recipes and products available at [email or web address].com      Breakfast: (15-20g protein)    - Egg white omelet: 2 egg whites or ½ cup Egg Beaters. (Optional proteins: cheese, shrimp, black beans, chicken, sliced turkey) (Optional veggies: tomatoes, salsa, spinach, mushrooms, onions, green peppers, or small slice avocado)     - Egg and sausage: 1 egg or ¼ cup Egg Beaters (any variety), with 1 vane or 2 links of Turkey sausage or Veggie breakfast sausage (Travelmenu or Jetlore)    - Crust-less breakfast quiche: To make a glass pie dish, mix 4oz part skim Ricotta, 1 cup skim milk, and 2 eggs as your base. Add protein: shredded cheese, sliced lean ham or turkey, turkey galloway/sausage. Add veggies: tomato, onion, green onion, mushroom, green pepper, spinach, etc.    - Yogurt parfait: Mix 1 - 6oz container Dannon Light N Fit vanilla yogurt, with ¼ cup crushed unsalted nuts    - Cottage cheese and fruit: ½ cup part-skim cottage cheese or ricotta cheese topped with fresh fruit or sugar free preserves     - Dunia Hodges's Vanilla Egg custard* (add 2 Tbsp instant coffee granules to make Cappuccino Custard*)    - Hi-Protein café latte (skim milk, decaf coffee, 1 scoop protein powder). Optional to add Sugar free syrup or extract flavoring.    - Breakfast Lox: spread fat free cream cheese on slices of smoked salmon. Serve over scrambled or egg over easy (sauteed with nonstick cookspray) OR on a cucumber slice    - Eggwhich: Scramble or cook 1 large egg over easy using nonstick cookspray. Place between 2 slices of [place] galloway and low fat cheese.     Lunch: (20-30g protein)    - ½ cup Black bean soup (Homemade or Progresso), with ¼ cup shredded low-fat cheese. Top with chopped tomato or fresh salsa.     - Lean deli turkey breast and low-fat sliced cheese, mustard or light knight to moisten, rolled up together, or wrapped in a Cody lettuce leaf    - Chicken salad made from dinner  leftovers, moisten with low-fat salad dressing or light knight. Also try leftover salmon, shrimp, tuna or boiled eggs. Serve ½ cup over dark green salad    - Fat-free canned refried beans, topped with ¼ cup shredded low-fat cheese. Top with chopped tomato or fresh salsa.     - Greek salad: Top mixed greens with 1-2oz grilled chicken, tomatoes, red onions, 2-3 kalamata olives, and sprinkle lightly with feta cheese. Spritz with Balsamic vinegar to taste.     - Crust-less lunch quiche: To make a glass pie dish, mix 4oz part skim Ricotta, 1 cup skim milk, and 2 eggs as your base. Add protein: shredded cheese, sliced lean ham or turkey, shrimp, chicken. Add veggies: tomato, onion, green onion, mushroom, green pepper, spinach, artichoke, broccoli, etc.    - Pizza bake: spread a  tina randy mushroom with tomato sauce, low-fat shredded mozzarella and turkey pepperoni or [place] galloway. Add any veggies. Roast for 10-15 minutes, until cheese melted.     - Cucumber crab bites: Spread ¼ cup crab dip (lump crabmeat + light cream cheese and green onions) over sliced cucumber.     - Chicken with light spinach and artichoke dip*: Puree in : 6oz cooked and drained spinach, 2 cloves garlic, 1 can cannelloni beans, ½ cup chopped green onions, 1 can drained artichoke hearts (not marinated in oil), lemon juice and basil. Mix in 2oz chopped up chicken.    Supper: (20-30g protein)    - Serve grilled fish over dark green salad tossed with low-fat dressing, served with grilled asparagus crane     - Rotisserie chicken salad: served with sliced strawberries, walnuts, fat-free feta cheese crumbles and 1 tbsp Swansons Own Light Raspberry Wayne Vinaigrette    - Shrimp cocktail: Dip cold boiled shrimp in homemade low-sugar cocktail sauce (1/2 cup Jese One Carb ketchup, 2 tbsp horseradish, 1/4 tsp hot sauce, 1 tsp Worcestershire sauce, 1 tbsp freshly-squeezed lemon juice). Serve with dark green salad, walnuts, and crumbled blue  cheese drizzled with olive oil and Balsamic vinegar    - Tuna Melt: Spread tuna salad onto 2 thick slices of tomato. Top with low-fat cheese and broil until cheese is melted. May also be made with chicken salad of shrimp salad. [place] with different types of cheeses.    - Chicken or beef fajitas (no tortilla, rice, beans, chips). Top meat and veggies w/ fresh salsa, fat free sour cream.     - Homemade low-fat Chili using extra lean ground beef or ground turkey. Top with shredded cheese and salsa as desired. May add dollop fat-free sour cream if desired    - Chicken parmesan: Top chicken breast w/ low sugar marinara sauce, mozzarella cheese and bake until chicken reaches 165*.  Serve w/ spaghetti SQUASH or Indonesian cut green beans    - Dinner Omelet with shrimp or chicken and onion, green peppers and chives.    - No noodle lasagna: Use sliced zucchini or eggplant in place of noodles.  Layer with part skim ricotta cheese and low sugar meat sauce (use very lean ground beef or ground turkey).    - Mexican chicken bake: Bake chunks of chicken breast or thigh with taco seasoning, Pace brand enchilada sauce, green onions and low-fat cheese. Serve with ¼ cup black beans or fat free refried beans topped with chopped tomatoes or salsa.    - Yoselin frozen meatballs, simmered in Classico Marinara sauce. Different flavors of salsa or spaghetti sauce create different dishes! Sprinkle with parmesan cheese. Serve with grilled or steamed veggies, or a dark green salad.    - Simmer boneless skinless chicken thigh chunks in Classico Marinara sauce or roasted salsa until tender with chopped onion, bell pepper, garlic, mushrooms, spinach, etc.     - Hamburger or veggie burger, without the bun, dressed the way you like. Served with grilled or steamed veggies.    - Eggplant parmesan: Bake slices of eggplant at 350 degrees for 15 minutes. Layer tomato sauce, sliced eggplant and low-fat mozzarella cheese in a baking dish and cover with  foil. Bake 30-40 more minutes or until bubbly. Uncover and bake at 400 degrees for about 15 more minutes, or until top is slightly crisp.    - Fish tacos: grilled/baked white fish, wrapped in [place] lettuce leaf, topped with salsa, shredded low-fat cheese, and light coleslaw.    - Chicken ward: Sprinkle chicken w/ 1 tsp of hidden [place] ranch dip mix. Then grill chicken and top with black beans, salsa and 1 tsp fat free sour cream.     - Cauliflower pizza crust: Use cauliflower as crust (see recipe on pinterest, no flour!). Top w/ low fat cheese, turkey pepperoni and veggies and bake again    - chicken or turkey crust pizza: use ground chicken or turkey instead of cauliflower, spread in [place] and bake at 350 for about 20-30 minutes(may want to add garlic, black pepper, oregano and other herbs to ground meat mixture).  Remove and top w/ low fat cheese, turkey pepperoni and veggies and bake again for another 10 minutes or until cheese is browned.     Snacks: (100-200 calories; >5g protein)    - 1 low-fat cheese stick with 8 cherry tomatoes or 1 serving fresh fruit  - 4 thin slices fat-free turkey breast and 1 slice low-fat cheese  - 4 thin slices fat-free honey ham with wedge of melon  - 6-8 edamame pods (equivalent to about 1/4 cup edamame without pods).   - 1/4 cup unsalted nuts with ½ cup fruit  - 6-oz container Dannon Light n Fit vanilla yogurt, topped with 1oz unsalted nuts         - apple, celery or baby carrots spread with 2 Tbsp PB2  - apple slices with 1 oz slice low-fat cheese  - Apple slices dipped in 2 Tbsp of PB2  - celery, cucumber, bell pepper or baby carrots dipped in ¼ cup hummus bean spread or light spinach and artichoke dip (*recipe in lunch section)  - celery, cucumber, baby carrots dipped in high protein greek yogurt (Mix 16 oz plain greek yogurt + 1 packet of hidden [place] ranch dip mix)  - Marco Links Beef Steak - 14g protein! (similar to beef jerky)  - 2 wedges Laughing Cow - Light Herb  & Garlic Cheese with sliced cucumber or green bell pepper  - 1/2 cup low-fat cottage cheese with ¼ cup fruit or ¼ cup salsa  - RTD Protein drinks: Atkins, Low Carb Slim Fast, EAS light, Muscle Milk Light, etc.  - Homemade Protein drinks: GNC Soy95, Isopure, Nectar, UNJURY, Whey Gourmet, etc. Mix 1 scoop powder with 8oz skim/1% milk or light soymilk.  - Protein bars: Atkins, EAS, Pure Protein, Think Thin, Detour, etc. Must have 0-4 grams sugar - Read the label.    Takeout Options: No more than twice/week  Deli - Salads (no pasta or rice), meats, cheeses. Roasted chicken. Lox (salmon)    Mexican - Platters which don't include tortillas, chips, or rice. Go easy on the beans. Example: Fajitas without the tortillas. Ask the  not to bring chips to the table if they are too tempting.    Greek - Meat or fish and vegetable, but no bread or rice. Including hummus, baba ganoush, etc, is OK. Most sit-down Greek restaurants can provide you with cucumber slices for dipping instead of katja bread.    Fast Food (Avoid as much as possible) - Salads (no croutons and limit salad dressing to 2 tbsp), grilled chicken sandwich without the bun and ask for no knight. Genevas low fat chili or Taco Bell pintos and cheese.    BBQ - The meats are fine if you ask for sauces on the side, but most of the traditional side dishes are loaded with carbs. Tristan slaw, baked beans and BBQ sauce are typically made with sugar.    Chinese - Nothing deep-fried, no rice or noodles. Many Chinese sauces have starch and sugar in them, so you'll have to use your judgement. If you find that these sauces trigger cravings, or cause Dumping, you can ask for the sauce to be made without sugar or just use soy sauce.

## 2025-05-21 ENCOUNTER — RESULTS FOLLOW-UP (OUTPATIENT)
Dept: BARIATRICS | Facility: CLINIC | Age: 45
End: 2025-05-21

## 2025-07-14 DIAGNOSIS — E66.09 CLASS 1 OBESITY DUE TO EXCESS CALORIES WITH SERIOUS COMORBIDITY AND BODY MASS INDEX (BMI) OF 31.0 TO 31.9 IN ADULT: ICD-10-CM

## 2025-07-14 DIAGNOSIS — Z86.19 HISTORY OF HERPES GENITALIS: Primary | ICD-10-CM

## 2025-07-14 DIAGNOSIS — R16.2 HEPATOSPLENOMEGALY: ICD-10-CM

## 2025-07-14 DIAGNOSIS — E66.811 CLASS 1 OBESITY DUE TO EXCESS CALORIES WITH SERIOUS COMORBIDITY AND BODY MASS INDEX (BMI) OF 31.0 TO 31.9 IN ADULT: ICD-10-CM

## 2025-07-14 RX ORDER — VALACYCLOVIR HYDROCHLORIDE 500 MG/1
500 TABLET, FILM COATED ORAL
Qty: 30 TABLET | Refills: 5 | Status: SHIPPED | OUTPATIENT
Start: 2025-07-14

## 2025-07-14 RX ORDER — SEMAGLUTIDE 1.7 MG/.75ML
1.7 INJECTION, SOLUTION SUBCUTANEOUS
Qty: 2.92 ML | Refills: 3 | Status: SHIPPED | OUTPATIENT
Start: 2025-07-14

## 2025-07-15 ENCOUNTER — TELEPHONE (OUTPATIENT)
Dept: INTERNAL MEDICINE | Facility: CLINIC | Age: 45
End: 2025-07-15
Payer: COMMERCIAL

## 2025-07-15 NOTE — TELEPHONE ENCOUNTER
----- Message from Nurse Valentine sent at 7/14/2025  2:12 PM CDT -----  Regarding: cristy Werner 07/21 @10:00  Labs completed.

## 2025-07-21 ENCOUNTER — OFFICE VISIT (OUTPATIENT)
Dept: INTERNAL MEDICINE | Facility: CLINIC | Age: 45
End: 2025-07-21
Payer: COMMERCIAL

## 2025-07-21 VITALS
OXYGEN SATURATION: 97 % | WEIGHT: 273.63 LBS | BODY MASS INDEX: 35.12 KG/M2 | HEART RATE: 70 BPM | RESPIRATION RATE: 18 BRPM | SYSTOLIC BLOOD PRESSURE: 131 MMHG | HEIGHT: 74 IN | DIASTOLIC BLOOD PRESSURE: 83 MMHG

## 2025-07-21 DIAGNOSIS — R79.89 ELEVATED FERRITIN: ICD-10-CM

## 2025-07-21 DIAGNOSIS — E78.5 HYPERLIPIDEMIA, UNSPECIFIED HYPERLIPIDEMIA TYPE: Primary | ICD-10-CM

## 2025-07-21 DIAGNOSIS — Z14.8 CARRIER OF HEMOCHROMATOSIS HFE GENE MUTATION: ICD-10-CM

## 2025-07-21 DIAGNOSIS — K76.0 METABOLIC DYSFUNCTION-ASSOCIATED STEATOTIC LIVER DISEASE (MASLD): ICD-10-CM

## 2025-07-21 DIAGNOSIS — R79.89 LOW TESTOSTERONE IN MALE: ICD-10-CM

## 2025-07-21 PROCEDURE — 99213 OFFICE O/P EST LOW 20 MIN: CPT | Mod: ,,, | Performed by: INTERNAL MEDICINE

## 2025-07-21 RX ORDER — SEMAGLUTIDE 2.4 MG/.75ML
2.4 INJECTION, SOLUTION SUBCUTANEOUS
Qty: 3 ML | Refills: 11 | Status: SHIPPED | OUTPATIENT
Start: 2025-07-21 | End: 2026-07-21

## 2025-07-21 NOTE — PROGRESS NOTES
"Patient ID: [id number]      Subjective:     Chief Complaint: Follow-up      Danilo Zambrano is a 45 y.o. male.  The patient is a 45-year-old man in for follow-up of obesity.  That has been problems getting his Wegovy.  We had actually tried to switch him to tirzepatide but for some reason it was not covered.  That has Wegovy was covered but there were delays getting it refilled.  It seemed like it was helping then he has not had for about 2 weeks.    Follow-up        Review of Systems    Outpatient Medications Marked as Taking for the 7/21/25 encounter (Office Visit) with Sony Rose MD   Medication Sig Dispense Refill    ergocalciferol (VITAMIN D2) 50,000 unit Cap Take 1 capsule (50,000 Units total) by mouth twice a week. 24 capsule 0    rosuvastatin (CRESTOR) 10 MG tablet Take 1 tablet (10 mg total) by mouth once daily. 90 tablet 3    valACYclovir (VALTREX) 500 MG tablet Take 1 tablet (500 mg total) by mouth as needed. 30 tablet 5    [DISCONTINUED] semaglutide, weight loss, (WEGOVY) 1.7 mg/0.75 mL PnIj Inject 1.7 mg into the skin every 7 days. 2.92 mL 3       Objective:     /83 (BP Location: Left arm, Patient Position: Sitting)   Pulse 70   Resp 18   Ht 6' 2" (1.88 m)   Wt 124.1 kg (273 lb 9.6 oz)   SpO2 97%   BMI 35.13 kg/m²     Physical Exam  Vitals reviewed.   Constitutional:       Appearance: Normal appearance.   HENT:      Head: Normocephalic and atraumatic.      Mouth/Throat:      Pharynx: Oropharynx is clear.   Eyes:      Pupils: Pupils are equal, round, and reactive to light.   Cardiovascular:      Rate and Rhythm: Normal rate and regular rhythm.      Pulses: Normal pulses.      Heart sounds: Normal heart sounds.   Pulmonary:      Breath sounds: Normal breath sounds.   Abdominal:      General: Abdomen is flat.      Palpations: Abdomen is soft.   Musculoskeletal:      Cervical back: Neck supple.   Skin:     General: Skin is warm and dry.   Neurological:      Mental Status: He is " alert.         Assessment:     1. Morbid obesity.  Weight actually went up, but he has had trouble getting his meds.    2. Hyperlipidemia.  Numbers good at last check 3 months ago     3. History of low testosterone     4. History of fatty liver     5. History of hemochromatosis carrier status.      6. History of low vitamin-D level.  On supplements now    Plan:   New prescription for semaglutide 2.4 mg subQ weekly sent to local pharmacy.  Refills included.  Follow-up 3 months without lab work  Problem List Items Addressed This Visit          Oncology    Elevated ferritin       Endocrine    Low testosterone in male       GI    Metabolic dysfunction-associated steatotic liver disease (MASLD)       Genetic    Carrier of hemochromatosis HFE gene mutation     Other Visit Diagnoses         Hyperlipidemia, unspecified hyperlipidemia type    -  Primary             No orders of the defined types were placed in this encounter.       Medication List with Changes/Refills   New Medications    SEMAGLUTIDE, WEIGHT LOSS, (WEGOVY) 2.4 MG/0.75 ML PNIJ    Inject 2.4 mg into the skin every 7 days.       Start Date: 7/21/2025 End Date: 7/21/2026   Current Medications    ERGOCALCIFEROL (VITAMIN D2) 50,000 UNIT CAP    Take 1 capsule (50,000 Units total) by mouth twice a week.       Start Date: 4/17/2025 End Date: --    ROSUVASTATIN (CRESTOR) 10 MG TABLET    Take 1 tablet (10 mg total) by mouth once daily.       Start Date: 4/8/2025  End Date: --    VALACYCLOVIR (VALTREX) 500 MG TABLET    Take 1 tablet (500 mg total) by mouth as needed.       Start Date: 7/14/2025 End Date: --   Discontinued Medications    SEMAGLUTIDE, WEIGHT LOSS, (WEGOVY) 1 MG/0.5 ML PNIJ    Inject 1 mg into the skin every 7 days.       Start Date: 5/20/2025 End Date: 7/21/2025    SEMAGLUTIDE, WEIGHT LOSS, (WEGOVY) 1.7 MG/0.75 ML PNIJ    Inject 1.7 mg into the skin every 7 days.       Start Date: 7/14/2025 End Date: 7/21/2025            Follow up in about 3 months  (around 10/21/2025). In addition to their scheduled follow up, the patient has also been instructed to follow up on as needed basis.       Sony Rose

## 2025-07-22 ENCOUNTER — TELEPHONE (OUTPATIENT)
Dept: INTERNAL MEDICINE | Facility: CLINIC | Age: 45
End: 2025-07-22
Payer: COMMERCIAL

## 2025-07-22 NOTE — TELEPHONE ENCOUNTER
Copied from CRM #[id number]. Topic: General Inquiry - Patient Advice  >> Jul 22, 2025  3:46 PM Erin wrote:  .Who Called: Danilo Zambrano    Caller is requesting assistance/information from provider's office.    Symptoms (please be specific): na   How long has patient had these symptoms:  na  List of preferred pharmacies on file (remove unneeded): [unfilled]  If different, enter pharmacy into here including location and phone number: na      Preferred Method of Contact: Phone Call  Patient's Preferred Phone Number on File: [phone]   Best Call Back Number, if different:  Additional Information: pt wants nurse to give him a call regarding PA for medication

## 2025-07-23 ENCOUNTER — TELEPHONE (OUTPATIENT)
Dept: INTERNAL MEDICINE | Facility: CLINIC | Age: 45
End: 2025-07-23
Payer: COMMERCIAL

## 2025-08-29 ENCOUNTER — LAB VISIT (OUTPATIENT)
Dept: LAB | Facility: HOSPITAL | Age: 45
End: 2025-08-29
Attending: INTERNAL MEDICINE
Payer: COMMERCIAL

## 2025-08-29 ENCOUNTER — OFFICE VISIT (OUTPATIENT)
Dept: HEMATOLOGY/ONCOLOGY | Facility: CLINIC | Age: 45
End: 2025-08-29
Payer: COMMERCIAL

## 2025-08-29 VITALS
WEIGHT: 262.88 LBS | HEART RATE: 75 BPM | BODY MASS INDEX: 33.74 KG/M2 | RESPIRATION RATE: 18 BRPM | HEIGHT: 74 IN | DIASTOLIC BLOOD PRESSURE: 86 MMHG | SYSTOLIC BLOOD PRESSURE: 129 MMHG | OXYGEN SATURATION: 95 % | TEMPERATURE: 98 F

## 2025-08-29 DIAGNOSIS — Z14.8 CARRIER OF HEMOCHROMATOSIS HFE GENE MUTATION: ICD-10-CM

## 2025-08-29 DIAGNOSIS — R79.89 ELEVATED FERRITIN: Primary | ICD-10-CM

## 2025-08-29 DIAGNOSIS — R79.89 ELEVATED FERRITIN: ICD-10-CM

## 2025-08-29 LAB
AFP-TM SERPL-MCNC: <2 NG/ML
ALBUMIN SERPL-MCNC: 4.3 G/DL (ref 3.5–5)
ALBUMIN/GLOB SERPL: 1.2 RATIO (ref 1.1–2)
ALP SERPL-CCNC: 80 UNIT/L (ref 40–150)
ALT SERPL-CCNC: 34 UNIT/L (ref 0–55)
ANION GAP SERPL CALC-SCNC: 10 MEQ/L
AST SERPL-CCNC: 28 UNIT/L (ref 11–45)
BASOPHILS # BLD AUTO: 0.03 X10(3)/MCL
BASOPHILS NFR BLD AUTO: 0.4 %
BILIRUB SERPL-MCNC: 1.4 MG/DL
BUN SERPL-MCNC: 20 MG/DL (ref 8.9–20.6)
CALCIUM SERPL-MCNC: 9.5 MG/DL (ref 8.4–10.2)
CHLORIDE SERPL-SCNC: 110 MMOL/L (ref 98–107)
CO2 SERPL-SCNC: 25 MMOL/L (ref 22–29)
CREAT SERPL-MCNC: 1.04 MG/DL (ref 0.72–1.25)
CREAT/UREA NIT SERPL: 19
EOSINOPHIL # BLD AUTO: 0.13 X10(3)/MCL (ref 0–0.9)
EOSINOPHIL NFR BLD AUTO: 1.8 %
ERYTHROCYTE [DISTWIDTH] IN BLOOD BY AUTOMATED COUNT: 12.2 % (ref 11.5–17)
FERRITIN SERPL-MCNC: 354.7 NG/ML (ref 21.81–274.66)
GFR SERPLBLD CREATININE-BSD FMLA CKD-EPI: >60 ML/MIN/1.73/M2
GLOBULIN SER-MCNC: 3.7 GM/DL (ref 2.4–3.5)
GLUCOSE SERPL-MCNC: 93 MG/DL (ref 74–100)
HCT VFR BLD AUTO: 44.5 % (ref 42–52)
HGB BLD-MCNC: 15.3 G/DL (ref 14–18)
IMM GRANULOCYTES # BLD AUTO: 0.02 X10(3)/MCL (ref 0–0.04)
IMM GRANULOCYTES NFR BLD AUTO: 0.3 %
IRON SATN MFR SERPL: 28 % (ref 20–50)
IRON SERPL-MCNC: 85 UG/DL (ref 65–175)
LYMPHOCYTES # BLD AUTO: 2.48 X10(3)/MCL (ref 0.6–4.6)
LYMPHOCYTES NFR BLD AUTO: 33.9 %
MCH RBC QN AUTO: 29.8 PG (ref 27–31)
MCHC RBC AUTO-ENTMCNC: 34.4 G/DL (ref 33–36)
MCV RBC AUTO: 86.6 FL (ref 80–94)
MONOCYTES # BLD AUTO: 0.48 X10(3)/MCL (ref 0.1–1.3)
MONOCYTES NFR BLD AUTO: 6.6 %
NEUTROPHILS # BLD AUTO: 4.18 X10(3)/MCL (ref 2.1–9.2)
NEUTROPHILS NFR BLD AUTO: 57 %
PLATELET # BLD AUTO: 194 X10(3)/MCL (ref 130–400)
PMV BLD AUTO: 9.5 FL (ref 7.4–10.4)
POTASSIUM SERPL-SCNC: 5.1 MMOL/L (ref 3.5–5.1)
PROT SERPL-MCNC: 8 GM/DL (ref 6.4–8.3)
RBC # BLD AUTO: 5.14 X10(6)/MCL (ref 4.7–6.1)
SODIUM SERPL-SCNC: 145 MMOL/L (ref 136–145)
TIBC SERPL-MCNC: 214 UG/DL (ref 60–240)
TIBC SERPL-MCNC: 299 UG/DL (ref 250–450)
TRANSFERRIN SERPL-MCNC: 259 MG/DL (ref 174–364)
WBC # BLD AUTO: 7.32 X10(3)/MCL (ref 4.5–11.5)

## 2025-08-29 PROCEDURE — 83540 ASSAY OF IRON: CPT

## 2025-08-29 PROCEDURE — 82105 ALPHA-FETOPROTEIN SERUM: CPT

## 2025-08-29 PROCEDURE — 82728 ASSAY OF FERRITIN: CPT

## 2025-08-29 PROCEDURE — 85025 COMPLETE CBC W/AUTO DIFF WBC: CPT

## 2025-08-29 PROCEDURE — 80053 COMPREHEN METABOLIC PANEL: CPT

## 2025-08-29 PROCEDURE — 99999 PR PBB SHADOW E&M-EST. PATIENT-LVL IV: CPT | Mod: PBBFAC,,, | Performed by: INTERNAL MEDICINE

## 2025-09-03 DIAGNOSIS — Z14.8 CARRIER OF HEMOCHROMATOSIS HFE GENE MUTATION: ICD-10-CM

## 2025-09-03 DIAGNOSIS — R79.89 ELEVATED FERRITIN: Primary | ICD-10-CM
